# Patient Record
Sex: MALE | Race: WHITE | Employment: FULL TIME | URBAN - METROPOLITAN AREA
[De-identification: names, ages, dates, MRNs, and addresses within clinical notes are randomized per-mention and may not be internally consistent; named-entity substitution may affect disease eponyms.]

---

## 2023-10-24 ENCOUNTER — APPOINTMENT (OUTPATIENT)
Dept: RADIOLOGY | Facility: HOSPITAL | Age: 33
DRG: 309 | End: 2023-10-24
Payer: COMMERCIAL

## 2023-10-24 ENCOUNTER — APPOINTMENT (OUTPATIENT)
Dept: CARDIOLOGY | Facility: HOSPITAL | Age: 33
DRG: 309 | End: 2023-10-24
Payer: COMMERCIAL

## 2023-10-24 ENCOUNTER — HOSPITAL ENCOUNTER (INPATIENT)
Facility: HOSPITAL | Age: 33
LOS: 2 days | Discharge: HOME | DRG: 309 | End: 2023-10-26
Attending: STUDENT IN AN ORGANIZED HEALTH CARE EDUCATION/TRAINING PROGRAM | Admitting: STUDENT IN AN ORGANIZED HEALTH CARE EDUCATION/TRAINING PROGRAM
Payer: COMMERCIAL

## 2023-10-24 DIAGNOSIS — I31.39 PERICARDIAL EFFUSION (HHS-HCC): ICD-10-CM

## 2023-10-24 DIAGNOSIS — I24.89 DEMAND ISCHEMIA (MULTI): ICD-10-CM

## 2023-10-24 DIAGNOSIS — I48.91 ATRIAL FIBRILLATION WITH RAPID VENTRICULAR RESPONSE (MULTI): Primary | ICD-10-CM

## 2023-10-24 DIAGNOSIS — I48.91 ATRIAL FIBRILLATION WITH RVR (MULTI): ICD-10-CM

## 2023-10-24 LAB
ALBUMIN SERPL BCP-MCNC: 4.3 G/DL (ref 3.4–5)
ALP SERPL-CCNC: 60 U/L (ref 33–120)
ALT SERPL W P-5'-P-CCNC: 18 U/L (ref 10–52)
ANION GAP BLDV CALCULATED.4IONS-SCNC: 1 MMOL/L (ref 10–25)
ANION GAP SERPL CALC-SCNC: 12 MMOL/L (ref 10–20)
AST SERPL W P-5'-P-CCNC: 16 U/L (ref 9–39)
BASE EXCESS BLDV CALC-SCNC: 8.3 MMOL/L (ref -2–3)
BASOPHILS # BLD AUTO: 0.05 X10*3/UL (ref 0–0.1)
BASOPHILS NFR BLD AUTO: 0.7 %
BILIRUB SERPL-MCNC: 0.6 MG/DL (ref 0–1.2)
BODY TEMPERATURE: 37 DEGREES CELSIUS
BUN SERPL-MCNC: 10 MG/DL (ref 6–23)
CA-I BLDV-SCNC: 1.14 MMOL/L (ref 1.1–1.33)
CALCIUM SERPL-MCNC: 9.5 MG/DL (ref 8.6–10.3)
CARDIAC TROPONIN I PNL SERPL HS: 46 NG/L (ref 0–20)
CARDIAC TROPONIN I PNL SERPL HS: 55 NG/L (ref 0–20)
CARDIAC TROPONIN I PNL SERPL HS: 59 NG/L (ref 0–20)
CARDIAC TROPONIN I PNL SERPL HS: 84 NG/L (ref 0–20)
CHLORIDE BLDV-SCNC: 102 MMOL/L (ref 98–107)
CHLORIDE SERPL-SCNC: 99 MMOL/L (ref 98–107)
CHOLEST SERPL-MCNC: 121 MG/DL (ref 0–199)
CHOLESTEROL/HDL RATIO: 5.9
CO2 SERPL-SCNC: 28 MMOL/L (ref 21–32)
CREAT SERPL-MCNC: 0.87 MG/DL (ref 0.5–1.3)
EOSINOPHIL # BLD AUTO: 0.25 X10*3/UL (ref 0–0.7)
EOSINOPHIL NFR BLD AUTO: 3.3 %
ERYTHROCYTE [DISTWIDTH] IN BLOOD BY AUTOMATED COUNT: 12.1 % (ref 11.5–14.5)
FLUAV RNA RESP QL NAA+PROBE: NOT DETECTED
FLUBV RNA RESP QL NAA+PROBE: NOT DETECTED
GFR SERPL CREATININE-BSD FRML MDRD: >90 ML/MIN/1.73M*2
GLUCOSE BLDV-MCNC: 100 MG/DL (ref 74–99)
GLUCOSE SERPL-MCNC: 96 MG/DL (ref 74–99)
HCO3 BLDV-SCNC: 34 MMOL/L (ref 22–26)
HCT VFR BLD AUTO: 42.7 % (ref 41–52)
HCT VFR BLD EST: 41 % (ref 41–52)
HDLC SERPL-MCNC: 20.4 MG/DL
HGB BLD-MCNC: 14.3 G/DL (ref 13.5–17.5)
HGB BLDV-MCNC: 13.6 G/DL (ref 13.5–17.5)
IMM GRANULOCYTES # BLD AUTO: 0.03 X10*3/UL (ref 0–0.7)
IMM GRANULOCYTES NFR BLD AUTO: 0.4 % (ref 0–0.9)
INHALED O2 CONCENTRATION: 21 %
LACTATE BLDV-SCNC: 1.1 MMOL/L (ref 0.4–2)
LACTATE SERPL-SCNC: 0.9 MMOL/L (ref 0.4–2)
LDLC SERPL CALC-MCNC: 86 MG/DL
LYMPHOCYTES # BLD AUTO: 1.59 X10*3/UL (ref 1.2–4.8)
LYMPHOCYTES NFR BLD AUTO: 20.8 %
MAGNESIUM SERPL-MCNC: 2.07 MG/DL (ref 1.6–2.4)
MCH RBC QN AUTO: 28.9 PG (ref 26–34)
MCHC RBC AUTO-ENTMCNC: 33.5 G/DL (ref 32–36)
MCV RBC AUTO: 86 FL (ref 80–100)
MONOCYTES # BLD AUTO: 0.71 X10*3/UL (ref 0.1–1)
MONOCYTES NFR BLD AUTO: 9.3 %
NEUTROPHILS # BLD AUTO: 5.02 X10*3/UL (ref 1.2–7.7)
NEUTROPHILS NFR BLD AUTO: 65.5 %
NON HDL CHOLESTEROL: 101 MG/DL (ref 0–149)
NRBC BLD-RTO: 0 /100 WBCS (ref 0–0)
OXYHGB MFR BLDV: 31.9 % (ref 45–75)
PCO2 BLDV: 50 MM HG (ref 41–51)
PH BLDV: 7.44 PH (ref 7.33–7.43)
PHOSPHATE SERPL-MCNC: 3.4 MG/DL (ref 2.5–4.9)
PLATELET # BLD AUTO: 333 X10*3/UL (ref 150–450)
PMV BLD AUTO: 8.9 FL (ref 7.5–11.5)
PO2 BLDV: 28 MM HG (ref 35–45)
POTASSIUM BLDV-SCNC: 4.4 MMOL/L (ref 3.5–5.3)
POTASSIUM SERPL-SCNC: 4.2 MMOL/L (ref 3.5–5.3)
PROT SERPL-MCNC: 7.6 G/DL (ref 6.4–8.2)
RBC # BLD AUTO: 4.94 X10*6/UL (ref 4.5–5.9)
SAO2 % BLDV: 32 % (ref 45–75)
SARS-COV-2 RNA RESP QL NAA+PROBE: NOT DETECTED
SODIUM BLDV-SCNC: 133 MMOL/L (ref 136–145)
SODIUM SERPL-SCNC: 135 MMOL/L (ref 136–145)
TRIGL SERPL-MCNC: 74 MG/DL (ref 0–149)
TSH SERPL-ACNC: 1.51 MIU/L (ref 0.44–3.98)
VLDL: 15 MG/DL (ref 0–40)
WBC # BLD AUTO: 7.7 X10*3/UL (ref 4.4–11.3)

## 2023-10-24 PROCEDURE — 2500000001 HC RX 250 WO HCPCS SELF ADMINISTERED DRUGS (ALT 637 FOR MEDICARE OP): Performed by: STUDENT IN AN ORGANIZED HEALTH CARE EDUCATION/TRAINING PROGRAM

## 2023-10-24 PROCEDURE — 96361 HYDRATE IV INFUSION ADD-ON: CPT

## 2023-10-24 PROCEDURE — 85025 COMPLETE CBC W/AUTO DIFF WBC: CPT | Performed by: STUDENT IN AN ORGANIZED HEALTH CARE EDUCATION/TRAINING PROGRAM

## 2023-10-24 PROCEDURE — 2060000001 HC INTERMEDIATE ICU ROOM DAILY

## 2023-10-24 PROCEDURE — 2500000001 HC RX 250 WO HCPCS SELF ADMINISTERED DRUGS (ALT 637 FOR MEDICARE OP): Performed by: INTERNAL MEDICINE

## 2023-10-24 PROCEDURE — 71045 X-RAY EXAM CHEST 1 VIEW: CPT | Performed by: RADIOLOGY

## 2023-10-24 PROCEDURE — 83735 ASSAY OF MAGNESIUM: CPT | Performed by: STUDENT IN AN ORGANIZED HEALTH CARE EDUCATION/TRAINING PROGRAM

## 2023-10-24 PROCEDURE — 85018 HEMOGLOBIN: CPT | Performed by: STUDENT IN AN ORGANIZED HEALTH CARE EDUCATION/TRAINING PROGRAM

## 2023-10-24 PROCEDURE — 84443 ASSAY THYROID STIM HORMONE: CPT | Performed by: INTERNAL MEDICINE

## 2023-10-24 PROCEDURE — 84132 ASSAY OF SERUM POTASSIUM: CPT | Performed by: STUDENT IN AN ORGANIZED HEALTH CARE EDUCATION/TRAINING PROGRAM

## 2023-10-24 PROCEDURE — 36415 COLL VENOUS BLD VENIPUNCTURE: CPT | Performed by: STUDENT IN AN ORGANIZED HEALTH CARE EDUCATION/TRAINING PROGRAM

## 2023-10-24 PROCEDURE — 87636 SARSCOV2 & INF A&B AMP PRB: CPT | Performed by: STUDENT IN AN ORGANIZED HEALTH CARE EDUCATION/TRAINING PROGRAM

## 2023-10-24 PROCEDURE — 2500000005 HC RX 250 GENERAL PHARMACY W/O HCPCS: Performed by: STUDENT IN AN ORGANIZED HEALTH CARE EDUCATION/TRAINING PROGRAM

## 2023-10-24 PROCEDURE — 96374 THER/PROPH/DIAG INJ IV PUSH: CPT

## 2023-10-24 PROCEDURE — 99223 1ST HOSP IP/OBS HIGH 75: CPT | Performed by: INTERNAL MEDICINE

## 2023-10-24 PROCEDURE — 2500000004 HC RX 250 GENERAL PHARMACY W/ HCPCS (ALT 636 FOR OP/ED): Performed by: STUDENT IN AN ORGANIZED HEALTH CARE EDUCATION/TRAINING PROGRAM

## 2023-10-24 PROCEDURE — 84100 ASSAY OF PHOSPHORUS: CPT | Performed by: STUDENT IN AN ORGANIZED HEALTH CARE EDUCATION/TRAINING PROGRAM

## 2023-10-24 PROCEDURE — 71045 X-RAY EXAM CHEST 1 VIEW: CPT | Mod: FY

## 2023-10-24 PROCEDURE — 82435 ASSAY OF BLOOD CHLORIDE: CPT | Performed by: STUDENT IN AN ORGANIZED HEALTH CARE EDUCATION/TRAINING PROGRAM

## 2023-10-24 PROCEDURE — 84484 ASSAY OF TROPONIN QUANT: CPT | Performed by: STUDENT IN AN ORGANIZED HEALTH CARE EDUCATION/TRAINING PROGRAM

## 2023-10-24 PROCEDURE — 93005 ELECTROCARDIOGRAM TRACING: CPT

## 2023-10-24 PROCEDURE — 83605 ASSAY OF LACTIC ACID: CPT | Performed by: STUDENT IN AN ORGANIZED HEALTH CARE EDUCATION/TRAINING PROGRAM

## 2023-10-24 PROCEDURE — 80061 LIPID PANEL: CPT | Performed by: INTERNAL MEDICINE

## 2023-10-24 PROCEDURE — 84484 ASSAY OF TROPONIN QUANT: CPT | Performed by: INTERNAL MEDICINE

## 2023-10-24 PROCEDURE — 99291 CRITICAL CARE FIRST HOUR: CPT | Performed by: STUDENT IN AN ORGANIZED HEALTH CARE EDUCATION/TRAINING PROGRAM

## 2023-10-24 RX ORDER — ASPIRIN 81 MG/1
81 TABLET ORAL DAILY
Status: DISCONTINUED | OUTPATIENT
Start: 2023-10-25 | End: 2023-10-26

## 2023-10-24 RX ORDER — ONDANSETRON HYDROCHLORIDE 2 MG/ML
4 INJECTION, SOLUTION INTRAVENOUS EVERY 8 HOURS PRN
Status: DISCONTINUED | OUTPATIENT
Start: 2023-10-24 | End: 2023-10-26 | Stop reason: HOSPADM

## 2023-10-24 RX ORDER — ATORVASTATIN CALCIUM 40 MG/1
40 TABLET, FILM COATED ORAL NIGHTLY
Status: DISCONTINUED | OUTPATIENT
Start: 2023-10-24 | End: 2023-10-25

## 2023-10-24 RX ORDER — AMOXICILLIN 250 MG
2 CAPSULE ORAL 2 TIMES DAILY PRN
Status: DISCONTINUED | OUTPATIENT
Start: 2023-10-24 | End: 2023-10-26 | Stop reason: HOSPADM

## 2023-10-24 RX ORDER — NAPROXEN SODIUM 220 MG/1
324 TABLET, FILM COATED ORAL ONCE
Status: COMPLETED | OUTPATIENT
Start: 2023-10-24 | End: 2023-10-24

## 2023-10-24 RX ORDER — ACETAMINOPHEN 325 MG/1
650 TABLET ORAL EVERY 6 HOURS PRN
Status: DISCONTINUED | OUTPATIENT
Start: 2023-10-24 | End: 2023-10-26 | Stop reason: HOSPADM

## 2023-10-24 RX ORDER — DILTIAZEM HYDROCHLORIDE 5 MG/ML
20 INJECTION INTRAVENOUS ONCE
Status: COMPLETED | OUTPATIENT
Start: 2023-10-24 | End: 2023-10-24

## 2023-10-24 RX ORDER — TALC
3 POWDER (GRAM) TOPICAL NIGHTLY PRN
Status: DISCONTINUED | OUTPATIENT
Start: 2023-10-24 | End: 2023-10-26 | Stop reason: HOSPADM

## 2023-10-24 RX ADMIN — SODIUM CHLORIDE, POTASSIUM CHLORIDE, SODIUM LACTATE AND CALCIUM CHLORIDE 1000 ML: 600; 310; 30; 20 INJECTION, SOLUTION INTRAVENOUS at 09:46

## 2023-10-24 RX ADMIN — Medication 3 MG: at 21:16

## 2023-10-24 RX ADMIN — DILTIAZEM HYDROCHLORIDE 20 MG: 5 INJECTION, SOLUTION INTRAVENOUS at 12:12

## 2023-10-24 RX ADMIN — DILTIAZEM HYDROCHLORIDE 5 MG/HR: 5 INJECTION, SOLUTION INTRAVENOUS at 12:12

## 2023-10-24 RX ADMIN — ASPIRIN 81 MG CHEWABLE TABLET 324 MG: 81 TABLET CHEWABLE at 12:11

## 2023-10-24 RX ADMIN — ATORVASTATIN CALCIUM 40 MG: 40 TABLET, FILM COATED ORAL at 21:16

## 2023-10-24 SDOH — SOCIAL STABILITY: SOCIAL INSECURITY: ARE THERE ANY APPARENT SIGNS OF INJURIES/BEHAVIORS THAT COULD BE RELATED TO ABUSE/NEGLECT?: NO

## 2023-10-24 SDOH — SOCIAL STABILITY: SOCIAL INSECURITY: HAVE YOU HAD THOUGHTS OF HARMING ANYONE ELSE?: YES

## 2023-10-24 SDOH — SOCIAL STABILITY: SOCIAL INSECURITY: ARE YOU OR HAVE YOU BEEN THREATENED OR ABUSED PHYSICALLY, EMOTIONALLY, OR SEXUALLY BY ANYONE?: NO

## 2023-10-24 SDOH — SOCIAL STABILITY: SOCIAL INSECURITY: DOES ANYONE TRY TO KEEP YOU FROM HAVING/CONTACTING OTHER FRIENDS OR DOING THINGS OUTSIDE YOUR HOME?: NO

## 2023-10-24 SDOH — SOCIAL STABILITY: SOCIAL INSECURITY: HAS ANYONE EVER THREATENED TO HURT YOUR FAMILY OR YOUR PETS?: NO

## 2023-10-24 SDOH — SOCIAL STABILITY: SOCIAL INSECURITY: DO YOU FEEL UNSAFE GOING BACK TO THE PLACE WHERE YOU ARE LIVING?: NO

## 2023-10-24 SDOH — SOCIAL STABILITY: SOCIAL INSECURITY: DO YOU FEEL ANYONE HAS EXPLOITED OR TAKEN ADVANTAGE OF YOU FINANCIALLY OR OF YOUR PERSONAL PROPERTY?: NO

## 2023-10-24 SDOH — SOCIAL STABILITY: SOCIAL INSECURITY: ABUSE: ADULT

## 2023-10-24 ASSESSMENT — COGNITIVE AND FUNCTIONAL STATUS - GENERAL
MOBILITY SCORE: 24
DAILY ACTIVITIY SCORE: 24
PATIENT BASELINE BEDBOUND: NO
DAILY ACTIVITIY SCORE: 24
MOBILITY SCORE: 24

## 2023-10-24 ASSESSMENT — ACTIVITIES OF DAILY LIVING (ADL)
ADEQUATE_TO_COMPLETE_ADL: YES
LACK_OF_TRANSPORTATION: NO
DRESSING YOURSELF: INDEPENDENT
JUDGMENT_ADEQUATE_SAFELY_COMPLETE_DAILY_ACTIVITIES: YES
BATHING: INDEPENDENT
HEARING - LEFT EAR: FUNCTIONAL
PATIENT'S MEMORY ADEQUATE TO SAFELY COMPLETE DAILY ACTIVITIES?: YES
GROOMING: INDEPENDENT
FEEDING YOURSELF: INDEPENDENT
WALKS IN HOME: INDEPENDENT
TOILETING: INDEPENDENT
HEARING - RIGHT EAR: FUNCTIONAL

## 2023-10-24 ASSESSMENT — LIFESTYLE VARIABLES
HOW OFTEN DURING THE LAST YEAR HAVE YOU FAILED TO DO WHAT WAS NORMALLY EXPECTED FROM YOU BECAUSE OF DRINKING: NEVER
HOW OFTEN DURING THE LAST YEAR HAVE YOU HAD A FEELING OF GUILT OR REMORSE AFTER DRINKING: NEVER
AUDIT-C TOTAL SCORE: 4
AUDIT-C TOTAL SCORE: 4
HOW OFTEN DO YOU HAVE 6 OR MORE DRINKS ON ONE OCCASION: MONTHLY
HOW OFTEN DURING THE LAST YEAR HAVE YOU NEEDED AN ALCOHOLIC DRINK FIRST THING IN THE MORNING TO GET YOURSELF GOING AFTER A NIGHT OF HEAVY DRINKING: NEVER
EVER HAD A DRINK FIRST THING IN THE MORNING TO STEADY YOUR NERVES TO GET RID OF A HANGOVER: NO
HOW OFTEN DURING THE LAST YEAR HAVE YOU FOUND THAT YOU WERE NOT ABLE TO STOP DRINKING ONCE YOU HAD STARTED: NEVER
REASON UNABLE TO ASSESS: NO
HAVE PEOPLE ANNOYED YOU BY CRITICIZING YOUR DRINKING: NO
AUDIT TOTAL SCORE: 4
HOW OFTEN DURING THE LAST YEAR HAVE YOU BEEN UNABLE TO REMEMBER WHAT HAPPENED THE NIGHT BEFORE BECAUSE YOU HAD BEEN DRINKING: NEVER
HAS A RELATIVE, FRIEND, DOCTOR, OR ANOTHER HEALTH PROFESSIONAL EXPRESSED CONCERN ABOUT YOUR DRINKING OR SUGGESTED YOU CUT DOWN: NO
SKIP TO QUESTIONS 9-10: 0
HAVE YOU OR SOMEONE ELSE BEEN INJURED AS A RESULT OF YOUR DRINKING: NO
SUBSTANCE_ABUSE_PAST_12_MONTHS: NO
AUDIT TOTAL SCORE: 0
HAVE YOU EVER FELT YOU SHOULD CUT DOWN ON YOUR DRINKING: NO
PRESCIPTION_ABUSE_PAST_12_MONTHS: NO
HOW MANY STANDARD DRINKS CONTAINING ALCOHOL DO YOU HAVE ON A TYPICAL DAY: 1 OR 2
EVER FELT BAD OR GUILTY ABOUT YOUR DRINKING: NO
HOW OFTEN DO YOU HAVE A DRINK CONTAINING ALCOHOL: 2-4 TIMES A MONTH

## 2023-10-24 ASSESSMENT — PATIENT HEALTH QUESTIONNAIRE - PHQ9
SUM OF ALL RESPONSES TO PHQ9 QUESTIONS 1 & 2: 0
1. LITTLE INTEREST OR PLEASURE IN DOING THINGS: NOT AT ALL
2. FEELING DOWN, DEPRESSED OR HOPELESS: NOT AT ALL

## 2023-10-24 ASSESSMENT — COLUMBIA-SUICIDE SEVERITY RATING SCALE - C-SSRS
6. HAVE YOU EVER DONE ANYTHING, STARTED TO DO ANYTHING, OR PREPARED TO DO ANYTHING TO END YOUR LIFE?: NO
1. IN THE PAST MONTH, HAVE YOU WISHED YOU WERE DEAD OR WISHED YOU COULD GO TO SLEEP AND NOT WAKE UP?: NO
2. HAVE YOU ACTUALLY HAD ANY THOUGHTS OF KILLING YOURSELF?: NO

## 2023-10-24 ASSESSMENT — ENCOUNTER SYMPTOMS
DIARRHEA: 0
FEVER: 1
SHORTNESS OF BREATH: 0
VOMITING: 0
NUMBNESS: 0
FATIGUE: 1
WHEEZING: 0
DIFFICULTY URINATING: 0
CHEST TIGHTNESS: 1
COLOR CHANGE: 0
DIZZINESS: 0
COUGH: 0
PALPITATIONS: 1
NAUSEA: 0

## 2023-10-24 ASSESSMENT — PAIN - FUNCTIONAL ASSESSMENT
PAIN_FUNCTIONAL_ASSESSMENT: 0-10
PAIN_FUNCTIONAL_ASSESSMENT: 0-10

## 2023-10-24 ASSESSMENT — PAIN SCALES - GENERAL
PAINLEVEL_OUTOF10: 0 - NO PAIN

## 2023-10-24 NOTE — H&P
History Of Present Illness  Jonah Britton is a 33 y.o. male with no significant past medical history presenting with chest pain and palpitations.  Symptoms started about Thursday about 5 days prior to presentation with generally feeling unwell chest pain on deep breathing.  There was associated fever which ranged from 100 to 101 °F.  There was associated diaphoresis.  He is unsure if she had any palpitations.  On Thursday he went to [facility] where he was seen in the ED with EKGs and blood work done with no significant findings.  He denies any nausea, vomiting or abdominal pain.  He felt better by Monday 1 day prior to presentation however symptoms recurred overnight which led to his presentation to the emergency room for further management.    On admission in the emergency room he was hemodynamically stable with a blood pressure of 154/63 mmHg but was tachycardic and in A-fib with a heart rate of 151/min, respirate rate of 18/min, temperature of 36.3 °C and was saturating 99 to 100% on room air.  EKG showed A-fib RVR with a rate of up to 179/min.  Initial CBC and BMP were unremarkable. High-sensitivity troponin was elevated at 84.  Chest x-ray was done which showed mild cardiomegaly without any other radiographic evidence of CHF or pneumonia.  He has been admitted for further evaluation and management          Past Medical History  He has no past medical history on file.    Surgical History  He has no past surgical history on file.     Social History  He reports that he quit smoking about 4 years ago. His smoking use included cigarettes. He has never used smokeless tobacco. No history on file for alcohol use and drug use.    Family History  Atrial fibrillation and RVR on paternal grandmother  Paroxysmal atrial tachycardia in mother       Allergies  Patient has no known allergies.    Review of Systems   Constitutional:  Positive for fatigue and fever.   HENT:  Negative for congestion.    Respiratory:   Positive for chest tightness. Negative for cough, shortness of breath and wheezing.    Cardiovascular:  Positive for chest pain and palpitations.   Gastrointestinal:  Negative for diarrhea, nausea and vomiting.   Genitourinary:  Negative for difficulty urinating.   Skin:  Negative for color change.   Neurological:  Negative for dizziness and numbness.        Physical Exam  Constitutional:       Appearance: Normal appearance.   HENT:      Head: Normocephalic and atraumatic.      Mouth/Throat:      Mouth: Mucous membranes are moist.   Eyes:      Pupils: Pupils are equal, round, and reactive to light.   Cardiovascular:      Rate and Rhythm: Tachycardia present. Rhythm irregular.      Pulses: Normal pulses.   Pulmonary:      Effort: Pulmonary effort is normal. No respiratory distress.      Breath sounds: Normal breath sounds.   Abdominal:      General: Abdomen is flat. Bowel sounds are normal.      Palpations: Abdomen is soft.   Neurological:      General: No focal deficit present.      Mental Status: He is alert and oriented to person, place, and time.   Psychiatric:         Mood and Affect: Mood normal.         Behavior: Behavior normal.         Thought Content: Thought content normal.          Last Recorded Vitals  /84   Pulse 105   Temp 36.3 °C (97.4 °F) (Temporal)   Resp 22   Wt 107 kg (235 lb)   SpO2 98%     Relevant Results  Results for orders placed or performed during the hospital encounter of 10/24/23 (from the past 24 hour(s))   CBC and Auto Differential   Result Value Ref Range    WBC 7.7 4.4 - 11.3 x10*3/uL    nRBC 0.0 0.0 - 0.0 /100 WBCs    RBC 4.94 4.50 - 5.90 x10*6/uL    Hemoglobin 14.3 13.5 - 17.5 g/dL    Hematocrit 42.7 41.0 - 52.0 %    MCV 86 80 - 100 fL    MCH 28.9 26.0 - 34.0 pg    MCHC 33.5 32.0 - 36.0 g/dL    RDW 12.1 11.5 - 14.5 %    Platelets 333 150 - 450 x10*3/uL    MPV 8.9 7.5 - 11.5 fL    Neutrophils % 65.5 40.0 - 80.0 %    Immature Granulocytes %, Automated 0.4 0.0 - 0.9 %     Lymphocytes % 20.8 13.0 - 44.0 %    Monocytes % 9.3 2.0 - 10.0 %    Eosinophils % 3.3 0.0 - 6.0 %    Basophils % 0.7 0.0 - 2.0 %    Neutrophils Absolute 5.02 1.20 - 7.70 x10*3/uL    Immature Granulocytes Absolute, Automated 0.03 0.00 - 0.70 x10*3/uL    Lymphocytes Absolute 1.59 1.20 - 4.80 x10*3/uL    Monocytes Absolute 0.71 0.10 - 1.00 x10*3/uL    Eosinophils Absolute 0.25 0.00 - 0.70 x10*3/uL    Basophils Absolute 0.05 0.00 - 0.10 x10*3/uL   Comprehensive metabolic panel   Result Value Ref Range    Glucose 96 74 - 99 mg/dL    Sodium 135 (L) 136 - 145 mmol/L    Potassium 4.2 3.5 - 5.3 mmol/L    Chloride 99 98 - 107 mmol/L    Bicarbonate 28 21 - 32 mmol/L    Anion Gap 12 10 - 20 mmol/L    Urea Nitrogen 10 6 - 23 mg/dL    Creatinine 0.87 0.50 - 1.30 mg/dL    eGFR >90 >60 mL/min/1.73m*2    Calcium 9.5 8.6 - 10.3 mg/dL    Albumin 4.3 3.4 - 5.0 g/dL    Alkaline Phosphatase 60 33 - 120 U/L    Total Protein 7.6 6.4 - 8.2 g/dL    AST 16 9 - 39 U/L    Bilirubin, Total 0.6 0.0 - 1.2 mg/dL    ALT 18 10 - 52 U/L   Magnesium   Result Value Ref Range    Magnesium 2.07 1.60 - 2.40 mg/dL   Phosphorus   Result Value Ref Range    Phosphorus 3.4 2.5 - 4.9 mg/dL   Lactate   Result Value Ref Range    Lactate 0.9 0.4 - 2.0 mmol/L   Blood Gas Venous Full Panel   Result Value Ref Range    POCT pH, Venous 7.44 (H) 7.33 - 7.43 pH    POCT pCO2, Venous 50 41 - 51 mm Hg    POCT pO2, Venous 28 (L) 35 - 45 mm Hg    POCT SO2, Venous 32 (L) 45 - 75 %    POCT Oxy Hemoglobin, Venous 31.9 (L) 45.0 - 75.0 %    POCT Hematocrit Calculated, Venous 41.0 41.0 - 52.0 %    POCT Sodium, Venous 133 (L) 136 - 145 mmol/L    POCT Potassium, Venous 4.4 3.5 - 5.3 mmol/L    POCT Chloride, Venous 102 98 - 107 mmol/L    POCT Ionized Calicum, Venous 1.14 1.10 - 1.33 mmol/L    POCT Glucose, Venous 100 (H) 74 - 99 mg/dL    POCT Lactate, Venous 1.1 0.4 - 2.0 mmol/L    POCT Base Excess, Venous 8.3 (H) -2.0 - 3.0 mmol/L    POCT HCO3 Calculated, Venous 34.0 (H) 22.0 - 26.0  mmol/L    POCT Hemoglobin, Venous 13.6 13.5 - 17.5 g/dL    POCT Anion Gap, Venous 1.0 (L) 10.0 - 25.0 mmol/L    Patient Temperature 37.0 degrees Celsius    FiO2 21 %   Troponin I, High Sensitivity   Result Value Ref Range    Troponin I, High Sensitivity 84 (HH) 0 - 20 ng/L   SARS-CoV-2 RT PCR   Result Value Ref Range    Coronavirus 2019, PCR Not Detected Not Detected   Influenza A, and B PCR   Result Value Ref Range    Flu A Result Not Detected Not Detected    Flu B Result Not Detected Not Detected   Troponin I, High Sensitivity   Result Value Ref Range    Troponin I, High Sensitivity 59 (HH) 0 - 20 ng/L               Assessment/Plan   Principal Problem:    Atrial fibrillation with RVR (CMS/HCC)      Atrial fibrillation with RVR  Admitted under telemetry  Slightly elevated high-sensitivity troponin so I will cycle more  IV Cardizem drip  [id number] score is at least 1 so I will hold off on anticoagulation and start aspirin  Echocardiogram in a.m.  TSH in a.m.  Lipid panel and hemoglobin A1c in a.m.  Cardiology is consulted    Chest pain  Likely associated with A-fib RVR above  Start p.o. aspirin and statin  Echocardiogram in a.m.  Cardiology is consulted      Hypertension.    Not controlled with IV Cardizem drip  Continue to monitor daily      DVT prophylaxis  Subcutaneous Lovenox      Brittany Hernandez MD

## 2023-10-24 NOTE — ED PROVIDER NOTES
"HPI   Chief Complaint   Patient presents with    \"feels like i have irregular heartbeat\"/sob       HPI: 33-year-old male, otherwise healthy, is presenting to the emergency department today for an abnormal heart rate.  Patient reports that he has been feeling flulike symptoms all of last week.  Was seen at [facility] and diagnosed with a likely viral infection.  Reports that he started to feel better over the weekend but was getting intermittent heart palpitations and intermittent shortness of breath that resolved spontaneously.  Patient reports that today while at work he became diaphoretic, really short of breath, and felt like his heart was racing.  His friend at work checked his pulse and noted it to be fast and irregular.  They called 911 and the patient was brought in for further evaluation.  Patient states he has palpitations and shortness of breath at this time but denies any lightheadedness, dizziness, near-syncope, or pain.  He reports he has been taking ibuprofen, NyQuil, and DayQuil for his flulike symptoms.  Denies any diarrhea, no vomiting, his appetite has been a little on the lower end but denies any significant decrease.  He reports over the past several years he has had intermittent palpitations but never to this severity and never lasting this long    ROS: Complete 12 point review of systems performed, otherwise negative except as noted in the history of present illness    PMH: Reviewed, documented below in note. Pertinents in HPI  PSH: Reviewed and documented below in note. Pertinents in HPI  SH: Occasional alcohol, no illicits.  Not homeless.  Fam: Reviewed, noncontributory to patients current complaint  MEDS: Reviewed and documented below in note. Pertinents in HPI  ALLERGIES: Reviewed and documented below in note.        History provided by:  Patient   used: No                        [place] Coma Scale Score: 15                  Patient History   History reviewed. No " January 13, 2022    Hello, may I speak with Avelino Tarango?    My name is Jen Moulton       I am  with [facility]  [facility]  [address] [place] [address] [phone].    Before we get started may I verify your date of birth? 1964    I am calling to officially welcome you to our practice and ask about your recent visit. Is this a good time to talk? yes    Tell me about your visit with us. What things went well?  everything      We're always looking for ways to make our patients' experiences even better. Do you have recommendations on ways we may improve?  no    Overall were you satisfied with your first visit to our practice? yes       I appreciate you taking the time to speak with me today. Is there anything else I can do for you? no      Thank you, and have a great day.       pertinent past medical history.  History reviewed. No pertinent surgical history.  No family history on file.  Social History     Tobacco Use    Smoking status: Former     Types: Cigarettes     Quit date: 10/1/2019     Years since quittin.0    Smokeless tobacco: Never   Substance Use Topics    Alcohol use: Not on file    Drug use: Not on file       Physical Exam   ED Triage Vitals [10/24/23 0917]   Temp Heart Rate Resp BP   36.3 °C (97.4 °F) 85 18 154/63      SpO2 Temp Source Heart Rate Source Patient Position   99 % Temporal Monitor --      BP Location FiO2 (%)     -- --       Physical Exam  Vitals and nursing note reviewed.   Constitutional:       Appearance: Normal appearance.   HENT:      Head: Normocephalic and atraumatic.      Mouth/Throat:      Mouth: Mucous membranes are moist.   Neck:      Vascular: No carotid bruit.   Cardiovascular:      Rate and Rhythm: Tachycardia present. Rhythm irregular.      Pulses: Normal pulses.      Heart sounds: Normal heart sounds.   Pulmonary:      Effort: Pulmonary effort is normal.      Breath sounds: Normal breath sounds.   Abdominal:      General: There is no distension.      Palpations: Abdomen is soft.      Tenderness: There is no abdominal tenderness. There is no guarding or rebound.   Musculoskeletal:         General: No tenderness, deformity or signs of injury.      Cervical back: Normal range of motion. No rigidity.   Skin:     General: Skin is warm and dry.      Capillary Refill: Capillary refill takes less than 2 seconds.   Neurological:      General: No focal deficit present.      Mental Status: He is alert and oriented to person, place, and time.      Sensory: No sensory deficit.      Motor: No weakness.   Psychiatric:         Mood and Affect: Mood normal.         Behavior: Behavior normal.         ED Course & MDM   ED Course as of 10/24/23 1223   Tue Oct 24, 2023   1033 Blood Gas Venous Full Panel(!)  No acidosis, no lactate [NS]   1154 Troponin I, High  Sensitivity(!!) [NS]   1155 Troponin I, High Sensitivity(!!): 84  Elevated trop noted. Will initiate aspirin and get a repeat [NS]      ED Course User Index  [NS] Bobby Chung MD         Diagnoses as of 10/24/23 1223   Atrial fibrillation with rapid ventricular response (CMS/HCC)   Demand ischemia       Medical Decision Making  All mentioned lab results, ECGs, and imaging were independently reviewed by myself  - Patient evaluated. Patient is presenting to the emergency department today for an irregular heartbeat.  EKG was obtained on the patient which demonstrates atrial fibrillation with rapid ventricular response, rate of 179, normal axis, no evidence of an acute STEMI.  Differential for the patient's A-fib includes but is not limited to dehydration, electrolyte derangements, anemia, potential viral illness, ACS or myocarditis also considered.  IV fluids were initiated and basic labs were obtained.  Labs do not demonstrate any significant electrolyte derangements, electrolytes are within normal limits, kidney and liver function normal, lactate is normal, no evidence of infection or anemia.  The patient's initial troponin is elevated 84 which could be related to demand ischemia versus myocarditis.  Concern for NSTEMI is low given no active chest pain and no findings on EKG.  Patient's heart rate did not respond to fluids and so was given diltiazem and IV diltiazem drip was ordered.  I did speak with Dr. Wright of cardiology regarding the patient we will admit the patient to medicine, stepdown, for continued management    - Monitored for any changes in stability or symptomatology. Patient remained stable.   - Counseled regarding labs, imaging, diagnosis, and plan. Patient was agreeable. All questions were answered. The patient was receptive and agreeable to the plan of care.   -The patient was instructed to return to the emergency department if any symptoms recurred, worsened, or if there were any  additional concerns.    *Disclaimer: This note was dictated by speech recognition. Minor errors in transcription may be present. Please call with questions.    Hemanth Chung MD          Procedure  Procedures     Bobby Chung MD  10/24/23 8350       Bobby Chung MD  10/24/23 1224

## 2023-10-24 NOTE — ED PROCEDURE NOTE
Procedure  Critical Care    Performed by: Bobby Chung MD  Authorized by: Bobby Chung MD    Critical care provider statement:     Critical care time (minutes):  34    Critical care was necessary to treat or prevent imminent or life-threatening deterioration of the following conditions: arrythmia.    Critical care was time spent personally by me on the following activities:  Discussions with consultants, development of treatment plan with patient or surrogate, evaluation of patient's response to treatment, examination of patient, obtaining history from patient or surrogate, ordering and performing treatments and interventions, ordering and review of laboratory studies and re-evaluation of patient's condition    Care discussed with comment:  Dr. Adriana Chung MD  10/24/23 6391

## 2023-10-25 ENCOUNTER — APPOINTMENT (OUTPATIENT)
Dept: CARDIOLOGY | Facility: HOSPITAL | Age: 33
DRG: 309 | End: 2023-10-25
Payer: COMMERCIAL

## 2023-10-25 LAB
ALBUMIN SERPL BCP-MCNC: 4 G/DL (ref 3.4–5)
ALP SERPL-CCNC: 56 U/L (ref 33–120)
ALT SERPL W P-5'-P-CCNC: 16 U/L (ref 10–52)
ANION GAP SERPL CALC-SCNC: 12 MMOL/L (ref 10–20)
AST SERPL W P-5'-P-CCNC: 15 U/L (ref 9–39)
BILIRUB SERPL-MCNC: 0.6 MG/DL (ref 0–1.2)
BUN SERPL-MCNC: 10 MG/DL (ref 6–23)
CALCIUM SERPL-MCNC: 9 MG/DL (ref 8.6–10.3)
CHLORIDE SERPL-SCNC: 100 MMOL/L (ref 98–107)
CO2 SERPL-SCNC: 29 MMOL/L (ref 21–32)
CREAT SERPL-MCNC: 0.91 MG/DL (ref 0.5–1.3)
CRP SERPL-MCNC: 7.76 MG/DL
EJECTION FRACTION APICAL 4 CHAMBER: 49.1
ERYTHROCYTE [DISTWIDTH] IN BLOOD BY AUTOMATED COUNT: 12.3 % (ref 11.5–14.5)
GFR SERPL CREATININE-BSD FRML MDRD: >90 ML/MIN/1.73M*2
GLUCOSE SERPL-MCNC: 102 MG/DL (ref 74–99)
HCT VFR BLD AUTO: 39.5 % (ref 41–52)
HGB BLD-MCNC: 12.8 G/DL (ref 13.5–17.5)
MCH RBC QN AUTO: 27.9 PG (ref 26–34)
MCHC RBC AUTO-ENTMCNC: 32.4 G/DL (ref 32–36)
MCV RBC AUTO: 86 FL (ref 80–100)
NRBC BLD-RTO: 0 /100 WBCS (ref 0–0)
PLATELET # BLD AUTO: 306 X10*3/UL (ref 150–450)
PMV BLD AUTO: 9.4 FL (ref 7.5–11.5)
POTASSIUM SERPL-SCNC: 4.2 MMOL/L (ref 3.5–5.3)
PROT SERPL-MCNC: 7 G/DL (ref 6.4–8.2)
RBC # BLD AUTO: 4.59 X10*6/UL (ref 4.5–5.9)
SODIUM SERPL-SCNC: 137 MMOL/L (ref 136–145)
WBC # BLD AUTO: 7.7 X10*3/UL (ref 4.4–11.3)

## 2023-10-25 PROCEDURE — 85027 COMPLETE CBC AUTOMATED: CPT | Performed by: INTERNAL MEDICINE

## 2023-10-25 PROCEDURE — 2500000004 HC RX 250 GENERAL PHARMACY W/ HCPCS (ALT 636 FOR OP/ED): Performed by: INTERNAL MEDICINE

## 2023-10-25 PROCEDURE — 93306 TTE W/DOPPLER COMPLETE: CPT

## 2023-10-25 PROCEDURE — 80053 COMPREHEN METABOLIC PANEL: CPT | Performed by: INTERNAL MEDICINE

## 2023-10-25 PROCEDURE — 99233 SBSQ HOSP IP/OBS HIGH 50: CPT | Performed by: INTERNAL MEDICINE

## 2023-10-25 PROCEDURE — 99223 1ST HOSP IP/OBS HIGH 75: CPT | Performed by: INTERNAL MEDICINE

## 2023-10-25 PROCEDURE — 2500000005 HC RX 250 GENERAL PHARMACY W/O HCPCS: Performed by: INTERNAL MEDICINE

## 2023-10-25 PROCEDURE — 2500000001 HC RX 250 WO HCPCS SELF ADMINISTERED DRUGS (ALT 637 FOR MEDICARE OP): Performed by: INTERNAL MEDICINE

## 2023-10-25 PROCEDURE — 2060000001 HC INTERMEDIATE ICU ROOM DAILY

## 2023-10-25 PROCEDURE — 86140 C-REACTIVE PROTEIN: CPT | Performed by: INTERNAL MEDICINE

## 2023-10-25 PROCEDURE — 93306 TTE W/DOPPLER COMPLETE: CPT | Performed by: INTERNAL MEDICINE

## 2023-10-25 PROCEDURE — 36415 COLL VENOUS BLD VENIPUNCTURE: CPT | Performed by: INTERNAL MEDICINE

## 2023-10-25 RX ORDER — COLCHICINE 0.6 MG/1
0.6 TABLET ORAL 2 TIMES DAILY
Status: DISCONTINUED | OUTPATIENT
Start: 2023-10-25 | End: 2023-10-26 | Stop reason: HOSPADM

## 2023-10-25 RX ORDER — METOPROLOL TARTRATE 25 MG/1
25 TABLET, FILM COATED ORAL 4 TIMES DAILY
Status: DISCONTINUED | OUTPATIENT
Start: 2023-10-25 | End: 2023-10-26

## 2023-10-25 RX ORDER — PANTOPRAZOLE SODIUM 40 MG/10ML
40 INJECTION, POWDER, LYOPHILIZED, FOR SOLUTION INTRAVENOUS
Status: DISCONTINUED | OUTPATIENT
Start: 2023-10-25 | End: 2023-10-26

## 2023-10-25 RX ORDER — PANTOPRAZOLE SODIUM 40 MG/1
40 TABLET, DELAYED RELEASE ORAL
Status: DISCONTINUED | OUTPATIENT
Start: 2023-10-25 | End: 2023-10-26 | Stop reason: HOSPADM

## 2023-10-25 RX ADMIN — PANTOPRAZOLE SODIUM 40 MG: 40 TABLET, DELAYED RELEASE ORAL at 18:12

## 2023-10-25 RX ADMIN — DILTIAZEM HYDROCHLORIDE 5 MG/HR: 5 INJECTION, SOLUTION INTRAVENOUS at 05:35

## 2023-10-25 RX ADMIN — ASPIRIN 81 MG: 81 TABLET, COATED ORAL at 09:23

## 2023-10-25 RX ADMIN — METOPROLOL TARTRATE 25 MG: 25 TABLET, FILM COATED ORAL at 20:18

## 2023-10-25 RX ADMIN — METOPROLOL TARTRATE 25 MG: 25 TABLET, FILM COATED ORAL at 18:12

## 2023-10-25 RX ADMIN — IBUPROFEN 600 MG: 200 TABLET, FILM COATED ORAL at 18:12

## 2023-10-25 RX ADMIN — IBUPROFEN 600 MG: 200 TABLET, FILM COATED ORAL at 21:43

## 2023-10-25 RX ADMIN — DILTIAZEM HYDROCHLORIDE 10 MG/HR: 5 INJECTION, SOLUTION INTRAVENOUS at 16:00

## 2023-10-25 RX ADMIN — Medication 3 MG: at 20:19

## 2023-10-25 RX ADMIN — COLCHICINE 0.6 MG: 0.6 TABLET ORAL at 20:20

## 2023-10-25 ASSESSMENT — COGNITIVE AND FUNCTIONAL STATUS - GENERAL
DAILY ACTIVITIY SCORE: 24
MOBILITY SCORE: 24

## 2023-10-25 ASSESSMENT — PAIN SCALES - GENERAL
PAINLEVEL_OUTOF10: 0 - NO PAIN

## 2023-10-25 NOTE — NURSING NOTE
With rounds, patient resting in bed without distress.  HR sporadic, jumping frtom  -130 without increase in activity.  Patient denies CP, diaphoresis, lightheadedness or dizziness.  Patient vital signs able to sustain increase in cardizem from 5mg to 10 mg.  Patient updated on plan of care and rationale of increasing dose.  Patient instructed to inform nurse of any symptoms of hypotension or bradycardia.  Patient verbalizes understanding.

## 2023-10-25 NOTE — CONSULTS
"Inpatient consult to Cardiology  Consult performed by: Luana Bergeron MD  Consult ordered by: Brittany Hernandez MD  Reason for consult: afib        History Of Present Illness:    Jonah Britton is a 33 y.o. male presenting with palpitations.  No prior h/o Afib/arrhythmia.    The patient reports having a \"cold\" for about 2-3 weeks prior.  Thursday of last week he was feeling generally unwell, with chest pain on deep breathing.  He also had associated fever with T 100-101 deg F.  On Thursday he went to [facility] where he was seen in the ED - reportedly had two EKG's which did not demonstrate abnormal heart rhythm.  He started to take ibuprofen, and by Monday he felt better.  Yesterday however he noticed recurrence of above symptoms as well as palpitations, prompting him to seek medical attention.  He denies any overt chest pain to me at time of history.  No current dizziness/LH, presyncope, LE edema, orthopnea, or PND.    In the ED BP was elevated at 154/63.  Patient was found to have Afib  bpm.  Labs significant for HSTI 84 > 59 > 46 > 55.  CXR demonstrated mild cardiomegaly without any acute CHF or PNA.      ROS:  The remainder of the review of systems was obtained, as was negative as pertains to the chief complaint.      Last Recorded Vitals:  Vitals:    10/25/23 0536 10/25/23 0859 10/25/23 1145 10/25/23 1608   BP: 121/81 122/81 131/81 117/70   BP Location:  Left arm Left arm Left arm   Patient Position:  Lying Lying Lying   Pulse: 79 70 77 75   Resp: 14 16 16 18   Temp:  36.1 °C (97 °F) 36.4 °C (97.6 °F) 36.7 °C (98.1 °F)   TempSrc:  Temporal Temporal Temporal   SpO2: 98% 97% 96% 98%   Weight:       Height:           Last Labs:  CBC - 10/25/2023:  5:32 AM  7.7 12.8 306    39.5      CMP - 10/25/2023:  5:32 AM  9.0 7.0 15 --- 0.6   3.4 4.0 16 56      PTT - No results in last year.  _   _ _     Troponin I, High Sensitivity   Date/Time Value Ref Range Status   10/24/2023 08:04 PM 55 (HH) 0 - 20 " "ng/L Final     Comment:     Previous result verified on 10/24/2023 1138 on specimen/case [id number] called with component TRPHS for procedure Troponin I, High Sensitivity with value 84 ng/L.   10/24/2023 04:22 PM 46 (H) 0 - 20 ng/L Final   10/24/2023 12:06 PM 59 (HH) 0 - 20 ng/L Final     Comment:     Previous result verified on 10/24/2023 1138 on specimen/case [id number] called with component TRPHS for procedure Troponin I, High Sensitivity with value 84 ng/L.     LDL Calculated   Date/Time Value Ref Range Status   10/24/2023 04:22 PM 86 <=99 mg/dL Final     Comment:                                 Near   Borderline      AGE      Desirable  Optimal    High     High     Very High     0-19 Y     0 - 109     ---    110-129   >/= 130     ----    20-24 Y     0 - 119     ---    120-159   >/= 160     ----      >24 Y     0 -  99   100-129  130-159   160-189     >/=190       VLDL   Date/Time Value Ref Range Status   10/24/2023 04:22 PM 15 0 - 40 mg/dL Final      Last I/O:  I/O last 3 completed shifts:  In: 898 (8.4 mL/kg) [P.O.:800; I.V.:98 (0.9 mL/kg)]  Out: - (0 mL/kg)   Weight: 106.6 kg     Past Cardiology Tests (Last 3 Years):  EKG:  No results found for this or any previous visit from the past 1095 days.    Echo:  Transthoracic Echo (TTE) Complete 10/25/2023    Ejection Fractions:  No results found for: \"EF\"  Cath:  No results found for this or any previous visit from the past 1095 days.    Stress Test:  No results found for this or any previous visit from the past 1095 days.    Cardiac Imaging:  No results found for this or any previous visit from the past 1095 days.      Past Medical History:  He has no past medical history on file.    Past Surgical History:  He has no past surgical history on file.      Social History:  He reports that he quit smoking about 4 years ago. His smoking use included cigarettes. He has never used smokeless tobacco. No history on file for alcohol use and drug use.    Family " History:  Reviewed and NC     Allergies:  Patient has no known allergies.    Inpatient Medications:  Scheduled medications   Medication Dose Route Frequency    aspirin  81 mg oral Daily    atorvastatin  40 mg oral Nightly    colchicine (gout)  0.6 mg oral BID    ibuprofen  600 mg oral TID    metoprolol tartrate  25 mg oral 4x daily    pantoprazole  40 mg oral Daily before breakfast    Or    pantoprazole  40 mg intravenous Daily before breakfast    perflutren lipid microspheres  0.5-10 mL of dilution intravenous Once in imaging    perflutren protein A microsphere  0.5 mL intravenous Once in imaging    sulfur hexafluoride microsphr  2 mL intravenous Once in imaging     PRN medications   Medication    acetaminophen    melatonin    ondansetron    sennosides-docusate sodium     Continuous Medications   Medication Dose Last Rate     Outpatient Medications:  No current outpatient medications    Physical Exam:  Physical Exam  HENT:      Head: Normocephalic.      Mouth/Throat:      Mouth: Mucous membranes are moist.   Eyes:      Extraocular Movements: Extraocular movements intact.   Cardiovascular:      Rate and Rhythm: Normal rate and regular rhythm.      Heart sounds:      Friction rub present.   Pulmonary:      Effort: Pulmonary effort is normal.      Breath sounds: Normal breath sounds.   Abdominal:      Palpations: Abdomen is soft.   Musculoskeletal:      Cervical back: Neck supple.   Skin:     General: Skin is warm.      Comments: Mild diaphoresis   Neurological:      General: No focal deficit present.      Mental Status: He is alert and oriented to person, place, and time.   Psychiatric:         Mood and Affect: Mood normal.            Assessment/Plan   Pericardial effusion:  new pericardial effusion in setting what sounds like recent viral syndrome.  TSH WNL.  No prior h/o connective tissue d/o, malignancy, exposure to TB.  Currently hemodynamically stable.    TTE reviewed - mod-large pericardial effusion without  echo signs of tamponade.  Rec:  initial conservative management for pericarditis with associated pericardial effusion with anti-inflammatory medications, IVF, avoidance of dehydration, etc.  If he fails conservative management, then may benefit from diagnostic/therapeutic pericardiocentesis.  -check CRP  -start ibuprofen at anti-inflammatory dosing, 600mg PO three times daily  -start pantoprazole for gastric protection with above  -start colchicine 0.6mg bid  -stop atorvastatin d/t interaction with colchicine  -plan repeat TTE in 5-7 days to reevaluate size of effusion and reexamine for any hemodynamic effect of effusion  -avoid diuretics, dehydration  -if fails conservative therapy, may benefit from diagnostic/therapeutic pericardiocentesis    Atrial fibrillation:  new presentation of atrial fibrillation with RVR.  I personally reviewed the EKG on presentation, which demontrated Afib 's.  Suspect Afib in setting of above viral illness and pericarditis/pericardial effusion.  At this point, I would advocate rate control.  If his pericarditis / effusion improve but he remains in Afib, may recommend for outpatient [facility].    -tele  -initial rate control strategy due to above  -due to interaction with colchicine, would wean off diltiazem  -metoprolol tartrate 25mg PO q6hrs for rate control  -with pericardial effusion, will avoid chronic anticoagulation - also [id number] is 0, placing him at low risk for CVA    Elevated troponin:  minimally elevated troponin without chest pain syndrome.  Suspect 2/2 pericarditis.  -monitor for chest pain    Peripheral IV 10/24/23 20 G Right Antecubital (Active)   Site Assessment Clean;Dry;Intact 10/25/23 0800   Dressing Type Transparent 10/25/23 0800   Line Status Infusing 10/25/23 0800   Dressing Status Dry;Clean 10/25/23 0800   Number of days: 1       Code Status:  Full Code      Luana Bergeron MD

## 2023-10-25 NOTE — NURSING NOTE
Patient states that he does not feel his heart racing when he is up to the bathroom and has a hr in the 150-160's.  Patient denies dizziness, palpitations and/or CP.  Labs drawn and sent.  Vitals as charted.  Patient denies needs.

## 2023-10-25 NOTE — NURSING NOTE
HR decreasing into 80's and dropping into 70's frequently with cardizen gtt at 10.  Decreased gtt back to 5

## 2023-10-25 NOTE — NURSING NOTE
Attempted several times to communicate to Mary and Rudy regarding pt AFIB condition and treatment plan. I was advised Dr. Grant will not cover pt, pt advised Dr. Bergeron spoke with pt and she will see if a cardioversion will happen today. Pt remains NPO at this time. Waiting on further information.

## 2023-10-25 NOTE — CARE PLAN
Problem: Pain - Adult  Goal: Verbalizes/displays adequate comfort level or baseline comfort level  Outcome: Not Progressing     Problem: Safety - Adult  Goal: Free from fall injury  Outcome: Not Progressing     Problem: Discharge Planning  Goal: Discharge to home or other facility with appropriate resources  Outcome: Not Progressing     Problem: Chronic Conditions and Co-morbidities  Goal: Patient's chronic conditions and co-morbidity symptoms are monitored and maintained or improved  Outcome: Not Progressing     Problem: Arrythmia/Dysrhythmia  Goal: Lab values return to normal range  Outcome: Not Progressing  Goal: No evidence of post procedure complications  Outcome: Not Progressing  Goal: Promote self management  Outcome: Not Progressing  Goal: Serial ECG will return to baseline  Outcome: Not Progressing  Goal: Verbalize understanding of procedures/devices  Outcome: Not Progressing  Goal: Vital signs return to baseline  Outcome: Not Progressing  Goal: Care and maintenance of device (specify)  Outcome: Not Progressing   The patient's goals for the shift include not have any palpitations or SOB    The clinical goals for the shift include no palpiations    Over the shift, the patient did not make progress toward the following goals. Barriers to progression include . Recommendations to address these barriers include .    Problem: Pain - Adult  Goal: Verbalizes/displays adequate comfort level or baseline comfort level  Outcome: Not Progressing     Problem: Safety - Adult  Goal: Free from fall injury  Outcome: Not Progressing     Problem: Discharge Planning  Goal: Discharge to home or other facility with appropriate resources  Outcome: Not Progressing     Problem: Chronic Conditions and Co-morbidities  Goal: Patient's chronic conditions and co-morbidity symptoms are monitored and maintained or improved  Outcome: Not Progressing     Problem: Arrythmia/Dysrhythmia  Goal: Lab values return to normal range  Outcome: Not  Progressing  Goal: No evidence of post procedure complications  Outcome: Not Progressing  Goal: Promote self management  Outcome: Not Progressing  Goal: Serial ECG will return to baseline  Outcome: Not Progressing  Goal: Verbalize understanding of procedures/devices  Outcome: Not Progressing  Goal: Vital signs return to baseline  Outcome: Not Progressing  Goal: Care and maintenance of device (specify)  Outcome: Not Progressing

## 2023-10-25 NOTE — NURSING NOTE
With rate dropped to 5 patient remains with HR 80's.  Patient currently up to bathroom with 's.  After back to bed 130's.

## 2023-10-25 NOTE — PROGRESS NOTES
Jonah Britton is a 33 y.o. male on day 1 of admission presenting with Atrial fibrillation with RVR (CMS/HCC).      Subjective   Jonah Britton is a 33 y.o. male with no significant past medical history presenting with chest pain and palpitations.  Symptoms started about Thursday about 5 days prior to presentation with generally feeling unwell chest pain on deep breathing.  There was associated fever which ranged from 100 to 101 °F.  There was associated diaphoresis.  He is unsure if she had any palpitations.  On Thursday he went to [facility] where he was seen in the ED with EKGs and blood work done with no significant findings.  He denies any nausea, vomiting or abdominal pain.  He felt better by Monday 1 day prior to presentation however symptoms recurred overnight which led to his presentation to the emergency room for further management.     On admission in the emergency room he was hemodynamically stable with a blood pressure of 154/63 mmHg but was tachycardic and in A-fib with a heart rate of 151/min, respirate rate of 18/min, temperature of 36.3 °C and was saturating 99 to 100% on room air.  EKG showed A-fib RVR with a rate of up to 179/min.  Initial CBC and BMP were unremarkable. High-sensitivity troponin was elevated at 84.  Chest x-ray was done which showed mild cardiomegaly without any other radiographic evidence of CHF or pneumonia.  He has been admitted for further evaluation and management.  10/25: Patient was seen and examined.  A-fib is now rate controlled.  Echocardiogram completed shows an ejection fraction of 50 to 55% with moderate to severe pericardial effusion.  This is suggestive of possible pericarditis.  Cardiologist on board and recommendations appreciated.  I will defer to cardiologist to consider anticoagulation in view of potential cardioversion.  TSH is within normal limits.  Repeat CBC BMP and magnesium in AM.       Objective     Last Recorded Vitals  /70 (BP Location:  Left arm, Patient Position: Lying)   Pulse 75   Temp 36.7 °C (98.1 °F) (Temporal)   Resp 18   Wt 107 kg (235 lb)   SpO2 98%   Intake/Output last 3 Shifts:    Intake/Output Summary (Last 24 hours) at 10/25/2023 1650  Last data filed at 10/25/2023 0535  Gross per 24 hour   Intake 898 ml   Output --   Net 898 ml       Admission Weight  Weight: 107 kg (235 lb) (10/24/23 0917)    Daily Weight  10/24/23 : 107 kg (235 lb)    Image Results  Transthoracic Echo (TTE) [place] [address]       Phone [phone] Fax [phone]    TRANSTHORACIC ECHOCARDIOGRAM REPORT       Patient Name:      SHELBY AGUILAR       Reading Physician:    [id number] Neal Grant MD  Study Date:        10/25/2023           Ordering Provider:    [id number] ERIC BARRON  MRN/PID:           [id number]             Fellow:  Accession#:        [id number]         Nurse:  Date of Birth/Age: 1990 / 33      Sonographer:          Louise Ramirez RDCS                     years  Gender:            M                    Additional Staff:  Height:            195.58 cm            Admit Date:           10/24/2023  Weight:            106.60 kg            Admission Status:     Inpatient -                                                                Routine  BSA:               2.40 m2              Department Location:  [facility] Echo                                                                Lab  Blood Pressure: 121 /81 mmHg    Study Type:    TRANSTHORACIC ECHO (TTE) COMPLETE  Diagnosis/ICD: Unspecified atrial fibrillation-I48.91  Indication:    Atrial Fibrillation  CPT Codes:     Echo Complete w Full Doppler-[id number]    Patient History: No pertinent past medical history.  Study Detail: The following Echo studies were performed: 2D, M-Mode, Doppler and                 color flow.       PHYSICIAN INTERPRETATION:  Left Ventricle: Left ventricular systolic function is low normal, with an estimated ejection fraction of 50-55%. There are no regional wall motion abnormalities. The left ventricular cavity size is normal. Left ventricular diastolic filling was indeterminate.  Left Atrium: The left atrium is normal in size.  Right Ventricle: The right ventricle is normal in size. There is reduced right ventricular systolic function.  Right Atrium: The right atrium is normal in size.  Aortic Valve: The aortic valve is trileaflet. There is no evidence of aortic valve regurgitation. The peak instantaneous gradient of the aortic valve is 6.7 mmHg. The mean gradient of the aortic valve is 3.0 mmHg.  Mitral Valve: The mitral valve is normal in structure. There is no evidence of mitral valve regurgitation.  Tricuspid Valve: The tricuspid valve is structurally normal. There is trace tricuspid regurgitation.  Pulmonic Valve: The pulmonic valve is structurally normal. There is no indication of pulmonic valve regurgitation.  Pericardium: There is a moderate to large pericardial effusion. There is no evidence of cardiac tamponade.  Aorta: The aortic root is normal.       CONCLUSIONS:   1. Left ventricular systolic function is low normal with a 50-55% estimated ejection fraction.   2. There is reduced right ventricular systolic function.   3. Moderate to large pericardial effusion.   4. There is no evidence of cardiac tamponade.    QUANTITATIVE DATA SUMMARY:  2D MEASUREMENTS:                           Normal Ranges:  Ao Root d:     3.20 cm   (2.0-3.7cm)  LAs:           4.20 cm   (2.7-4.0cm)  IVSd:          1.00 cm   (0.6-1.1cm)  LVPWd:         1.00 cm   (0.6-1.1cm)  LVIDd:         4.40 cm   (3.9-5.9cm)  LVIDs:         3.20 cm  LV Mass Index: 61.7 g/m2  LV % FS        27.3 %    LA VOLUME:                                Normal Ranges:  LA Vol A4C:        45.0 ml    (22+/-6mL/m2)  LA Vol A2C:         45.4 ml  LA Vol BP:         50.6 ml  LA Vol Index A4C:  18.8ml/m2  LA Vol Index A2C:  18.9 ml/m2  LA Vol Index BP:   21.1 ml/m2  LA Area A4C:       18.4 cm2  LA Area A2C:       16.5 cm2  LA Major Axis A4C: 6.4 cm  LA Major Axis A2C: 5.1 cm  LA Volume Index:   21.1 ml/m2    RA VOLUME BY A/L METHOD:                        Normal Ranges:  RA Area A4C: 16.4 cm2    AORTA MEASUREMENTS:                       Normal Ranges:  Ao Sinus, d: 3.20 cm (2.1-3.5cm)  Ao STJ, d:   2.70 cm (1.7-3.4cm)  Asc Ao, d:   2.80 cm (2.1-3.4cm)    LV SYSTOLIC FUNCTION BY 2D PLANIMETRY (MOD):                      Normal Ranges:  EF-A4C View: 49.1 % (>=55%)  EF-A2C View: 47.8 %  EF-Biplane:  46.3 %    LV DIASTOLIC FUNCTION:                         Normal Ranges:  MV Peak E:    0.83 m/s (0.7-1.2 m/s)  MV e'         0.11 m/s (>8.0)  MV lateral e' 0.10 m/s  MV medial e'  0.12 m/s  E/e' Ratio:   7.58     (<8.0)    AORTIC VALVE:                                    Normal Ranges:  AoV Vmax:                1.29 m/s (<=1.7m/s)  AoV Peak P.7 mmHg (<20mmHg)  AoV Mean PG:             3.0 mmHg (1.7-11.5mmHg)  LVOT Max Red:            0.83 m/s (<=1.1m/s)  AoV VTI:                 20.00 cm (18-25cm)  LVOT VTI:                16.30 cm  LVOT Diameter:           2.40 cm  (1.8-2.4cm)  AoV Area, VTI:           3.69 cm2 (2.5-5.5cm2)  AoV Area,Vmax:           2.92 cm2 (2.5-4.5cm2)  AoV Dimensionless Index: 0.82       RIGHT VENTRICLE:  RV Basal 3.20 cm  RV Mid   2.70 cm  RV Major 8.2 cm  TAPSE:   16.5 mm  RV s'    0.15 m/s    TRICUSPID VALVE/RVSP:                    Normal Ranges:  IVC Diam: 1.80 cm       [id number] Neal Grant MD  Electronically signed on 10/25/2023 at 9:05:44 AM       ** Final **      Physical Exam  Constitutional:       Appearance: Normal appearance.   HENT:      Head: Normocephalic and atraumatic.      Mouth/Throat:      Mouth: Mucous membranes are moist.   Eyes:      Pupils: Pupils are equal, round, and reactive to light.    Cardiovascular:      Rate and Rhythm: Tachycardia present. Rhythm irregular.      Pulses: Normal pulses.   Pulmonary:      Effort: Pulmonary effort is normal. No respiratory distress.      Breath sounds: Normal breath sounds.   Abdominal:      General: Abdomen is flat. Bowel sounds are normal.      Palpations: Abdomen is soft.   Neurological:      General: No focal deficit present.      Mental Status: He is alert and oriented to person, place, and time.   Psychiatric:         Mood and Affect: Mood normal.         Behavior: Behavior normal.         Thought Content: Thought content normal.      Relevant Results               Assessment/Plan      Atrial fibrillation with RVR  Admitted under telemetry  Slightly elevated high-sensitivity troponin so I will cycle more  IV Cardizem drip  [id number] score is at least 1 so I will hold off on anticoagulation and start aspirin  Echocardiogram shows an EF of 50 to 55% with moderate to severe pericardial effusion  TSH is within normal limits  Lipid panel and hemoglobin A1c in a.m.  Cardiology on board and recommendations appreciated    Chest pain secondary to pericarditis  Likely associated with A-fib RVR above  Will benefit from NSAIDs versus colchicine; will defer to cardiologist  Start p.o. aspirin and statin  Echocardiogram as above  Cardiology on board and recommendations appreciated      Hypertension.    Not controlled with IV Cardizem drip  Continue to monitor daily        DVT prophylaxis  Subcutaneous Lovenox            Principal Problem:    Atrial fibrillation with RVR (CMS/HCC)                  Brittany Hernandez MD

## 2023-10-26 ENCOUNTER — PHARMACY VISIT (OUTPATIENT)
Dept: PHARMACY | Facility: CLINIC | Age: 33
End: 2023-10-26
Payer: COMMERCIAL

## 2023-10-26 VITALS
HEART RATE: 64 BPM | RESPIRATION RATE: 17 BRPM | WEIGHT: 234.79 LBS | BODY MASS INDEX: 27.72 KG/M2 | DIASTOLIC BLOOD PRESSURE: 72 MMHG | SYSTOLIC BLOOD PRESSURE: 131 MMHG | OXYGEN SATURATION: 97 % | TEMPERATURE: 97.4 F | HEIGHT: 77 IN

## 2023-10-26 PROBLEM — I48.91 ATRIAL FIBRILLATION WITH RVR (MULTI): Status: RESOLVED | Noted: 2023-10-24 | Resolved: 2023-10-26

## 2023-10-26 LAB
ANION GAP SERPL CALC-SCNC: 11 MMOL/L (ref 10–20)
BUN SERPL-MCNC: 12 MG/DL (ref 6–23)
CALCIUM SERPL-MCNC: 9.3 MG/DL (ref 8.6–10.3)
CHLORIDE SERPL-SCNC: 102 MMOL/L (ref 98–107)
CO2 SERPL-SCNC: 28 MMOL/L (ref 21–32)
CREAT SERPL-MCNC: 0.78 MG/DL (ref 0.5–1.3)
ERYTHROCYTE [DISTWIDTH] IN BLOOD BY AUTOMATED COUNT: 12.2 % (ref 11.5–14.5)
GFR SERPL CREATININE-BSD FRML MDRD: >90 ML/MIN/1.73M*2
GLUCOSE SERPL-MCNC: 91 MG/DL (ref 74–99)
HCT VFR BLD AUTO: 39.9 % (ref 41–52)
HGB BLD-MCNC: 13.1 G/DL (ref 13.5–17.5)
MCH RBC QN AUTO: 28.1 PG (ref 26–34)
MCHC RBC AUTO-ENTMCNC: 32.8 G/DL (ref 32–36)
MCV RBC AUTO: 86 FL (ref 80–100)
NRBC BLD-RTO: 0 /100 WBCS (ref 0–0)
PLATELET # BLD AUTO: 378 X10*3/UL (ref 150–450)
PMV BLD AUTO: 9.1 FL (ref 7.5–11.5)
POTASSIUM SERPL-SCNC: 4.2 MMOL/L (ref 3.5–5.3)
RBC # BLD AUTO: 4.66 X10*6/UL (ref 4.5–5.9)
SODIUM SERPL-SCNC: 137 MMOL/L (ref 136–145)
WBC # BLD AUTO: 7.4 X10*3/UL (ref 4.4–11.3)

## 2023-10-26 PROCEDURE — 99233 SBSQ HOSP IP/OBS HIGH 50: CPT | Performed by: INTERNAL MEDICINE

## 2023-10-26 PROCEDURE — RXMED WILLOW AMBULATORY MEDICATION CHARGE

## 2023-10-26 PROCEDURE — 2500000004 HC RX 250 GENERAL PHARMACY W/ HCPCS (ALT 636 FOR OP/ED): Performed by: INTERNAL MEDICINE

## 2023-10-26 PROCEDURE — 85027 COMPLETE CBC AUTOMATED: CPT | Performed by: INTERNAL MEDICINE

## 2023-10-26 PROCEDURE — 80048 BASIC METABOLIC PNL TOTAL CA: CPT | Performed by: INTERNAL MEDICINE

## 2023-10-26 PROCEDURE — 2500000001 HC RX 250 WO HCPCS SELF ADMINISTERED DRUGS (ALT 637 FOR MEDICARE OP): Performed by: INTERNAL MEDICINE

## 2023-10-26 PROCEDURE — 36415 COLL VENOUS BLD VENIPUNCTURE: CPT | Performed by: INTERNAL MEDICINE

## 2023-10-26 PROCEDURE — 99239 HOSP IP/OBS DSCHRG MGMT >30: CPT | Performed by: INTERNAL MEDICINE

## 2023-10-26 RX ORDER — METOPROLOL TARTRATE 50 MG/1
50 TABLET ORAL 2 TIMES DAILY
Status: DISCONTINUED | OUTPATIENT
Start: 2023-10-26 | End: 2023-10-26 | Stop reason: HOSPADM

## 2023-10-26 RX ORDER — METOPROLOL TARTRATE 25 MG/1
25 TABLET, FILM COATED ORAL 2 TIMES DAILY
Qty: 60 TABLET | Refills: 0 | Status: SHIPPED | OUTPATIENT
Start: 2023-10-26 | End: 2023-11-07 | Stop reason: HOSPADM

## 2023-10-26 RX ORDER — IBUPROFEN 600 MG/1
600 TABLET ORAL 3 TIMES DAILY
Qty: 90 TABLET | Refills: 0 | Status: SHIPPED | OUTPATIENT
Start: 2023-10-26 | End: 2023-11-07 | Stop reason: HOSPADM

## 2023-10-26 RX ORDER — PANTOPRAZOLE SODIUM 40 MG/1
40 TABLET, DELAYED RELEASE ORAL
Qty: 30 TABLET | Refills: 0 | Status: SHIPPED | OUTPATIENT
Start: 2023-10-27 | End: 2023-11-07 | Stop reason: HOSPADM

## 2023-10-26 RX ORDER — COLCHICINE 0.6 MG/1
0.6 TABLET ORAL 2 TIMES DAILY
Qty: 60 TABLET | Refills: 0 | Status: ON HOLD | OUTPATIENT
Start: 2023-10-26 | End: 2023-11-07 | Stop reason: SDUPTHER

## 2023-10-26 RX ADMIN — ASPIRIN 81 MG: 81 TABLET, COATED ORAL at 09:02

## 2023-10-26 RX ADMIN — COLCHICINE 0.6 MG: 0.6 TABLET ORAL at 09:02

## 2023-10-26 RX ADMIN — IBUPROFEN 600 MG: 200 TABLET, FILM COATED ORAL at 14:13

## 2023-10-26 RX ADMIN — PANTOPRAZOLE SODIUM 40 MG: 40 TABLET, DELAYED RELEASE ORAL at 06:06

## 2023-10-26 RX ADMIN — IBUPROFEN 600 MG: 200 TABLET, FILM COATED ORAL at 09:02

## 2023-10-26 RX ADMIN — METOPROLOL TARTRATE 25 MG: 25 TABLET, FILM COATED ORAL at 06:06

## 2023-10-26 ASSESSMENT — PAIN SCALES - GENERAL: PAINLEVEL_OUTOF10: 0 - NO PAIN

## 2023-10-26 NOTE — DISCHARGE SUMMARY
Discharge Diagnosis  Atrial fibrillation with RVR (CMS/HCC)  Acute pericardial effusion  Acute pericarditis  Hypertension      Issues Requiring Follow-Up      Discharge Meds     Your medication list        START taking these medications        Instructions Last Dose Given Next Dose Due   colchicine (gout) 0.6 mg tablet      Take 1 tablet (0.6 mg) by mouth 2 times a day.       ibuprofen 600 mg tablet      Take 1 tablet (600 mg) by mouth 3 times a day.       metoprolol tartrate 25 mg tablet  Commonly known as: Lopressor      Take 1 tablet (25 mg) by mouth 2 times a day.       pantoprazole 40 mg EC tablet  Commonly known as: ProtoNix  Start taking on: October 27, 2023      Take 1 tablet (40 mg) by mouth once daily in the morning. Take before meals. Do not crush, chew, or split. Do not start before October 27, 2023.                 Where to Get Your Medications        These medications were sent to [facility]  [address] [address]      Hours: 8AM to 6PM Mon-Fri, 8AM to 2PM Sat, 8AM to 12PM Sun Phone: [phone]   colchicine (gout) 0.6 mg tablet  ibuprofen 600 mg tablet  metoprolol tartrate 25 mg tablet  pantoprazole 40 mg EC tablet         Test Results Pending At Discharge  Pending Labs       No current pending labs.            Hospital Course  Jonah Britton is a 33 y.o. male with no significant past medical history presenting with chest pain and palpitations.  Symptoms started about Thursday about 5 days prior to presentation with generally feeling unwell chest pain on deep breathing.  There was associated fever which ranged from 100 to 101 °F.  There was associated diaphoresis.  He is unsure if she had any palpitations.  On Thursday he went to [facility] where he was seen in the ED with EKGs and blood work done with no significant findings.  He denies any nausea, vomiting or abdominal pain.  He felt better by Monday 1 day prior to presentation however symptoms recurred  overnight which led to his presentation to the emergency room for further management.     On admission in the emergency room he was hemodynamically stable with a blood pressure of 154/63 mmHg but was tachycardic and in A-fib with a heart rate of 151/min, respirate rate of 18/min, temperature of 36.3 °C and was saturating 99 to 100% on room air.  EKG showed A-fib RVR with a rate of up to 179/min.  Initial CBC and BMP were unremarkable. High-sensitivity troponin was elevated at 84.  Chest x-ray was done which showed mild cardiomegaly without any other radiographic evidence of CHF or pneumonia.  He has been admitted for further evaluation and management.  10/25: Patient was seen and examined.  A-fib is now rate controlled.  Echocardiogram completed shows an ejection fraction of 50 to 55% with moderate to severe pericardial effusion.  This is suggestive of possible pericarditis.  Cardiologist on board and recommendations appreciated.  I will defer to cardiologist to consider anticoagulation in view of potential cardioversion.  TSH is within normal limits.  Repeat CBC BMP and magnesium in AM.  10/26: Patient was seen and examined.  A-fib RVR remains well controlled.  Seen by cardiologist who started on colchicine and ibuprofen for pericarditis and will continue p.o. metoprolol 25 mg twice daily.  He was discharged in stable condition to follow-up as outpatient with primary care physician within 1 week of discharge and with cardiologist as scheduled.      The discharge process took about 35 minutes.  Pertinent Physical Exam At Time of Discharge  Physical Exam  Constitutional:       Appearance: Normal appearance.   HENT:      Head: Normocephalic and atraumatic.      Mouth/Throat:      Mouth: Mucous membranes are moist.   Eyes:      Pupils: Pupils are equal, round, and reactive to light.   Cardiovascular:      Rate and Rhythm: Tachycardia present. Rhythm irregular.      Pulses: Normal pulses.   Pulmonary:      Effort:  Pulmonary effort is normal. No respiratory distress.      Breath sounds: Normal breath sounds.   Abdominal:      General: Abdomen is flat. Bowel sounds are normal.      Palpations: Abdomen is soft.   Neurological:      General: No focal deficit present.      Mental Status: He is alert and oriented to person, place, and time.   Psychiatric:         Mood and Affect: Mood normal.         Behavior: Behavior normal.         Thought Content: Thought content normal.      Outpatient Follow-Up  No future appointments.      Brittany Hernandez MD

## 2023-10-26 NOTE — CARE PLAN
The patient's goals for the shift include not have any palpitations or SOB    The clinical goals for the shift include patient will report cardiac symptoms of palpitations, HURD, diaphoresis    Over the shift, the patient did  make progress toward the following goals. Barriers to progression include afb. Recommendations to address these barriers include control heart rate.

## 2023-10-26 NOTE — NURSING NOTE
Went over discharge paperwork and medications with pt. Activity as tolerated, followup with cardiology by Oct. 31. Removed IV and telebox.

## 2023-10-26 NOTE — NURSING NOTE
Pt ambulated in hallway, with HR ranging between 120-140 with quick spikes to 167, pt asymptomatic during ambulation.

## 2023-10-26 NOTE — CARE PLAN
The patient's goals for the shift include not have any palpitations or SOB    The clinical goals for the shift include patient will remain in controlled afib

## 2023-10-26 NOTE — PROGRESS NOTES
Subjective Data:  Feeling better, was able to sleep last night on his back without any pain or SOB.  No LH/dizziness, presyncope.  Ambulating on [place] without exertional limitations.    Tele with Afib rate controlled in the 70-90's.    ROS:  The remainder of the review of systems was obtained, as was negative as pertains to the chief complaint.      Overnight Events:    None     Objective Data:  Last Recorded Vitals:  Vitals:    10/25/23 2035 10/26/23 0003 10/26/23 0611 10/26/23 0732   BP: 117/71 125/77 110/71 105/74   BP Location: Right arm Left arm Left arm Left arm   Patient Position: Lying Lying Lying Lying   Pulse: 83 93 72 82   Resp: 16 16 16 18   Temp: 36.1 °C (97 °F) 36.6 °C (97.8 °F) 36.2 °C (97.1 °F) 36.4 °C (97.6 °F)   TempSrc: Temporal Temporal Temporal Temporal   SpO2: 96% 98% 99% 98%   Weight:   107 kg (234 lb 12.6 oz)    Height:           Last Labs:  CBC - 10/26/2023:  5:50 AM  7.4 13.1 378    39.9      CMP - 10/26/2023:  5:50 AM  9.3 7.0 15 --- 0.6   3.4 4.0 16 56      PTT - No results in last year.  _   _ _     TROPHS   Date/Time Value Ref Range Status   10/24/2023 08:04 PM 55 0 - 20 ng/L Final     Comment:     Previous result verified on 10/24/2023 1138 on specimen/case [id number] called with component TRPHS for procedure Troponin I, High Sensitivity with value 84 ng/L.   10/24/2023 04:22 PM 46 0 - 20 ng/L Final   10/24/2023 12:06 PM 59 0 - 20 ng/L Final     Comment:     Previous result verified on 10/24/2023 1138 on specimen/case [id number] called with component TRPHS for procedure Troponin I, High Sensitivity with value 84 ng/L.     LDLCALC   Date/Time Value Ref Range Status   10/24/2023 04:22 PM 86 <=99 mg/dL Final     Comment:                                 Near   Borderline      AGE      Desirable  Optimal    High     High     Very High     0-19 Y     0 - 109     ---    110-129   >/= 130     ----    20-24 Y     0 - 119     ---    120-159   >/= 160     ----      >24 Y     0 -  99    "100-129  130-159   160-189     >/=190       VLDL   Date/Time Value Ref Range Status   10/24/2023 04:22 PM 15 0 - 40 mg/dL Final      Last I/O:  I/O last 3 completed shifts:  In: 898 (8.4 mL/kg) [P.O.:800; I.V.:98 (0.9 mL/kg)]  Out: - (0 mL/kg)   Weight: 106.5 kg     Past Cardiology Tests (Last 3 Years):  EKG:  ECG 12 Lead 10/26/2023      ECG 12 Lead 10/26/2023    Echo:  Transthoracic Echo (TTE) Complete 10/25/2023    Ejection Fractions:  No results found for: \"EF\"  Cath:  No results found for this or any previous visit from the past 1095 days.    Stress Test:  No results found for this or any previous visit from the past 1095 days.    Cardiac Imaging:  No results found for this or any previous visit from the past 1095 days.      Inpatient Medications:  Scheduled medications   Medication Dose Route Frequency    aspirin  81 mg oral Daily    colchicine (gout)  0.6 mg oral BID    ibuprofen  600 mg oral TID    metoprolol tartrate  25 mg oral 4x daily    pantoprazole  40 mg oral Daily before breakfast    Or    pantoprazole  40 mg intravenous Daily before breakfast    perflutren lipid microspheres  0.5-10 mL of dilution intravenous Once in imaging    perflutren protein A microsphere  0.5 mL intravenous Once in imaging    sulfur hexafluoride microsphr  2 mL intravenous Once in imaging     PRN medications   Medication    acetaminophen    melatonin    ondansetron    sennosides-docusate sodium     Continuous Medications   Medication Dose Last Rate       Physical Exam:  Physical Exam  HENT:      Head: Normocephalic and atraumatic.      Mouth/Throat:      Mouth: Mucous membranes are moist.   Eyes:      Extraocular Movements: Extraocular movements intact.   Cardiovascular:      Rate and Rhythm: Normal rate. Rhythm irregular.      Heart sounds:      Friction rub present.   Pulmonary:      Effort: Pulmonary effort is normal.      Breath sounds: Normal breath sounds.   Abdominal:      Palpations: Abdomen is soft.   Musculoskeletal: "      Cervical back: Neck supple.   Neurological:      General: No focal deficit present.      Mental Status: He is alert.   Psychiatric:         Mood and Affect: Mood normal.      + pericardial friction rub     Assessment/Plan   Pericardial effusion:  new pericardial effusion in setting what sounds like recent viral syndrome.  TSH WNL.  CRP elevated 7.76.  No prior h/o connective tissue d/o, malignancy, exposure to TB.  Currently hemodynamically stable.    TTE reviewed - mod-large pericardial effusion without echo signs of tamponade.  Rec:  initial conservative management for pericarditis with associated pericardial effusion with anti-inflammatory medications, IVF, avoidance of dehydration, etc.  If he fails conservative management, then may benefit from diagnostic/therapeutic pericardiocentesis.  -ibuprofen at anti-inflammatory dosing, 600mg PO three times daily  -pantoprazole for gastric protection with above  -colchicine 0.6mg bid - plan for 3 month course  -stopped atorvastatin d/t interaction with colchicine  -stopped ASA  -plan repeat TTE in 5-7 days to reevaluate size of effusion and reexamine for any hemodynamic effect of effusion  -avoid diuretics, dehydration  -if fails conservative therapy, may benefit from diagnostic/therapeutic pericardiocentesis  -needs follow up with me in 2 weeks     Atrial fibrillation:  new presentation of atrial fibrillation with RVR.  I personally reviewed the EKG on presentation, which demontrated Afib 's.  Suspect Afib in setting of above viral illness and pericarditis/pericardial effusion.  At this point, I would advocate rate control.  If his pericarditis / effusion improve but he remains in Afib, may recommend for outpatient [facility].    -tele  -initial rate control strategy due to above  -due to interaction with colchicine, would wean off diltiazem  -metoprolol tartrate 25mg PO q6hrs for rate control > switch to metop tartrate 50mg bid  -with pericardial effusion,  will avoid chronic anticoagulation - also [id number] is 0, placing him at low risk for CVA     Elevated troponin:  minimally elevated troponin without chest pain syndrome.  Suspect 2/2 pericarditis.  -monitor for chest pain    Peripheral IV 10/24/23 20 G Right Antecubital (Active)   Site Assessment Clean;Dry 10/26/23 0000   Dressing Status Clean;Dry 10/26/23 0000   Number of days: 2       Code Status:  Full Code      Luana Bergeron MD

## 2023-10-31 ENCOUNTER — TELEPHONE (OUTPATIENT)
Dept: CARDIOLOGY | Facility: CLINIC | Age: 33
End: 2023-10-31
Payer: COMMERCIAL

## 2023-10-31 ENCOUNTER — HOSPITAL ENCOUNTER (EMERGENCY)
Facility: HOSPITAL | Age: 33
Discharge: OTHER INSTITUTION | End: 2023-11-01
Attending: EMERGENCY MEDICINE
Payer: COMMERCIAL

## 2023-10-31 ENCOUNTER — APPOINTMENT (OUTPATIENT)
Dept: RADIOLOGY | Facility: HOSPITAL | Age: 33
End: 2023-10-31
Payer: COMMERCIAL

## 2023-10-31 ENCOUNTER — HOSPITAL ENCOUNTER (OUTPATIENT)
Dept: CARDIOLOGY | Facility: HOSPITAL | Age: 33
Discharge: HOME | End: 2023-10-31
Payer: COMMERCIAL

## 2023-10-31 DIAGNOSIS — I31.39 PERICARDIAL EFFUSION (HHS-HCC): ICD-10-CM

## 2023-10-31 LAB
ALBUMIN SERPL BCP-MCNC: 4.2 G/DL (ref 3.4–5)
ALP SERPL-CCNC: 61 U/L (ref 33–120)
ALT SERPL W P-5'-P-CCNC: 27 U/L (ref 10–52)
ANION GAP SERPL CALC-SCNC: 14 MMOL/L (ref 10–20)
APPEARANCE UR: CLEAR
AST SERPL W P-5'-P-CCNC: 16 U/L (ref 9–39)
BASOPHILS # BLD AUTO: 0.04 X10*3/UL (ref 0–0.1)
BASOPHILS NFR BLD AUTO: 0.3 %
BILIRUB SERPL-MCNC: 0.6 MG/DL (ref 0–1.2)
BILIRUB UR STRIP.AUTO-MCNC: NEGATIVE MG/DL
BUN SERPL-MCNC: 10 MG/DL (ref 6–23)
CALCIUM SERPL-MCNC: 9.2 MG/DL (ref 8.6–10.3)
CARDIAC TROPONIN I PNL SERPL HS: <3 NG/L (ref 0–20)
CHLORIDE SERPL-SCNC: 100 MMOL/L (ref 98–107)
CO2 SERPL-SCNC: 27 MMOL/L (ref 21–32)
COLOR UR: YELLOW
CREAT SERPL-MCNC: 0.86 MG/DL (ref 0.5–1.3)
D DIMER PPP FEU-MCNC: 6684 NG/ML FEU
EOSINOPHIL # BLD AUTO: 0.21 X10*3/UL (ref 0–0.7)
EOSINOPHIL NFR BLD AUTO: 1.6 %
ERYTHROCYTE [DISTWIDTH] IN BLOOD BY AUTOMATED COUNT: 12.1 % (ref 11.5–14.5)
FLUAV RNA RESP QL NAA+PROBE: NOT DETECTED
FLUBV RNA RESP QL NAA+PROBE: NOT DETECTED
GFR SERPL CREATININE-BSD FRML MDRD: >90 ML/MIN/1.73M*2
GLUCOSE SERPL-MCNC: 91 MG/DL (ref 74–99)
GLUCOSE UR STRIP.AUTO-MCNC: NEGATIVE MG/DL
HCT VFR BLD AUTO: 34.2 % (ref 41–52)
HGB BLD-MCNC: 11.3 G/DL (ref 13.5–17.5)
IMM GRANULOCYTES # BLD AUTO: 0.06 X10*3/UL (ref 0–0.7)
IMM GRANULOCYTES NFR BLD AUTO: 0.5 % (ref 0–0.9)
KETONES UR STRIP.AUTO-MCNC: ABNORMAL MG/DL
LACTATE SERPL-SCNC: 0.7 MMOL/L (ref 0.4–2)
LEUKOCYTE ESTERASE UR QL STRIP.AUTO: NEGATIVE
LYMPHOCYTES # BLD AUTO: 1.34 X10*3/UL (ref 1.2–4.8)
LYMPHOCYTES NFR BLD AUTO: 10.1 %
MCH RBC QN AUTO: 28.5 PG (ref 26–34)
MCHC RBC AUTO-ENTMCNC: 33 G/DL (ref 32–36)
MCV RBC AUTO: 86 FL (ref 80–100)
MONOCYTES # BLD AUTO: 1.08 X10*3/UL (ref 0.1–1)
MONOCYTES NFR BLD AUTO: 8.1 %
NEUTROPHILS # BLD AUTO: 10.53 X10*3/UL (ref 1.2–7.7)
NEUTROPHILS NFR BLD AUTO: 79.4 %
NITRITE UR QL STRIP.AUTO: NEGATIVE
NRBC BLD-RTO: 0 /100 WBCS (ref 0–0)
PH UR STRIP.AUTO: 7 [PH]
PLATELET # BLD AUTO: 362 X10*3/UL (ref 150–450)
PMV BLD AUTO: 9 FL (ref 7.5–11.5)
POTASSIUM SERPL-SCNC: 3.6 MMOL/L (ref 3.5–5.3)
PROT SERPL-MCNC: 7.2 G/DL (ref 6.4–8.2)
PROT UR STRIP.AUTO-MCNC: NEGATIVE MG/DL
RBC # BLD AUTO: 3.96 X10*6/UL (ref 4.5–5.9)
RBC # UR STRIP.AUTO: NEGATIVE /UL
SARS-COV-2 RNA RESP QL NAA+PROBE: NOT DETECTED
SODIUM SERPL-SCNC: 137 MMOL/L (ref 136–145)
SP GR UR STRIP.AUTO: 1.01
UROBILINOGEN UR STRIP.AUTO-MCNC: <2 MG/DL
WBC # BLD AUTO: 13.3 X10*3/UL (ref 4.4–11.3)

## 2023-10-31 PROCEDURE — 93308 TTE F-UP OR LMTD: CPT

## 2023-10-31 PROCEDURE — 71275 CT ANGIOGRAPHY CHEST: CPT

## 2023-10-31 PROCEDURE — 2500000001 HC RX 250 WO HCPCS SELF ADMINISTERED DRUGS (ALT 637 FOR MEDICARE OP): Performed by: EMERGENCY MEDICINE

## 2023-10-31 PROCEDURE — 2550000001 HC RX 255 CONTRASTS: Performed by: EMERGENCY MEDICINE

## 2023-10-31 PROCEDURE — 84484 ASSAY OF TROPONIN QUANT: CPT | Performed by: EMERGENCY MEDICINE

## 2023-10-31 PROCEDURE — 96366 THER/PROPH/DIAG IV INF ADDON: CPT

## 2023-10-31 PROCEDURE — 96361 HYDRATE IV INFUSION ADD-ON: CPT

## 2023-10-31 PROCEDURE — 80053 COMPREHEN METABOLIC PANEL: CPT | Performed by: EMERGENCY MEDICINE

## 2023-10-31 PROCEDURE — 85025 COMPLETE CBC W/AUTO DIFF WBC: CPT | Performed by: EMERGENCY MEDICINE

## 2023-10-31 PROCEDURE — 99285 EMERGENCY DEPT VISIT HI MDM: CPT | Mod: 25

## 2023-10-31 PROCEDURE — 71046 X-RAY EXAM CHEST 2 VIEWS: CPT | Performed by: RADIOLOGY

## 2023-10-31 PROCEDURE — 71275 CT ANGIOGRAPHY CHEST: CPT | Performed by: SURGERY

## 2023-10-31 PROCEDURE — 87636 SARSCOV2 & INF A&B AMP PRB: CPT | Performed by: EMERGENCY MEDICINE

## 2023-10-31 PROCEDURE — 87449 NOS EACH ORGANISM AG IA: CPT | Mod: PORLAB | Performed by: EMERGENCY MEDICINE

## 2023-10-31 PROCEDURE — 2500000004 HC RX 250 GENERAL PHARMACY W/ HCPCS (ALT 636 FOR OP/ED): Performed by: EMERGENCY MEDICINE

## 2023-10-31 PROCEDURE — 71046 X-RAY EXAM CHEST 2 VIEWS: CPT | Mod: FY

## 2023-10-31 PROCEDURE — 96365 THER/PROPH/DIAG IV INF INIT: CPT | Mod: 59

## 2023-10-31 PROCEDURE — 83605 ASSAY OF LACTIC ACID: CPT | Performed by: EMERGENCY MEDICINE

## 2023-10-31 PROCEDURE — 87632 RESP VIRUS 6-11 TARGETS: CPT | Performed by: EMERGENCY MEDICINE

## 2023-10-31 PROCEDURE — 81003 URINALYSIS AUTO W/O SCOPE: CPT | Performed by: EMERGENCY MEDICINE

## 2023-10-31 PROCEDURE — 87899 AGENT NOS ASSAY W/OPTIC: CPT | Mod: PORLAB | Performed by: EMERGENCY MEDICINE

## 2023-10-31 PROCEDURE — 87040 BLOOD CULTURE FOR BACTERIA: CPT | Mod: PORLAB | Performed by: EMERGENCY MEDICINE

## 2023-10-31 PROCEDURE — 94762 N-INVAS EAR/PLS OXIMTRY CONT: CPT

## 2023-10-31 PROCEDURE — 36415 COLL VENOUS BLD VENIPUNCTURE: CPT | Performed by: EMERGENCY MEDICINE

## 2023-10-31 PROCEDURE — 96372 THER/PROPH/DIAG INJ SC/IM: CPT

## 2023-10-31 PROCEDURE — 85379 FIBRIN DEGRADATION QUANT: CPT | Performed by: EMERGENCY MEDICINE

## 2023-10-31 PROCEDURE — 93308 TTE F-UP OR LMTD: CPT | Performed by: INTERNAL MEDICINE

## 2023-10-31 PROCEDURE — 87075 CULTR BACTERIA EXCEPT BLOOD: CPT | Mod: PORLAB | Performed by: EMERGENCY MEDICINE

## 2023-10-31 PROCEDURE — 99284 EMERGENCY DEPT VISIT MOD MDM: CPT | Mod: 25 | Performed by: EMERGENCY MEDICINE

## 2023-10-31 PROCEDURE — 84155 ASSAY OF PROTEIN SERUM: CPT | Performed by: EMERGENCY MEDICINE

## 2023-10-31 RX ORDER — METOPROLOL TARTRATE 25 MG/1
25 TABLET, FILM COATED ORAL ONCE
Status: COMPLETED | OUTPATIENT
Start: 2023-10-31 | End: 2023-10-31

## 2023-10-31 RX ORDER — KETOROLAC TROMETHAMINE 30 MG/ML
15 INJECTION, SOLUTION INTRAMUSCULAR; INTRAVENOUS ONCE
Status: COMPLETED | OUTPATIENT
Start: 2023-10-31 | End: 2023-10-31

## 2023-10-31 RX ORDER — COLCHICINE 0.6 MG/1
0.6 TABLET ORAL 2 TIMES DAILY
Status: DISCONTINUED | OUTPATIENT
Start: 2023-10-31 | End: 2023-11-01 | Stop reason: HOSPADM

## 2023-10-31 RX ORDER — ACETAMINOPHEN 325 MG/1
975 TABLET ORAL ONCE
Status: COMPLETED | OUTPATIENT
Start: 2023-10-31 | End: 2023-10-31

## 2023-10-31 RX ADMIN — COLCHICINE 0.6 MG: 0.6 TABLET ORAL at 22:17

## 2023-10-31 RX ADMIN — METOPROLOL TARTRATE 25 MG: 25 TABLET, FILM COATED ORAL at 19:51

## 2023-10-31 RX ADMIN — ACETAMINOPHEN 975 MG: 325 TABLET ORAL at 19:01

## 2023-10-31 RX ADMIN — VANCOMYCIN HYDROCHLORIDE 2 G: 10 INJECTION, POWDER, LYOPHILIZED, FOR SOLUTION INTRAVENOUS at 20:47

## 2023-10-31 RX ADMIN — IOHEXOL 50 ML: 350 INJECTION, SOLUTION INTRAVENOUS at 20:39

## 2023-10-31 RX ADMIN — KETOROLAC TROMETHAMINE 15 MG: 30 INJECTION, SOLUTION INTRAMUSCULAR at 19:02

## 2023-10-31 RX ADMIN — SODIUM CHLORIDE, POTASSIUM CHLORIDE, SODIUM LACTATE AND CALCIUM CHLORIDE 500 ML: 600; 310; 30; 20 INJECTION, SOLUTION INTRAVENOUS at 19:03

## 2023-10-31 RX ADMIN — DOXYCYCLINE 100 MG: 100 INJECTION, POWDER, LYOPHILIZED, FOR SOLUTION INTRAVENOUS at 23:25

## 2023-10-31 RX ADMIN — PIPERACILLIN SODIUM AND TAZOBACTAM SODIUM 4.5 G: 4; .5 INJECTION, SOLUTION INTRAVENOUS at 19:53

## 2023-10-31 ASSESSMENT — PAIN - FUNCTIONAL ASSESSMENT: PAIN_FUNCTIONAL_ASSESSMENT: 0-10

## 2023-10-31 ASSESSMENT — PAIN SCALES - GENERAL: PAINLEVEL_OUTOF10: 6

## 2023-10-31 ASSESSMENT — PAIN DESCRIPTION - ORIENTATION: ORIENTATION: LEFT

## 2023-10-31 ASSESSMENT — PAIN DESCRIPTION - LOCATION: LOCATION: SHOULDER

## 2023-10-31 NOTE — ED PROVIDER NOTES
HPI   Chief Complaint   Patient presents with    Fever     L shoulder pain.   Was admit here last thurs.  With pericarditis.   Sweating at night.  Fever x 2 days       The patient is a 33-year-old male past medical history of pericarditis, recently admitted for pericardial effusion/pericarditis with subsequent atrial fibrillation with RVR.  Patient was discharged on 10/26 with prescription for ibuprofen, colchicine, metoprolol.  Patient notes that he was feeling relatively well at discharge, over the last 2-3 days began to develop worsening pain mainly over the left lateral aspect of his chest/back.  Pain did seem to be worsened with deep inspiration as well as with exertion.  Notes that he had been experiencing fevers at home over the last 2 days as well with a maximal temperature of 101 °F at home.  Patient did undergo outpatient echocardiography which per outpatient cardiologist did show an increased pericardial effusion.  Patient was referred to the emergency department for further evaluation of fever, worsening symptoms by his outpatient cardiologist.  At time of evaluation patient notes ongoing left-sided chest discomfort, notes that he has been taking his medications as prescribed over the last few days.  Notes mild dyspnea with exertion as well.  Denies any lower extremity edema or pain.  Denies any hemoptysis.  Denies any abdominal pain, vomiting, diarrhea, Dysuria, back pain, flank pain.  Denies any headache, neck pain.  Denies any rash.      History provided by:  Medical records                      No data recorded                Patient History   No past medical history on file.  No past surgical history on file.  No family history on file.  Social History     Tobacco Use    Smoking status: Former     Types: Cigarettes     Quit date: 10/1/2019     Years since quittin.0    Smokeless tobacco: Never   Substance Use Topics    Alcohol use: Not on file    Drug use: Not on file       Physical Exam   ED  Triage Vitals   Temp Heart Rate Resp BP   10/31/23 1617 10/31/23 1617 10/31/23 1617 10/31/23 1617   37.1 °C (98.7 °F) 85 16 131/73      SpO2 Temp src Heart Rate Source Patient Position   10/31/23 1617 -- 10/31/23 1645 10/31/23 1645   99 %  Monitor Sitting      BP Location FiO2 (%)     10/31/23 1645 --     Right arm        Physical Exam  Vitals and nursing note reviewed.   Constitutional:       General: He is not in acute distress.     Appearance: Normal appearance. He is ill-appearing. He is not toxic-appearing or diaphoretic.   HENT:      Head: Normocephalic and atraumatic.      Nose: Nose normal.      Mouth/Throat:      Mouth: Mucous membranes are moist.      Pharynx: No oropharyngeal exudate or posterior oropharyngeal erythema.   Eyes:      General: No scleral icterus.        Right eye: No discharge.         Left eye: No discharge.      Extraocular Movements: Extraocular movements intact.      Conjunctiva/sclera: Conjunctivae normal.      Pupils: Pupils are equal, round, and reactive to light.   Cardiovascular:      Rate and Rhythm: Regular rhythm. Tachycardia present.      Pulses: Normal pulses.      Heart sounds: Murmur heard.      Friction rub present. No gallop.   Pulmonary:      Effort: No tachypnea or bradypnea.      Breath sounds: No stridor, decreased air movement or transmitted upper airway sounds. Rhonchi present. No decreased breath sounds, wheezing or rales.   Abdominal:      General: Abdomen is flat. Bowel sounds are normal. There is no distension.      Palpations: Abdomen is soft. There is no mass.      Tenderness: There is no abdominal tenderness. There is no right CVA tenderness, left CVA tenderness, guarding or rebound.      Hernia: No hernia is present.   Musculoskeletal:      Cervical back: Normal range of motion and neck supple. No rigidity or tenderness.   Skin:     General: Skin is warm and dry.      Capillary Refill: Capillary refill takes less than 2 seconds.      Coloration: Skin is not  cyanotic, mottled or pale.   Neurological:      General: No focal deficit present.      Mental Status: He is alert and oriented to person, place, and time.      GCS: GCS eye subscore is 4. GCS verbal subscore is 5. GCS motor subscore is 6.      Cranial Nerves: Cranial nerves 2-12 are intact.      Sensory: No sensory deficit.      Motor: No weakness.         ED Course & [facility]   ED Course as of 10/31/23 2209   Tu Oct 31, 2023   2109 Contacted via the transfer center, patient was discussed with Dr. Miguel of cardiology who agrees to accept the patient in transfer, will move patient by ALS ground. [BR]   2156 Independently looked at echo - moderate sized appearing effusion, doesn't look like there's echo findings of tamponade [WJ]      ED Course User Index  [BR] Justino Cole MD  [WJ] Ted Mullen, DO       Medical Decision Making  Patient presenting with fever, worsening left-sided chest discomfort after recent diagnosis of pericarditis with moderate-large pericardial effusion.  On examination patient mildly tachycardic but otherwise hemodynamically stable, has warm, well-perfused extremities.  Patient was notably febrile in the emergency department, given recent hospitalization with treatment with colchicine did elect to obtain blood cultures, urine culture, and initiate broad-spectrum antibiotics.  Had initially planned on admission to [facility] however was contacted by Dr. Juarez of cardiology at [facility] conveying that the patient's family had reached out to him concerning potential transfer to a higher level of care (patient's family member a  employee), he conveyed that he felt this was appropriate to transfer the patient at this time.  Did contact Dr. Bergeron (patient's primary cardiologist) who was updated regarding transfer plan.  I did review outpatient echocardiography today which does show large pericardial effusion but at least to my eye does not demonstrate any significant evidence of  cardiac tamponade.   Consideration for bacteremia, viral syndrome, worsening pericarditis, myocarditis, pulmonary embolism given recent admission, pneumonia.  ECG showing sinus rhythm without ischemic changes.  X-ray did show some pulm bibasilar atelectasis/pulmonary edema with questionable left lower lobe infiltrate, CT angiography was obtained to assess for pulmonary embolism and showed no evidence of PE.  Did show questionable lower lobe infiltrate, therefore added doxycycline to vancomycin/Zosyn for atypical coverage, did also obtain urine Legionella/pneumococcus antigen testing.  Patient defervesced appropriately with IV Toradol/oral acetaminophen.  Did administer his evening dose of metoprolol, colchicine.  Patient's labs remarkable for normal troponin, reassuring CMP, did have a leukocytosis of 13,000.  Patient's tachycardia improved after resolution of fever/small IV fluid bolus.  Patient stating he feels much better after acetaminophen/Toradol/fluid administration.  Case was discussed with Dr. Miguel of cardiology at [facility] who agrees to accept the patient in transfer.  Patient pending [facility] bed/transfer.  Patient stable at time of signout.    Amount and/or Complexity of Data Reviewed  Independent Historian: parent and guardian  External Data Reviewed: labs and radiology.        Procedure  Procedures     Justino Cole MD  10/31/23 7598

## 2023-10-31 NOTE — TELEPHONE ENCOUNTER
Patient calling in because he had an echo this am and is concerned because he is not feeling well.  He tells me for several days he has had a temperature highest was on Sunday at 101.  Took tylenol yesterday and his temp was 99.9. He has an apple watch and thinks that he is back in a regular rhythm.  Is concerned because he is unable to lie down flat at night to sleep and really has not slept since he was discharged form the hospital.  Pin in his shoulder has gotten worse since he was discharged as well.  Has been taking colchicine and ibuprofen as instructed. Updated Dr Bergeron.  She called patient after reviewing echo and advised him to go to the ER.

## 2023-11-01 ENCOUNTER — HOSPITAL ENCOUNTER (INPATIENT)
Facility: HOSPITAL | Age: 33
LOS: 6 days | Discharge: HOME | DRG: 315 | End: 2023-11-07
Attending: INTERNAL MEDICINE | Admitting: INTERNAL MEDICINE
Payer: COMMERCIAL

## 2023-11-01 VITALS
HEART RATE: 86 BPM | HEIGHT: 77 IN | OXYGEN SATURATION: 97 % | DIASTOLIC BLOOD PRESSURE: 76 MMHG | RESPIRATION RATE: 18 BRPM | WEIGHT: 235 LBS | TEMPERATURE: 99.5 F | BODY MASS INDEX: 27.75 KG/M2 | SYSTOLIC BLOOD PRESSURE: 122 MMHG

## 2023-11-01 DIAGNOSIS — I31.39 PERICARDIAL EFFUSION (HHS-HCC): ICD-10-CM

## 2023-11-01 DIAGNOSIS — I32: Primary | ICD-10-CM

## 2023-11-01 DIAGNOSIS — Q21.10: Primary | ICD-10-CM

## 2023-11-01 PROBLEM — I30.0 ACUTE IDIOPATHIC PERICARDITIS (HHS-HCC): Status: ACTIVE | Noted: 2023-11-01

## 2023-11-01 LAB
EJECTION FRACTION APICAL 4 CHAMBER: 42.2
LEGIONELLA AG UR QL: NEGATIVE
S PNEUM AG UR QL: NEGATIVE

## 2023-11-01 PROCEDURE — 1100000001 HC PRIVATE ROOM DAILY

## 2023-11-01 PROCEDURE — 1200000002 HC GENERAL ROOM WITH TELEMETRY DAILY

## 2023-11-01 PROCEDURE — 99223 1ST HOSP IP/OBS HIGH 75: CPT | Performed by: INTERNAL MEDICINE

## 2023-11-01 PROCEDURE — 2500000001 HC RX 250 WO HCPCS SELF ADMINISTERED DRUGS (ALT 637 FOR MEDICARE OP): Performed by: STUDENT IN AN ORGANIZED HEALTH CARE EDUCATION/TRAINING PROGRAM

## 2023-11-01 PROCEDURE — 94762 N-INVAS EAR/PLS OXIMTRY CONT: CPT

## 2023-11-01 PROCEDURE — 96367 TX/PROPH/DG ADDL SEQ IV INF: CPT

## 2023-11-01 PROCEDURE — 96375 TX/PRO/DX INJ NEW DRUG ADDON: CPT

## 2023-11-01 PROCEDURE — 2500000004 HC RX 250 GENERAL PHARMACY W/ HCPCS (ALT 636 FOR OP/ED): Performed by: EMERGENCY MEDICINE

## 2023-11-01 RX ORDER — ACETAMINOPHEN 650 MG/1
650 SUPPOSITORY RECTAL EVERY 4 HOURS PRN
Status: DISCONTINUED | OUTPATIENT
Start: 2023-11-01 | End: 2023-11-04

## 2023-11-01 RX ORDER — ACETAMINOPHEN 160 MG/5ML
650 SOLUTION ORAL EVERY 4 HOURS PRN
Status: DISCONTINUED | OUTPATIENT
Start: 2023-11-01 | End: 2023-11-04

## 2023-11-01 RX ORDER — ACETAMINOPHEN 325 MG/1
650 TABLET ORAL EVERY 4 HOURS PRN
Status: DISCONTINUED | OUTPATIENT
Start: 2023-11-01 | End: 2023-11-04

## 2023-11-01 RX ORDER — ACETAMINOPHEN 325 MG/1
650 TABLET ORAL ONCE
Status: COMPLETED | OUTPATIENT
Start: 2023-11-01 | End: 2023-11-01

## 2023-11-01 RX ORDER — COLCHICINE 0.6 MG/1
0.6 TABLET ORAL 2 TIMES DAILY
Status: DISCONTINUED | OUTPATIENT
Start: 2023-11-01 | End: 2023-11-07 | Stop reason: HOSPADM

## 2023-11-01 RX ORDER — METOPROLOL TARTRATE 25 MG/1
25 TABLET, FILM COATED ORAL 2 TIMES DAILY
Status: COMPLETED | OUTPATIENT
Start: 2023-11-01 | End: 2023-11-04

## 2023-11-01 RX ORDER — PANTOPRAZOLE SODIUM 40 MG/1
40 TABLET, DELAYED RELEASE ORAL
Status: DISCONTINUED | OUTPATIENT
Start: 2023-11-02 | End: 2023-11-07 | Stop reason: HOSPADM

## 2023-11-01 RX ORDER — ONDANSETRON HYDROCHLORIDE 2 MG/ML
4 INJECTION, SOLUTION INTRAVENOUS EVERY 8 HOURS PRN
Status: DISCONTINUED | OUTPATIENT
Start: 2023-11-01 | End: 2023-11-01 | Stop reason: HOSPADM

## 2023-11-01 RX ORDER — SODIUM CHLORIDE, SODIUM LACTATE, POTASSIUM CHLORIDE, CALCIUM CHLORIDE 600; 310; 30; 20 MG/100ML; MG/100ML; MG/100ML; MG/100ML
100 INJECTION, SOLUTION INTRAVENOUS CONTINUOUS
Status: DISCONTINUED | OUTPATIENT
Start: 2023-11-01 | End: 2023-11-01 | Stop reason: HOSPADM

## 2023-11-01 RX ORDER — IBUPROFEN 600 MG/1
600 TABLET ORAL 3 TIMES DAILY
Status: DISCONTINUED | OUTPATIENT
Start: 2023-11-01 | End: 2023-11-07

## 2023-11-01 RX ADMIN — ACETAMINOPHEN 650 MG: 325 TABLET ORAL at 22:18

## 2023-11-01 RX ADMIN — ACETAMINOPHEN 650 MG: 325 TABLET ORAL at 13:13

## 2023-11-01 RX ADMIN — VANCOMYCIN HYDROCHLORIDE 1500 MG: 1.5 INJECTION, POWDER, LYOPHILIZED, FOR SOLUTION INTRAVENOUS at 16:08

## 2023-11-01 RX ADMIN — COLCHICINE 0.6 MG: 0.6 TABLET, FILM COATED ORAL at 22:41

## 2023-11-01 RX ADMIN — PIPERACILLIN SODIUM AND TAZOBACTAM SODIUM 3.38 G: 3; .375 INJECTION, SOLUTION INTRAVENOUS at 13:38

## 2023-11-01 RX ADMIN — SODIUM CHLORIDE, POTASSIUM CHLORIDE, SODIUM LACTATE AND CALCIUM CHLORIDE 100 ML/HR: 600; 310; 30; 20 INJECTION, SOLUTION INTRAVENOUS at 06:39

## 2023-11-01 RX ADMIN — METOPROLOL TARTRATE 25 MG: 25 TABLET, FILM COATED ORAL at 22:34

## 2023-11-01 RX ADMIN — ACETAMINOPHEN 650 MG: 325 TABLET ORAL at 05:39

## 2023-11-01 RX ADMIN — IBUPROFEN 600 MG: 600 TABLET ORAL at 22:34

## 2023-11-01 RX ADMIN — ONDANSETRON 4 MG: 2 INJECTION INTRAMUSCULAR; INTRAVENOUS at 00:59

## 2023-11-01 SDOH — SOCIAL STABILITY: SOCIAL INSECURITY: ARE YOU OR HAVE YOU BEEN THREATENED OR ABUSED PHYSICALLY, EMOTIONALLY, OR SEXUALLY BY ANYONE?: NO

## 2023-11-01 SDOH — SOCIAL STABILITY: SOCIAL INSECURITY: DOES ANYONE TRY TO KEEP YOU FROM HAVING/CONTACTING OTHER FRIENDS OR DOING THINGS OUTSIDE YOUR HOME?: NO

## 2023-11-01 SDOH — SOCIAL STABILITY: SOCIAL INSECURITY: ABUSE: ADULT

## 2023-11-01 SDOH — SOCIAL STABILITY: SOCIAL INSECURITY: HAVE YOU HAD THOUGHTS OF HARMING ANYONE ELSE?: NO

## 2023-11-01 SDOH — SOCIAL STABILITY: SOCIAL INSECURITY: ARE THERE ANY APPARENT SIGNS OF INJURIES/BEHAVIORS THAT COULD BE RELATED TO ABUSE/NEGLECT?: NO

## 2023-11-01 SDOH — SOCIAL STABILITY: SOCIAL INSECURITY: DO YOU FEEL ANYONE HAS EXPLOITED OR TAKEN ADVANTAGE OF YOU FINANCIALLY OR OF YOUR PERSONAL PROPERTY?: NO

## 2023-11-01 SDOH — SOCIAL STABILITY: SOCIAL INSECURITY: HAS ANYONE EVER THREATENED TO HURT YOUR FAMILY OR YOUR PETS?: NO

## 2023-11-01 SDOH — SOCIAL STABILITY: SOCIAL INSECURITY: DO YOU FEEL UNSAFE GOING BACK TO THE PLACE WHERE YOU ARE LIVING?: NO

## 2023-11-01 SDOH — SOCIAL STABILITY: SOCIAL INSECURITY: WERE YOU ABLE TO COMPLETE ALL THE BEHAVIORAL HEALTH SCREENINGS?: YES

## 2023-11-01 ASSESSMENT — COGNITIVE AND FUNCTIONAL STATUS - GENERAL
MOBILITY SCORE: 24
DAILY ACTIVITIY SCORE: 24

## 2023-11-01 ASSESSMENT — ACTIVITIES OF DAILY LIVING (ADL)
ASSISTIVE_DEVICE: EYEGLASSES
DRESSING YOURSELF: INDEPENDENT
PATIENT'S MEMORY ADEQUATE TO SAFELY COMPLETE DAILY ACTIVITIES?: YES
GROOMING: INDEPENDENT
WALKS IN HOME: INDEPENDENT
FEEDING YOURSELF: INDEPENDENT
ADEQUATE_TO_COMPLETE_ADL: YES
JUDGMENT_ADEQUATE_SAFELY_COMPLETE_DAILY_ACTIVITIES: YES
BATHING: INDEPENDENT
TOILETING: INDEPENDENT
HEARING - LEFT EAR: FUNCTIONAL
HEARING - RIGHT EAR: FUNCTIONAL

## 2023-11-01 ASSESSMENT — LIFESTYLE VARIABLES
PRESCIPTION_ABUSE_PAST_12_MONTHS: NO
EVER HAD A DRINK FIRST THING IN THE MORNING TO STEADY YOUR NERVES TO GET RID OF A HANGOVER: NO
HAVE PEOPLE ANNOYED YOU BY CRITICIZING YOUR DRINKING: NO
SUBSTANCE_ABUSE_PAST_12_MONTHS: NO
HAVE YOU EVER FELT YOU SHOULD CUT DOWN ON YOUR DRINKING: NO
EVER FELT BAD OR GUILTY ABOUT YOUR DRINKING: NO
REASON UNABLE TO ASSESS: NO

## 2023-11-01 ASSESSMENT — PAIN SCALES - GENERAL
PAINLEVEL_OUTOF10: 3
PAINLEVEL_OUTOF10: 0 - NO PAIN

## 2023-11-01 ASSESSMENT — CHA2DS2 SCORE
HYPERTENSION: NO
CHA2D2S VASC SCORE: 0
PRIOR STROKE OR TIA OR THROMBOEMBOLISM: NO
CHF OR LEFT VENTRICULAR DYSFUNCTION: NO
AGE IN YEARS: <65
VASCULAR DISEASE: NO
SEX: MALE
DIABETES: NO

## 2023-11-01 ASSESSMENT — PATIENT HEALTH QUESTIONNAIRE - PHQ9
1. LITTLE INTEREST OR PLEASURE IN DOING THINGS: NOT AT ALL
SUM OF ALL RESPONSES TO PHQ9 QUESTIONS 1 & 2: 0
2. FEELING DOWN, DEPRESSED OR HOPELESS: NOT AT ALL

## 2023-11-01 ASSESSMENT — PAIN - FUNCTIONAL ASSESSMENT: PAIN_FUNCTIONAL_ASSESSMENT: 0-10

## 2023-11-01 NOTE — PROGRESS NOTES
This progress note represents a emergency department transition note for signout of care.    Patient care was signed out to me. Please see the previous provider's notes for the full history and physical. Briefly, Jonah Britton is 33 y.o. male who presents to the emergency department for fever.  He had recent pericarditis found to have a pericardial effusion and has had several subsequent atrial fibrillation with rapid ventricular response.  Patient came back today with worsening fevers and pain.  Patient was started on antibiotics.  Patient was excepted at [facility] and currently pending bed transfer.  During my ED course, patient spiked another fever and was given Tylenol.  Otherwise, unremarkable course the patient was signed out to the daytime physician.  I did review the echocardiogram as noted below which did not seem to show overt evidence of tamponade but has not yet been officially read by a cardiologist.    Clif Mullen DO  Emergency Medicine    ED Course as of 11/01/23 2138   Tue Oct 31, 2023   2109 Contacted via the transfer center, patient was discussed with Dr. Miguel of cardiology who agrees to accept the patient in transfer, will move patient by ALS ground. [BR]   2156 Independently looked at echo - moderate sized appearing effusion, doesn't look like there's echo findings of tamponade [WJ]   Wed Nov 01, 2023   0043 Zofran ordered as needed [WJ]      ED Course User Index  [BR] Justino Cole MD  [WJ] Ted Mullen DO

## 2023-11-01 NOTE — Clinical Note
Patient Clipped and Prepped: subxiphoid process. Prepped with ChloraPrep, a minimum of 3 minute dry time, longer if needed, no pooling noted, patient draped in sterile fashion and Betadine and draped in sterile fashion.

## 2023-11-01 NOTE — Clinical Note
Patient Clipped and Prepped: apical chest. Prepped with ChloraPrep, a minimum of 3 minute dry time, longer if needed, no pooling noted, patient draped in sterile fashion and Betadine and draped in sterile fashion.

## 2023-11-01 NOTE — ED NOTES
NPO/[facility] transfer, waiting on bed  --       Nu Olson, BIANKA  11/01/23 0983

## 2023-11-01 NOTE — TELEPHONE ENCOUNTER
Late entry for care given earlier today 10/31/23.    I called the patient back re. Preliminary echo findings, which demonstrated moderate sized pericardial effusion.  He stated that he continues to have left shoulder pain and has had episodic fevers for which he has been taking tylenol.    I recommended that he come to the ED for labs and infectious disease consultation, as well as consideration for diagnostic/therapeutic pericardiocentesis.  The patient was agreeable to the above plan    Correction:  moderate-large pericardial effusion, with fibrinous stranding.  No definite echo e/o tamponade.

## 2023-11-02 ENCOUNTER — APPOINTMENT (OUTPATIENT)
Dept: CARDIOLOGY | Facility: HOSPITAL | Age: 33
DRG: 315 | End: 2023-11-02
Payer: COMMERCIAL

## 2023-11-02 PROBLEM — I31.39 PERICARDIAL EFFUSION (HHS-HCC): Status: ACTIVE | Noted: 2023-10-31

## 2023-11-02 LAB
ALBUMIN SERPL BCP-MCNC: 3.8 G/DL (ref 3.4–5)
ALP SERPL-CCNC: 69 U/L (ref 33–120)
ALT SERPL W P-5'-P-CCNC: 30 U/L (ref 10–52)
ANION GAP SERPL CALC-SCNC: 17 MMOL/L (ref 10–20)
AST SERPL W P-5'-P-CCNC: 17 U/L (ref 9–39)
BASOPHILS NFR FLD MANUAL: 0 %
BILIRUB SERPL-MCNC: 0.6 MG/DL (ref 0–1.2)
BLASTS NFR FLD MANUAL: 0 %
BUN SERPL-MCNC: 6 MG/DL (ref 6–23)
CALCIUM SERPL-MCNC: 8.9 MG/DL (ref 8.6–10.6)
CHLORIDE SERPL-SCNC: 99 MMOL/L (ref 98–107)
CLARITY FLD: ABNORMAL
CO2 SERPL-SCNC: 26 MMOL/L (ref 21–32)
COLOR FLD: ABNORMAL
CREAT SERPL-MCNC: 0.77 MG/DL (ref 0.5–1.3)
CREAT UR-MCNC: 124.6 MG/DL (ref 20–370)
EOSINOPHIL NFR FLD MANUAL: 0 %
ERYTHROCYTE [DISTWIDTH] IN BLOOD BY AUTOMATED COUNT: 12.7 % (ref 11.5–14.5)
GFR SERPL CREATININE-BSD FRML MDRD: >90 ML/MIN/1.73M*2
GLUCOSE SERPL-MCNC: 91 MG/DL (ref 74–99)
HBV SURFACE AB SER-ACNC: 132.5 MIU/ML
HBV SURFACE AG SERPL QL IA: NONREACTIVE
HCT VFR BLD AUTO: 34.8 % (ref 41–52)
HCV AB SER QL: NONREACTIVE
HGB BLD-MCNC: 11.2 G/DL (ref 13.5–17.5)
IMMATURE GRANULOCYTES IN FLUID: 0 %
LDH FLD L TO P-CCNC: 462 U/L
LYMPHOCYTES NFR FLD MANUAL: 65 %
MCH RBC QN AUTO: 28.5 PG (ref 26–34)
MCHC RBC AUTO-ENTMCNC: 32.2 G/DL (ref 32–36)
MCV RBC AUTO: 89 FL (ref 80–100)
MONOS+MACROS NFR FLD MANUAL: 13 %
NEUTROPHILS NFR FLD MANUAL: 22 %
NRBC BLD-RTO: 0 /100 WBCS (ref 0–0)
OTHER CELLS NFR FLD MANUAL: 0 %
PLASMA CELLS NFR FLD MANUAL: 0 %
PLATELET # BLD AUTO: 396 X10*3/UL (ref 150–450)
POTASSIUM SERPL-SCNC: 4.2 MMOL/L (ref 3.5–5.3)
PROT SERPL-MCNC: 6.3 G/DL (ref 6.4–8.2)
PROT UR-ACNC: 35 MG/DL (ref 5–25)
PROT/CREAT UR: 0.28 MG/MG CREAT (ref 0–0.17)
RBC # BLD AUTO: 3.93 X10*6/UL (ref 4.5–5.9)
RBC # FLD AUTO: ABNORMAL /UL
SODIUM SERPL-SCNC: 138 MMOL/L (ref 136–145)
TOTAL CELLS COUNTED FLD: 100
WBC # BLD AUTO: 13 X10*3/UL (ref 4.4–11.3)
WBC # FLD AUTO: 3096 /UL

## 2023-11-02 PROCEDURE — 99153 MOD SED SAME PHYS/QHP EA: CPT | Performed by: INTERNAL MEDICINE

## 2023-11-02 PROCEDURE — 88112 CYTOPATH CELL ENHANCE TECH: CPT | Mod: TC,MCY | Performed by: STUDENT IN AN ORGANIZED HEALTH CARE EDUCATION/TRAINING PROGRAM

## 2023-11-02 PROCEDURE — 86658 ENTEROVIRUS ANTIBODY: CPT | Performed by: NURSE PRACTITIONER

## 2023-11-02 PROCEDURE — 87206 SMEAR FLUORESCENT/ACID STAI: CPT | Performed by: STUDENT IN AN ORGANIZED HEALTH CARE EDUCATION/TRAINING PROGRAM

## 2023-11-02 PROCEDURE — 1200000002 HC GENERAL ROOM WITH TELEMETRY DAILY

## 2023-11-02 PROCEDURE — 87102 FUNGUS ISOLATION CULTURE: CPT | Performed by: NURSE PRACTITIONER

## 2023-11-02 PROCEDURE — A4217 STERILE WATER/SALINE, 500 ML: HCPCS | Performed by: STUDENT IN AN ORGANIZED HEALTH CARE EDUCATION/TRAINING PROGRAM

## 2023-11-02 PROCEDURE — 80053 COMPREHEN METABOLIC PANEL: CPT | Performed by: STUDENT IN AN ORGANIZED HEALTH CARE EDUCATION/TRAINING PROGRAM

## 2023-11-02 PROCEDURE — 86803 HEPATITIS C AB TEST: CPT | Performed by: NURSE PRACTITIONER

## 2023-11-02 PROCEDURE — 83615 LACTATE (LD) (LDH) ENZYME: CPT | Performed by: STUDENT IN AN ORGANIZED HEALTH CARE EDUCATION/TRAINING PROGRAM

## 2023-11-02 PROCEDURE — C1894 INTRO/SHEATH, NON-LASER: HCPCS | Performed by: INTERNAL MEDICINE

## 2023-11-02 PROCEDURE — 33016 PERICARDIOCENTESIS W/IMAGING: CPT | Performed by: INTERNAL MEDICINE

## 2023-11-02 PROCEDURE — 93308 TTE F-UP OR LMTD: CPT | Performed by: INTERNAL MEDICINE

## 2023-11-02 PROCEDURE — 2500000001 HC RX 250 WO HCPCS SELF ADMINISTERED DRUGS (ALT 637 FOR MEDICARE OP): Performed by: NURSE PRACTITIONER

## 2023-11-02 PROCEDURE — 2500000004 HC RX 250 GENERAL PHARMACY W/ HCPCS (ALT 636 FOR OP/ED): Performed by: INTERNAL MEDICINE

## 2023-11-02 PROCEDURE — 99222 1ST HOSP IP/OBS MODERATE 55: CPT | Performed by: INTERNAL MEDICINE

## 2023-11-02 PROCEDURE — 87116 MYCOBACTERIA CULTURE: CPT | Performed by: NURSE PRACTITIONER

## 2023-11-02 PROCEDURE — 93308 TTE F-UP OR LMTD: CPT

## 2023-11-02 PROCEDURE — 87081 CULTURE SCREEN ONLY: CPT

## 2023-11-02 PROCEDURE — 2720000007 HC OR 272 NO HCPCS: Performed by: INTERNAL MEDICINE

## 2023-11-02 PROCEDURE — 99233 SBSQ HOSP IP/OBS HIGH 50: CPT | Performed by: INTERNAL MEDICINE

## 2023-11-02 PROCEDURE — 0W9D30Z DRAINAGE OF PERICARDIAL CAVITY WITH DRAINAGE DEVICE, PERCUTANEOUS APPROACH: ICD-10-PCS | Performed by: STUDENT IN AN ORGANIZED HEALTH CARE EDUCATION/TRAINING PROGRAM

## 2023-11-02 PROCEDURE — 87486 CHLMYD PNEUM DNA AMP PROBE: CPT | Performed by: NURSE PRACTITIONER

## 2023-11-02 PROCEDURE — 85027 COMPLETE CBC AUTOMATED: CPT | Performed by: STUDENT IN AN ORGANIZED HEALTH CARE EDUCATION/TRAINING PROGRAM

## 2023-11-02 PROCEDURE — 89051 BODY FLUID CELL COUNT: CPT | Performed by: STUDENT IN AN ORGANIZED HEALTH CARE EDUCATION/TRAINING PROGRAM

## 2023-11-02 PROCEDURE — 84156 ASSAY OF PROTEIN URINE: CPT | Performed by: NURSE PRACTITIONER

## 2023-11-02 PROCEDURE — 87632 RESP VIRUS 6-11 TARGETS: CPT | Performed by: NURSE PRACTITIONER

## 2023-11-02 PROCEDURE — 86706 HEP B SURFACE ANTIBODY: CPT | Performed by: NURSE PRACTITIONER

## 2023-11-02 PROCEDURE — 2500000005 HC RX 250 GENERAL PHARMACY W/O HCPCS: Performed by: INTERNAL MEDICINE

## 2023-11-02 PROCEDURE — 88112 CYTOPATH CELL ENHANCE TECH: CPT | Performed by: PATHOLOGY

## 2023-11-02 PROCEDURE — 99152 MOD SED SAME PHYS/QHP 5/>YRS: CPT | Performed by: INTERNAL MEDICINE

## 2023-11-02 PROCEDURE — 36415 COLL VENOUS BLD VENIPUNCTURE: CPT | Performed by: NURSE PRACTITIONER

## 2023-11-02 PROCEDURE — 87536 HIV-1 QUANT&REVRSE TRNSCRPJ: CPT | Performed by: NURSE PRACTITIONER

## 2023-11-02 PROCEDURE — C1769 GUIDE WIRE: HCPCS | Performed by: INTERNAL MEDICINE

## 2023-11-02 PROCEDURE — 2500000004 HC RX 250 GENERAL PHARMACY W/ HCPCS (ALT 636 FOR OP/ED): Performed by: NURSE PRACTITIONER

## 2023-11-02 PROCEDURE — 87385 HISTOPLASMA CAPSUL AG IA: CPT | Performed by: NURSE PRACTITIONER

## 2023-11-02 PROCEDURE — 87075 CULTR BACTERIA EXCEPT BLOOD: CPT | Performed by: STUDENT IN AN ORGANIZED HEALTH CARE EDUCATION/TRAINING PROGRAM

## 2023-11-02 PROCEDURE — C1729 CATH, DRAINAGE: HCPCS | Performed by: INTERNAL MEDICINE

## 2023-11-02 PROCEDURE — 2500000001 HC RX 250 WO HCPCS SELF ADMINISTERED DRUGS (ALT 637 FOR MEDICARE OP): Performed by: STUDENT IN AN ORGANIZED HEALTH CARE EDUCATION/TRAINING PROGRAM

## 2023-11-02 PROCEDURE — 2500000004 HC RX 250 GENERAL PHARMACY W/ HCPCS (ALT 636 FOR OP/ED): Performed by: STUDENT IN AN ORGANIZED HEALTH CARE EDUCATION/TRAINING PROGRAM

## 2023-11-02 PROCEDURE — 36415 COLL VENOUS BLD VENIPUNCTURE: CPT | Performed by: STUDENT IN AN ORGANIZED HEALTH CARE EDUCATION/TRAINING PROGRAM

## 2023-11-02 PROCEDURE — 87340 HEPATITIS B SURFACE AG IA: CPT | Performed by: NURSE PRACTITIONER

## 2023-11-02 RX ORDER — SODIUM CHLORIDE 9 MG/ML
INJECTION, SOLUTION INTRAVENOUS CONTINUOUS PRN
Status: COMPLETED | OUTPATIENT
Start: 2023-11-02 | End: 2023-11-02

## 2023-11-02 RX ORDER — LIDOCAINE HYDROCHLORIDE 20 MG/ML
INJECTION, SOLUTION INFILTRATION; PERINEURAL AS NEEDED
Status: DISCONTINUED | OUTPATIENT
Start: 2023-11-02 | End: 2023-11-02 | Stop reason: HOSPADM

## 2023-11-02 RX ORDER — CEFAZOLIN SODIUM 2 G/100ML
2 INJECTION, SOLUTION INTRAVENOUS EVERY 8 HOURS
Status: COMPLETED | OUTPATIENT
Start: 2023-11-02 | End: 2023-11-03

## 2023-11-02 RX ORDER — CEFAZOLIN SODIUM 2 G/50ML
SOLUTION INTRAVENOUS CONTINUOUS PRN
Status: COMPLETED | OUTPATIENT
Start: 2023-11-02 | End: 2023-11-02

## 2023-11-02 RX ORDER — OXYCODONE AND ACETAMINOPHEN 5; 325 MG/1; MG/1
1 TABLET ORAL EVERY 6 HOURS PRN
Status: DISCONTINUED | OUTPATIENT
Start: 2023-11-02 | End: 2023-11-04

## 2023-11-02 RX ORDER — MIDAZOLAM HYDROCHLORIDE 1 MG/ML
INJECTION INTRAMUSCULAR; INTRAVENOUS AS NEEDED
Status: DISCONTINUED | OUTPATIENT
Start: 2023-11-02 | End: 2023-11-02 | Stop reason: HOSPADM

## 2023-11-02 RX ORDER — FENTANYL CITRATE 50 UG/ML
INJECTION, SOLUTION INTRAMUSCULAR; INTRAVENOUS AS NEEDED
Status: DISCONTINUED | OUTPATIENT
Start: 2023-11-02 | End: 2023-11-02 | Stop reason: HOSPADM

## 2023-11-02 RX ADMIN — VANCOMYCIN HYDROCHLORIDE 1500 MG: 5 INJECTION, POWDER, LYOPHILIZED, FOR SOLUTION INTRAVENOUS at 08:37

## 2023-11-02 RX ADMIN — METOPROLOL TARTRATE 25 MG: 25 TABLET, FILM COATED ORAL at 20:57

## 2023-11-02 RX ADMIN — PIPERACILLIN SODIUM AND TAZOBACTAM SODIUM 3.38 G: 3; .375 INJECTION, SOLUTION INTRAVENOUS at 00:47

## 2023-11-02 RX ADMIN — CEFAZOLIN SODIUM 2 G: 2 INJECTION, SOLUTION INTRAVENOUS at 23:00

## 2023-11-02 RX ADMIN — METOPROLOL TARTRATE 25 MG: 25 TABLET, FILM COATED ORAL at 08:37

## 2023-11-02 RX ADMIN — ACETAMINOPHEN 650 MG: 325 TABLET ORAL at 19:44

## 2023-11-02 RX ADMIN — ACETAMINOPHEN 650 MG: 325 TABLET ORAL at 05:10

## 2023-11-02 RX ADMIN — PIPERACILLIN SODIUM AND TAZOBACTAM SODIUM 3.38 G: 3; .375 INJECTION, SOLUTION INTRAVENOUS at 06:35

## 2023-11-02 RX ADMIN — IBUPROFEN 600 MG: 600 TABLET ORAL at 08:37

## 2023-11-02 RX ADMIN — IBUPROFEN 600 MG: 600 TABLET ORAL at 20:57

## 2023-11-02 RX ADMIN — COLCHICINE 0.6 MG: 0.6 TABLET, FILM COATED ORAL at 08:36

## 2023-11-02 RX ADMIN — COLCHICINE 0.6 MG: 0.6 TABLET, FILM COATED ORAL at 20:57

## 2023-11-02 RX ADMIN — IBUPROFEN 600 MG: 600 TABLET ORAL at 16:32

## 2023-11-02 RX ADMIN — ACETAMINOPHEN 650 MG: 325 TABLET ORAL at 09:25

## 2023-11-02 RX ADMIN — PANTOPRAZOLE SODIUM 40 MG: 40 TABLET, DELAYED RELEASE ORAL at 06:35

## 2023-11-02 RX ADMIN — OXYCODONE HYDROCHLORIDE AND ACETAMINOPHEN 1 TABLET: 5; 325 TABLET ORAL at 23:00

## 2023-11-02 ASSESSMENT — ENCOUNTER SYMPTOMS
CONSTIPATION: 0
APPETITE CHANGE: 1
DIAPHORESIS: 1
VOMITING: 0
CHEST TIGHTNESS: 1
DIARRHEA: 0
WEAKNESS: 0
ABDOMINAL PAIN: 0
SLEEP DISTURBANCE: 1
FATIGUE: 1
SHORTNESS OF BREATH: 1
SORE THROAT: 0
CHILLS: 1
DYSURIA: 0
NAUSEA: 0
HEADACHES: 0
RHINORRHEA: 0

## 2023-11-02 ASSESSMENT — PAIN SCALES - GENERAL
PAINLEVEL_OUTOF10: 0 - NO PAIN
PAINLEVEL_OUTOF10: 7
PAINLEVEL_OUTOF10: 0 - NO PAIN

## 2023-11-02 ASSESSMENT — COGNITIVE AND FUNCTIONAL STATUS - GENERAL
MOBILITY SCORE: 24
DAILY ACTIVITIY SCORE: 24

## 2023-11-02 ASSESSMENT — PAIN - FUNCTIONAL ASSESSMENT: PAIN_FUNCTIONAL_ASSESSMENT: 0-10

## 2023-11-02 NOTE — H&P
"History Of Present Illness:    Jonah Britton is a 33 y.o. male presenting with pericarditis with chest pain, fever.    Patient is a 33-year-old gentleman with a history of pericarditis that was discharged on 10/26.  Mainly his symptoms started in early October with a \"chest cold\" associated with SOB and feeling fatigued. He felt transiently better but then worse again culminating to admission on discharge on 10/26 where he was dx with pericarditis. He was given ibuprofen/colchicine/metoprolol.  At the time he had atrial fibrillation as well.    After that discharge, he returned to the hospital due to 2 to 3 days of worsening pain over the left lateral chest and back.  He also had fevers and trouble sleeping at night as well as constitutional symptoms. No weight loss otherwise.    He had serial echocardiograms that were done recently.  On 10/30 his most recent echo showed a moderate to large pericardial effusion without any evidence of cardiac tamponade.  His EF is 50%.    His troponin is negative but he had an elevated D-dimer to 664.  CTA PE was performed which did not show pulmonary embolism.  He was also negative for flu, coronavirus, and his urine antigen for strep was negative, and Legionella urine antigen was negative.  Labs also noted for leukocytosis of 13.3.  While he was at outside hospital ED he was started on broad-spectrum antibiotics including doxycycline, vancomycin, Zosyn.  He was also given Tylenol/Toradol and fluids with improvement of his symptoms.  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         Last Recorded Vitals:  Vitals:    11/01/23 2123   BP: 151/76   Pulse: 99   Resp: 18   Temp: 37.4 °C " (99.3 °F)   TempSrc: Temporal   SpO2: 96%   Weight: 106 kg (234 lb 2.1 oz)       Last Labs:  CBC - 10/31/2023:  5:50 PM  13.3 11.3 362    34.2      CMP - 10/31/2023:  5:50 PM  9.2 7.2 16 --- 0.6   3.4 4.2 27 61      PTT - No results in last year.  _   _ _     Troponin I, High Sensitivity   Date/Time Value Ref Range Status   10/31/2023 05:50 PM <3 0 - 20 ng/L Final   10/24/2023 08:04 PM 55 (HH) 0 - 20 ng/L Final     Comment:     Previous result verified on 10/24/2023 1138 on specimen/case [id number] called with component [facility] for procedure Troponin I, High Sensitivity with value 84 ng/L.   10/24/2023 04:22 PM 46 (H) 0 - 20 ng/L Final     LDL Calculated   Date/Time Value Ref Range Status   10/24/2023 04:22 PM 86 <=99 mg/dL Final     Comment:                                 Near   Borderline      AGE      Desirable  Optimal    High     High     Very High     0-19 Y     0 - 109     ---    110-129   >/= 130     ----    20-24 Y     0 - 119     ---    120-159   >/= 160     ----      >24 Y     0 -  99   100-129  130-159   160-189     >/=190       VLDL   Date/Time Value Ref Range Status   10/24/2023 04:22 PM 15 0 - 40 mg/dL Final      Last I/O:  No intake/output data recorded.  .      Past Medical History:  He has no past medical history on file.    Past Surgical History:  He has no past surgical history on file.      Social History:  He reports that he quit smoking about 4 years ago. His smoking use included cigarettes. He has never used smokeless tobacco. No history on file for alcohol use and drug use.    Family History:  No family history on file.     Allergies:  Patient has no known allergies.    Inpatient Medications:  Scheduled medications   Medication Dose Route Frequency    colchicine (gout)  0.6 mg oral BID    ibuprofen  600 mg oral TID    metoprolol tartrate  25 mg oral BID    [START ON 11/2/2023] pantoprazole  40 mg oral Daily before breakfast    piperacillin-tazobactam  3.375 g intravenous q8h    [START ON  11/2/2023] vancomycin  1,500 mg intravenous q12h     PRN medications   Medication    acetaminophen    Or    acetaminophen    Or    acetaminophen     Continuous Medications   Medication Dose Last Rate     Outpatient Medications:  Current Outpatient Medications   Medication Instructions    colchicine (gout) 0.6 mg, oral, 2 times daily    ibuprofen 600 mg, oral, 3 times daily    metoprolol tartrate (LOPRESSOR) 25 mg, oral, 2 times daily    pantoprazole (PROTONIX) 40 mg, oral, Daily before breakfast, Do not crush, chew, or split.       Physical Exam:  GEN: NAD, face appears flushed  HEENT: ATNC,  anicteric, no JVD  CV: RRR, no r/g/m  Pulm: CTAB  Abdomen: NTND  Ext: warm, no LE edema noted  Neuro: A+Ox3  Psych: appropriate     Transthoracic echo (TTE) limited    Result Date: 11/1/2023              [place] [address]      Phone [phone] Fax [phone] TRANSTHORACIC ECHOCARDIOGRAM REPORT  Patient Name:      SHELBY AGUILAR       Reading Physician:    [id number] Neal Grant MD Study Date:        10/31/2023           Ordering Provider:    [id number] JAQUELINE SHEPPARD MRN/PID:           [id number]             Fellow: Accession#:        [id number]         Nurse: Date of Birth/Age: 1990 / 33      Sonographer:          Rossana Miller RDCS                    years Gender:            M                    Additional Staff: Height:            195.58 cm            Admit Date: Weight:            106.14 kg            Admission Status:     Outpatient BSA:               2.39 m2              Department Location:  [facility]                                                               Lab Blood Pressure: 131 /72 mmHg Study Type:    TRANSTHORACIC ECHO (TTE) LIMITED Diagnosis/ICD: Other pericardial effusion (noninflammatory)-I31.39 Indication:    Pericardial  Effusion CPT Codes:     Echo Limited-80509; Doppler Limited-30769; Color Doppler-17353 Patient History: Pertinent History: Pericardial eff and A-Fib. Study Detail: The following Echo studies were performed: 2D, M-Mode, Doppler and               color flow.  PHYSICIAN INTERPRETATION: Left Ventricle: Left ventricular systolic function is low normal, with an estimated ejection fraction of 50%. There are no regional wall motion abnormalities. The left ventricular cavity size is normal. Left ventricular diastolic filling was not assessed. Left Atrium: The left atrium was not assessed. Right Ventricle: The right ventricle was not assessed. Right ventricular systolic function not assessed. Right Atrium: The right atrium was not assessed. Aortic Valve: The aortic valve was not assessed. Aortic valve regurgitation was not assessed. Mitral Valve: The mitral valve was not assessed. Mitral valve regurgitation was not assessed. Tricuspid Valve: The tricuspid valve was not assessed. Tricuspid regurgitation was not assessed. Pulmonic Valve: The pulmonic valve was not assessed. The pulmonic valve regurgitation was not assessed. Pericardium: There is a moderate to large pericardial effusion. The effusion is circumferential. The pericardial effusion appears to contain focal strands and appears to contain fibrinous material. There is no evidence of cardiac tamponade. There is inferior vena caval plethora and >25% respiratory variation across TV inflow. Dilated IVC without collapse. Aorta: The aortic root was not assessed. Systemic Veins: The inferior vena cava appears dilated. In comparison to the previous echocardiogram(s): The pericardial effusion is unchanged as compared to prior study on 10/25/2023.  CONCLUSIONS:  1. Left ventricular systolic function is low normal with a 50% estimated ejection fraction.  2. Moderate to large pericardial effusion.  3. There is no evidence of cardiac tamponade. QUANTITATIVE DATA SUMMARY: 2D  MEASUREMENTS:                          Normal Ranges: IVSd:          1.27 cm   (0.6-1.1cm) LVPWd:         1.11 cm   (0.6-1.1cm) LVIDd:         4.89 cm   (3.9-5.9cm) LV Mass Index: 92.9 g/m2 LV SYSTOLIC FUNCTION BY 2D PLANIMETRY (MOD):                     Normal Ranges: EF-A4C View: 42.2 % (>=55%) EF-A2C View: 41.8 % EF-Biplane:  43.8 % TRICUSPID VALVE/RVSP:                   Normal Ranges: IVC Diam: 2.45 cm  [id number] Neal Grant MD Electronically signed on 11/1/2023 at 12:10:41 PM  ** Final **     CT angio chest for pulmonary embolism    Result Date: 10/31/2023  Interpreted By:  Parker Jones, STUDY: CT ANGIO CHEST FOR PULMONARY EMBOLISM;  10/31/2023 8:39 pm   INDICATION: Signs/Symptoms:Chest pain.   COMPARISON: Chest radiography from 10/31/2023.   ACCESSION NUMBER(S): [id number]   ORDERING CLINICIAN: RAKESH MEADE   TECHNIQUE: Helical data acquisition of the chest was obtained after intravenous administration of 50 ccOmnipaque 350, as per PE protocol. Images were reformatted in coronal and sagittal planes. Axial and coronal maximum intensity projection (MIP) images were created and reviewed.   FINDINGS: POTENTIAL LIMITATIONS OF THE STUDY: None   HEART AND VESSELS: There are no discrete filling defects within main pulmonary artery and its branches to suggest acute pulmonary embolism. Main pulmonary artery and its branches are normal in caliber.   The thoracic aorta normal in course and caliber. No coronary artery calcifications are seen. Please note, the study is not optimized for evaluation of coronary arteries.   The cardiac chambers are not enlarged.   There is large pericardial effusion present.Correlate clinically with concern for cardiac tamponade.   MEDIASTINUM AND JESSICA, LOWER NECK AND AXILLA: The visualized thyroid gland is within normal limits. No evidence of thoracic lymphadenopathy by CT criteria. Esophagus appears within normal limits as seen.   LUNGS AND AIRWAYS: The trachea and central airways are  patent. No endobronchial lesion is seen.   Posterior bibasilar airspace opacities may relate to atelectasis or pneumonia. Otherwise, lungs are grossly clear. Small pleural effusions, slightly larger on the right. No pneumothorax.     UPPER ABDOMEN: The visualized subdiaphragmatic structures demonstrate no remarkable findings.       CHEST WALL AND OSSEOUS STRUCTURES: Chest wall is within normal limits. No acute osseous pathology.There are no suspicious osseous lesions.       No evidence of acute pulmonary embolism.   Posterior bibasilar airspace opacities may relate to atelectasis or pneumonia. Otherwise, lungs are grossly clear. Small pleural effusions, slightly larger on the right. No pneumothorax.   There is large pericardial effusion present.Correlate clinically with concern for cardiac tamponade.   Additional findings as discussed above.   MACRO: None   Signed by: Parker Jones 10/31/2023 9:12 PM Dictation workstation:   [id number]    XR chest 2 views    Result Date: 10/31/2023  Interpreted By:  Rogelio Elizabeth, STUDY: XR CHEST 2 VIEWS   INDICATION: Signs/Symptoms:Fever, recent hospitalization.   COMPARISON: October 24, 2023   ACCESSION NUMBER(S): [id number]   ORDERING CLINICIAN: RAKESH MEADE   FINDINGS: Cardiomegaly with interval development of mild basilar interstitial prominence and small effusions suggesting CHF.   Component of superimposed pneumonia particularly at the left lung base not excluded.   No evidence of pneumothorax.       Cardiomegaly with interval development of mild basilar interstitial prominence and small effusions suggesting CHF.   Component of superimposed pneumonia particularly at the left lung base not excluded.   Signed by: Rogelio Elizabeth 10/31/2023 6:31 PM Dictation workstation:   [id number]        Assessment/Plan     Pt is a 32 yo previously healthy gentleman who developed a cold-like illness in October of 2023 c/b pericarditis. He is admitted a second time due to fevers, night sweats, and  chest discomfort.    Clinically suspect his symptoms are related to pericarditis that is ongoing  viral panel negative thus far, CTA PE without e/o of pulmonary embolism.  hemodynamically stable without tamponade at this time  Low s/f myocardiditis as recent HS trponin is <3.    #Pericarditis, continued episode  -colchicine 0.6 BID  -ibuprofen 600 TID  -metop tartrate 25 BID  -protonix for GI protection  -stop doxycycline as his legionella and strep was negative; unlikely an atypical pneumonia  -may need to consider steroids should pain recur, balanced with risk of recurrence; beyond that may consider anakinra  -NPO midnight for consideration of therapeutic pericardiocentesis    #paroxysmal atrial fibrillation in setting of pericarditis  -continue metop tartrate  -obtain ecg- likely in sinus on exam      Code status: peresumed full  Dispo: inpt  DVT PPX: low score, ambulation  Diet: regular, NPO midnight  Lines/tubes: IVs    Pt to be staffed with Dr. Richie Patterson in the AM        Agustin Quintero MD

## 2023-11-02 NOTE — CONSULTS
"Inpatient consult to Infectious Diseases  Consult performed by: [facility]  Consult ordered by: Nubia Washington, APRN-CNP        Referred by Carmen Freeman MD    Primary MD: No Assigned PCP Generic Provider, MD    Reason For Consult  pericarditis    History Of Present Illness  Jonah Britton is a 33 y.o. male presenting with pericarditis with a moderate-large pericardial effusion. The patient was previously admitted and diagnosed with pericarditis with a pericardial effusion following a \"chest cold\" with SOB and fatigue. He was discharged on 10/26 on ibuprofen, colchicine, and metoprolol. He returned to OSH on 10/31 in the setting of fever, worsening left sided chest discomfort. His last echo on 10/30 showed a moderate to large pericardial effusion without evidence of cardiac tamponade.    He was started on vancomycin and zosyn at the outside hospital for concern for bacteremia, in addition to doxycycline in case of atypical pneumonia in the setting of questionable left lower lobe infiltrate. The doxycyline was discontinued here in the setting of negative legionella and strep, making atypical pneumonia unlikely. He was also found to be negative for flu and covid. His blood cultures from 10/31 remain negative. Urinalysis was negative.    On admission, his WBC was 13.3. He has had a Tmax of 38.8.    The patient reports having intermittent fevers when he is not feeling well. For example, last night from 3am-9:30am today he was feeling febrile with chills and sweats and decreased appetite. He endorses chest pain with deep inspiration without SOB. He denies bowel or bladder changes. He says that when he does not have the fevers he feels ok. He denies recent travel and does not have children.    He is due for a pericardiocentesis today.      Past Medical History  He has no past medical history on file.    Surgical History  He has no past surgical history on file.     Social History     Occupational History    Not on " file   Tobacco Use    Smoking status: Former     Types: Cigarettes     Quit date: 10/1/2019     Years since quittin.0    Smokeless tobacco: Never   Substance and Sexual Activity    Alcohol use: Not on file    Drug use: Not on file    Sexual activity: Not on file     Travel History   Travel since 10/02/23    No documented travel since 10/02/23            Pets: unknown  Hobbies: unknown    Family History  No family history on file.  Allergies  Patient has no known allergies.     Immunization History   Administered Date(s) Administered    Pfizer Purple Cap SARS-CoV-2 10/06/2021, 10/28/2021     Medications  Home medications:  Medications Prior to Admission   Medication Sig Dispense Refill Last Dose    colchicine, gout, 0.6 mg tablet Take 1 tablet (0.6 mg) by mouth 2 times a day. 60 tablet 0     ibuprofen 600 mg tablet Take 1 tablet (600 mg) by mouth 3 times a day. 90 tablet 0     metoprolol tartrate (Lopressor) 25 mg tablet Take 1 tablet (25 mg) by mouth 2 times a day. 60 tablet 0     pantoprazole (ProtoNix) 40 mg EC tablet Take 1 tablet (40 mg) by mouth once daily in the morning. Take before meals. Do not crush, chew, or split. Do not start before 2023. 30 tablet 0      Current medications:  Scheduled medications  colchicine (gout), 0.6 mg, oral, BID  ibuprofen, 600 mg, oral, TID  metoprolol tartrate, 25 mg, oral, BID  pantoprazole, 40 mg, oral, Daily before breakfast  piperacillin-tazobactam, 3.375 g, intravenous, q6h  vancomycin, 1,500 mg, intravenous, q12h      Continuous medications  ceFAZolin, , Last Rate: 2 g (23 1505)  sodium chloride 0.9%, , Last Rate: 50 mL/hr (23 1430)      PRN medications  PRN medications: acetaminophen **OR** acetaminophen **OR** acetaminophen, ceFAZolin, fentaNYL PF, lidocaine, midazolam, sodium chloride 0.9%    Review of Systems   Constitutional:  Positive for appetite change, chills, diaphoresis and fatigue.   HENT:  Negative for rhinorrhea and sore throat.   "  Respiratory:  Positive for chest tightness and shortness of breath.    Cardiovascular:  Positive for chest pain.   Gastrointestinal:  Negative for abdominal pain, constipation, diarrhea, nausea and vomiting.   Genitourinary:  Negative for dysuria and urgency.   Skin:  Negative for rash.   Neurological:  Negative for weakness and headaches.   Psychiatric/Behavioral:  Positive for sleep disturbance.         Objective  Range of Vitals (last 24 hours)  Heart Rate:  []   Temp:  [36.5 °C (97.7 °F)-38.1 °C (100.6 °F)]   Resp:  [13-20]   BP: (120-152)/(65-92)   Height:  [195.6 cm (6' 5.01\")]   Weight:  [106 kg (234 lb 2.1 oz)]   SpO2:  [93 %-100 %]   Daily Weight  11/02/23 : 106 kg (234 lb 2.1 oz)    Body mass index is 27.76 kg/m².     Physical Exam  Constitutional:       Appearance: He is normal weight.   HENT:      Head: Normocephalic.      Nose: Nose normal.      Mouth/Throat:      Mouth: Mucous membranes are moist.      Pharynx: Oropharynx is clear.   Eyes:      Pupils: Pupils are equal, round, and reactive to light.   Cardiovascular:      Rate and Rhythm: Normal rate and regular rhythm.      Heart sounds: Normal heart sounds.   Pulmonary:      Effort: Pulmonary effort is normal.      Breath sounds: Normal breath sounds.   Abdominal:      General: Abdomen is flat.      Palpations: Abdomen is soft.      Tenderness: There is no abdominal tenderness. There is no guarding.   Musculoskeletal:         General: Normal range of motion.      Cervical back: Normal range of motion.   Skin:     General: Skin is warm and dry.   Neurological:      General: No focal deficit present.      Mental Status: He is alert.   Psychiatric:         Mood and Affect: Mood normal.         Behavior: Behavior normal.          Relevant Results  Outside Hospital Results  No  Labs  Results from last 72 hours   Lab Units 11/02/23  1110 10/31/23  1750   WBC AUTO x10*3/uL 13.0* 13.3*   HEMOGLOBIN g/dL 11.2* 11.3*   HEMATOCRIT % 34.8* 34.2* " "  PLATELETS AUTO x10*3/uL 396 362   NEUTROS PCT AUTO %  --  79.4   LYMPHS PCT AUTO %  --  10.1   MONOS PCT AUTO %  --  8.1   EOS PCT AUTO %  --  1.6     Results from last 72 hours   Lab Units 10/31/23  1750   SODIUM mmol/L 137   POTASSIUM mmol/L 3.6   CHLORIDE mmol/L 100   CO2 mmol/L 27   BUN mg/dL 10   CREATININE mg/dL 0.86   GLUCOSE mg/dL 91   CALCIUM mg/dL 9.2   ANION GAP mmol/L 14   EGFR mL/min/1.73m*2 >90     Results from last 72 hours   Lab Units 10/31/23  1750   ALK PHOS U/L 61   BILIRUBIN TOTAL mg/dL 0.6   PROTEIN TOTAL g/dL 7.2   ALT U/L 27   AST U/L 16   ALBUMIN g/dL 4.2     Estimated Creatinine Clearance: 125 mL/min (by C-G formula based on SCr of 0.86 mg/dL).  C-Reactive Protein   Date Value Ref Range Status   10/25/2023 7.76 (H) <1.00 mg/dL Final     No results found for: \"HIV1X2\", \"HIVCONF\", \"FRWOIC0LV\"  No results found for: \"HEPCABINIT\", \"HEPCAB\", \"HCVPCRQUANT\"  Microbiology    Blood cultures from 10/31 NGTD.     Imaging    No results found for this or any previous visit.       Assessment/Plan   Jonah Britton is a 33 y.o. male presenting with pericarditis with a moderate-large pericardial effusion. He was previously discharged with pericarditis on 10/26 and has returned, reporting repeated fevers.    The differential for pericarditis is broad and can be both infectious and noninfectious including leukemia lymphoma. The patient's history presenting first with a cold is suggestive of a possible viral source. He does not have children who can often serve as the vector for infection. He does not have risk factors for a bacterial source, such as not having a CABG. He has a mild atelectasis so the pericarditis unlikely to have been caused from any pulmonary pathology. Thus, antibiotics can be discontinued. A rheumatologic autoimmmune source should also be considered. While he is here, we will do a workup for his fevers and to look for a possible infectious source.    He is due for pericardiocentesis " today, with accompanying cell count and diff, bacterial gram stain and culture, fungal stain and culture and AFB cultures.    Recommendations  - Discontinue antibiotics  - With morning labs, please get a CBC with differential  - Follow up pericardiocentesis fluid for bacterial cultures and fungal cultures and AFB cultures and cytology.  - Please obtain the following additional tests         - nasopharyngeal swab for respiratory viral panel         - nasopharyngeal swab for atypical pneumonia panel         - HIV screen         - hepatitis B and C serology         - coxsackie serology         - parvovirus PCR         - Urine and blood histoplasma  - Please consider rheumatology consult for autoimmune source    Patient and recommendations have been discussed with Dr. Dillon. Recommendations are not final until note has been cosigned by an attending.    [facility]    33-year-old male patient with no significant medical problems and recent hospital admission for pericarditis and atrial fibrillation presented to the hospital for worsening chest pain and fever.  Patient was recently diagnosed with pericarditis and discharged on oral colchicine and naproxen.    Patient was in his usual state of health until a month ago when he had chest cold.  Patient took over-the-counter medication with which symptoms resolved.  A week ago patient was having chest pain and palpitations and so presented to emergency room.  Patient is diagnosed with pericardial effusion and started on colchicine and naproxen.  After going home patient continued to have fever.  Patient feels extremely weak during these episodes of fever but once fever resolves he feels much better.    Patient denies high risk behavior including illicit drug use.  Denies history of skin rash or recurrent skin infections.  Denies recent invasive procedures.  No travel or contact with sick people.    Assessment  Pericardial effusion and fever    Plan  I had an extensive  discussion with patient and his family members who are at bedside regarding etiology of pericardial effusion which is extensive includes infectious and noninfectious etiology.  Infectious etiology is mostly viral.  Patient does not has any risk factors to suspect bacterial pericarditis.  I informed the family that despite extensive work-up most of the time we will not know the exact etiology.    CT chest shows minimal left basilar atelectasis and so no need for antibiotics.    Follow-up on blood cultures.    Please send pericardial fluid for cell count with differential, Gram stain and culture, fungal stain and culture, AFB stain and culture and cytology.    Please check HIV antigen antibody screen, acute hepatitis panel, coxsackie serology, parvovirus PCR, nasopharyngeal swab for complete respiratory viral panel and atypical pneumonia panel and urine and blood histoplasma antigen.    Please check SUE and rheumatoid factor.     I saw and evaluated the patient. I personally obtained the key and critical portions of the history and physical exam or was physically present for key and critical portions performed by the resident/fellow. I reviewed the resident/fellow's documentation and discussed the patient with the resident/fellow. I agree with the resident/fellow's medical decision making as documented in the note.

## 2023-11-02 NOTE — ED NOTES
Pharmacy Medication History Review    Jonah Britton is a 33 y.o. male admitted for Pericarditis associated with atrial septal defect. Pharmacy reviewed the patient's [id number] medications and allergies for accuracy.    The list below reflectives the updated PTA list. Please review each medication in order reconciliation for additional clarification and justification.  Medications Prior to Admission   Medication Sig Dispense Refill Last Dose    colchicine, gout, 0.6 mg tablet Take 1 tablet (0.6 mg) by mouth 2 times a day. 60 tablet 0     ibuprofen 600 mg tablet Take 1 tablet (600 mg) by mouth 3 times a day. 90 tablet 0     metoprolol tartrate (Lopressor) 25 mg tablet Take 1 tablet (25 mg) by mouth 2 times a day. 60 tablet 0     pantoprazole (ProtoNix) 40 mg EC tablet Take 1 tablet (40 mg) by mouth once daily in the morning. Take before meals. Do not crush, chew, or split. Do not start before October 27, 2023. 30 tablet 0         The list below reflectives the updated allergy list. Please review each documented allergy for additional clarification and justification.  Allergies  Reviewed by Inés Mahajan RN on 11/2/2023   No Known Allergies     This information was gathered from outpatient dispense history, OARRS, patient interview. Patient was a good historian explaining how they took every medication.    Below are additional concerns with the patient's PTA list.  None to note    Brock Colmenares PharmD  [facility] PGY1 Pharmacy Resident   [facility]  Please reach out via [email or web address] Secure Chat for questions, or if no response call [employer] or [employer] “[facility]”

## 2023-11-02 NOTE — HOSPITAL COURSE
"Jonah Britton is a 33 y.o. male presenting with pericarditis with chest pain, fever.     Patient is a 33-year-old gentleman with a history of pericarditis that was discharged on 10/26.  Mainly his symptoms started in early October with a \"chest cold\" associated with SOB and feeling fatigued. He felt transiently better but then worse again culminating to admission on discharge on 10/26 where he was dx with pericarditis. He was given ibuprofen/colchicine/metoprolol.  At the time he had atrial fibrillation as well.     After that discharge, he returned to the hospital due to 2 to 3 days of worsening pain over the left lateral chest and back.  He also had fevers and trouble sleeping at night as well as constitutional symptoms. No weight loss otherwise.     He had serial echocardiograms that were done recently.  On 10/30 his most recent echo showed a moderate to large pericardial effusion without any evidence of cardiac tamponade.  His EF is 50%.     His troponin is negative but he had an elevated D-dimer to 664.  CTA PE was performed which did not show pulmonary embolism.  He was also negative for flu, coronavirus, and his urine antigen for strep was negative, and Legionella urine antigen was negative.  Labs also noted for leukocytosis of 13.3.  While he was at outside hospital ED he was started on broad-spectrum antibiotics including doxycycline, vancomycin, Zosyn.  He was also given Tylenol/Toradol and fluids with improvement of his symptoms.  He was then transferred to [facility] for further management.    Floor Course  Patient underwent pericardiocentesis 11/2 with 350cc removed. A drain remained in place, due to decreased output was removed 11/5. Limited echo 11/4 showed EF 55%, moderate pericardial effusion and moderate pleural effusion, no evidence of cardiac tamponade. The pleural effusion was loculated and septated and therefore could not be drained further. cMRI completed 11/6 suggestive of effusive constrictive " pericarditis, results discussed with Dr. Saenz. Echo repeated on day of discharge, effusion unchanged and limited evidence for constriction. Plan to repeat echo in 1-2 weeks.     ID consulted and cytology sent showing no evidence of malignancy or infection. Patient had leukocytosis with WBC 13,000 and intermittent fevers (expected with pericarditis). Doxycycline stopped since legionella and strep negative. Blood cultures negative. Infectious work up negative. He continued his treatment of pericarditis with colchicine, ibuprofen and protonix. Afebrile with WBC 8.2 on day of discharge. Repeat ESR 93 (30) and CRP 11.66 (24.11). Discussed with Dr. Khan who recommended the patient report to the ED for any recurring fever.     Patient remained in SR throughout hospital admission. Due to CHADS2 vasc score of 0, no AC was needed. He was rate controlled with metoprolol succinate. Plan for short course of BB since afib occurred in the setting of pericarditis.     Discharge wt 105kg    After all labs and VS were reviewed the decision was made that the patient was medically stable for discharge.  The patient was discharged in satisfactory condition.    More than 30 minutes were spent in coordinating patient discharge.

## 2023-11-02 NOTE — CARE PLAN
The patient's goals for the shift include to have improved pain control.     The clinical goals for the shift include to remain safe and stable throughout shift and have TTE completed.     Patient had TTE done and pericardiocentesis completed. Pericardial drain placed. Patient encouraged to ambulate and verbalize pain level for improved pain control.

## 2023-11-02 NOTE — CONSULTS
Vancomycin Dosing by Pharmacy- Cessation of Therapy    Consult to pharmacy for vancomycin dosing has been discontinued by the prescriber, pharmacy will sign off at this time.    Please call pharmacy if there are further questions or re-enter a consult if vancomycin is resumed.     Ulises Guerrero, EzequielD

## 2023-11-02 NOTE — POST-PROCEDURE NOTE
Physician Transition of Care Summary  Invasive Cardiovascular Lab    Procedure Date: 11/2/2023  Attending:    * Jazmyn Rich - Primary  Resident/Fellow/Other Assistant: Surgeon(s) and Role:     * Maria Florez MD - Fellow    Indications:   Pre-op Diagnosis     * Pericardial effusion [I31.39]    Post-procedure diagnosis:   Post-op Diagnosis     * Pericardial effusion [I31.39]    Procedure(s):     * Pericardiocentesis      Procedure Findings:   Drained 340 ml of serosanguinous pericardial fluid. Labs sent and drain sutured in-place at the end of procedure.   The pericardial fluid was very loculated. Attempts to break down the septae with catheter and wire. There was residual fluid on the posterolateral wall of the left ventricle. The wire could not be advanced to this region to drain the fluid. Attempts to access this localized pericardial space was challenging and therefore not completed.    Recommendations:   - Antibiotic (Ancef) for 24 hours  - Consider cross sectional imaging with CMR to exclude effusive constrictive pericarditis  - Follow pericardial fluid cytology, mcrobiology, cell count and chemistries    Description of the Procedure:   See cath report    Complications:   Pt tolerated procedure with no immediate complication.     Stents/Implants:   N/A    Anticoagulation/Antiplatelet Plan:   N/A    Estimated Blood Loss:   5 mL    Anesthesia: Moderate Sedation Anesthesia Staff: No anesthesia staff entered.    Any Specimen(s) Removed:   No specimens collected during this procedure.    Disposition:   Back to his room.       Electronically signed by: Maria Florez MD, 11/2/2023 4:10 PM

## 2023-11-02 NOTE — INTERVAL H&P NOTE
H&P reviewed. The patient was examined and there are no changes to the H&P.  Pt appears comfortable with intact radial pulses

## 2023-11-02 NOTE — PROGRESS NOTES
"Vancomycin Dosing by Pharmacy- FOLLOW UP    Jonah Britton is a 33 y.o. year old male who Pharmacy has been consulted for vancomycin dosing for pneumonia. Based on the patient's indication and renal status this patient is being dosed based on a goal AUC of 400-600.     Renal function is currently stable.    Current vancomycin dose: 1500 mg given every 12 hours    Estimated vancomycin AUC on current dose: 551 mg/L.hr     Visit Vitals  /75   Pulse 85   Temp 37 °C (98.6 °F) (Temporal)   Resp 18        Lab Results   Component Value Date    CREATININE 0.86 10/31/2023    CREATININE 0.78 10/26/2023    CREATININE 0.91 10/25/2023    CREATININE 0.87 10/24/2023        Patient weight is No results found for: \"PTWEIGHT\"    No results found for: \"CULTURE\"     No intake/output data recorded.  [unfilled]    Lab Results   Component Value Date    PATIENTTEMP 37.0 10/24/2023        Assessment/Plan    Patient is on day 2 of vancomycin therapy. Given 2 g load 11/1 AM and started on 1500 mg Q12H at OSH. Will continue vancomycin 1500 mg Q12H.    This dosing regimen is predicted by [employer] to result in the following pharmacokinetic parameters:  Exposure target: AUC24 (range)400-600 mg/L.hr   AUC24,ss: 551 mg/L.hr  Probability of AUC24 > 400: 80 %  Ctrough,ss: 17.1 mg/L  Probability of Ctrough,ss > 20: 38 %  Probability of nephrotoxicity (Lodise MARZENA 2009): 13 %    The next level will be obtained on 11/3 with AM labs. May be obtained sooner if clinically indicated.   Will continue to monitor renal function daily while on vancomycin and order serum creatinine at least every 48 hours if not already ordered.  Follow for continued vancomycin needs, clinical response, and signs/symptoms of toxicity.       Sriram Marte, PharmD           "

## 2023-11-02 NOTE — PROGRESS NOTES
Subjective Data:  Patient has pain with deep inspiration. Denies SOB. [place] he just broke his fever after being given tylenol by RN     Overnight Events:    None      Objective Data:  Last Recorded Vitals:  Vitals:    11/01/23 2354 11/02/23 0452 11/02/23 0717 11/02/23 0738   BP: 123/75 129/79 130/78    BP Location:   Right arm    Patient Position:   Lying    Pulse: 85 87 98    Resp: 18 17 18    Temp: 37 °C (98.6 °F) 36.9 °C (98.4 °F) 38.1 °C (100.6 °F) 38 °C (100.4 °F)   TempSrc: Temporal Temporal Temporal    SpO2: 94% 93% 94%    Weight:  106 kg (234 lb 2.1 oz)     Height:           Last Labs:  CBC - 10/31/2023:  5:50 PM  13.3 11.3 362    34.2      CMP - 10/31/2023:  5:50 PM  9.2 7.2 16 --- 0.6   3.4 4.2 27 61      PTT - No results in last year.  _   _ _     TROPHS   Date/Time Value Ref Range Status   10/31/2023 05:50 PM <3 0 - 20 ng/L Final   10/24/2023 08:04 PM 55 0 - 20 ng/L Final     Comment:     Previous result verified on 10/24/2023 1138 on specimen/case [id number] called with component [facility] for procedure Troponin I, High Sensitivity with value 84 ng/L.   10/24/2023 04:22 PM 46 0 - 20 ng/L Final     LDLCALC   Date/Time Value Ref Range Status   10/24/2023 04:22 PM 86 <=99 mg/dL Final     Comment:                                 Near   Borderline      AGE      Desirable  Optimal    High     High     Very High     0-19 Y     0 - 109     ---    110-129   >/= 130     ----    20-24 Y     0 - 119     ---    120-159   >/= 160     ----      >24 Y     0 -  99   100-129  130-159   160-189     >/=190       VLDL   Date/Time Value Ref Range Status   10/24/2023 04:22 PM 15 0 - 40 mg/dL Final      Last I/O:  I/O last 3 completed shifts:  In: - (0 mL/kg)   Out: 1100 (10.4 mL/kg) [Urine:1100 (0.3 mL/kg/hr)]  Weight: 106.2 kg     Past Cardiology Tests (Last 3 Years):  EKG:  ECG 12 Lead 10/26/2023      ECG 12 Lead 10/26/2023    Echo:  Transthoracic echo (TTE) limited 11/1/2023      Transthoracic Echo (TTE) Complete  "10/25/2023    Ejection Fractions:  No results found for: \"EF\"  Cath:  No results found for this or any previous visit from the past 1095 days.    Stress Test:  No results found for this or any previous visit from the past 1095 days.    Cardiac Imaging:  No results found for this or any previous visit from the past 1095 days.      Inpatient Medications:  Scheduled medications   Medication Dose Route Frequency    colchicine (gout)  0.6 mg oral BID    ibuprofen  600 mg oral TID    metoprolol tartrate  25 mg oral BID    pantoprazole  40 mg oral Daily before breakfast    piperacillin-tazobactam  3.375 g intravenous q6h    vancomycin  1,500 mg intravenous q12h     PRN medications   Medication    acetaminophen    Or    acetaminophen    Or    acetaminophen     Continuous Medications   Medication Dose Last Rate       Physical Exam:  GEN: NAD, face appears flushed  HEENT:  anicteric, no JVD  Skin: warm and clammy   CV: RRR, no r/g/m  Pulm: CTAB  Abdomen: NTND  Ext: warm, no LE edema noted, pedal pulses palpable bilaterally   Neuro: A+Ox3  Psych: appropriate     Assessment/Plan   Pt is a 34 yo previously healthy gentleman who developed a cold-like illness in October of 2023 c/b pericarditis. He is admitted a second time due to fevers, night sweats, and chest discomfort. Clinically suspect his symptoms are related to pericarditis that is ongoing    Pericarditis, continued episode  - Initially diagnosed 10/24-10/26/23 at [facility] where echo 10/25 completed showed an ejection fraction of 50 to 55% with moderate to severe pericardial effusion. Treated with PPI, colchicine and ibuprofen.   - Back to ED 10/31/23 for L shoulder pain, left chest discomfort,  and fever X2 days. Echo 10/31 showed large pericardial effusion.   - colchicine 0.6 BID  - ibuprofen 600 TID  - Protonix 40mg daily   - metop tartrate 25 BID  - protonix for GI protection  - may need to consider steroids should pain recur, balanced with risk of recurrence; " beyond that may consider anakinra  - viral panel negative thus far, CTA PE without e/o of pulmonary embolism.  - NPO midnight for consideration of therapeutic pericardiocentesis  - IC consult for pericardiocentesis   - Stat limited echo to reassess pericardial effusion     paroxysmal atrial fibrillation   - Initially diagnosed afib RVR (up to 179bpm) 10/24/23 at time of pericarditis, spontaneously converted to SR  - continue metop tartrate, rate controlled   - obtain ecg- sinus on exam and tele  - [id number] score of 0.  - TSH normal     Leukocytosis   - X-ray 10/31  did show some pulm bibasilar atelectasis/pulmonary edema with questionable left lower lobe infiltrate, CT angiography showed questionable lower lobe infiltrate, therefore added doxycycline to vancomycin/Zosyn for atypical coverage, did also obtain urine Legionella/pneumococcus antigen testing.   - stopped doxycycline at  as his legionella and strep was negative; unlikely an atypical pneumonia  - WBC 13,000  - Troponin negative, low suspicion for myocarditis   - MRSA pending   - Infectious disease consult       Code status: peresumed full  Dispo: inpt, pending eval of pericardial effusion/pericarditis   DVT PPX: low score, ambulation  Diet: regular, NPO midnight  Lines/tubes: Ivs    Seen and discussed with Dr. Guerrero     Peripheral IV 10/31/23 20 G Left Antecubital (Active)   Site Assessment Clean;Dry;Intact 11/02/23 0800   Dressing Type Transparent 11/02/23 0800   Line Status Flushed;Infusing 11/02/23 0800   Tubing Change Tubing changed 11/02/23 0800   Dressing Status Clean;Dry 11/02/23 0800   Number of days: 2       Code Status:  Full Code    I spent 40 minutes in the professional and overall care of this patient.        Nubia Washington, APRN-CNP

## 2023-11-03 ENCOUNTER — APPOINTMENT (OUTPATIENT)
Dept: CARDIOLOGY | Facility: HOSPITAL | Age: 33
DRG: 315 | End: 2023-11-03
Payer: COMMERCIAL

## 2023-11-03 LAB
BASOPHILS # BLD AUTO: 0.05 X10*3/UL (ref 0–0.1)
BASOPHILS NFR BLD AUTO: 0.5 %
C3 SERPL-MCNC: 207 MG/DL (ref 87–200)
C4 SERPL-MCNC: 44 MG/DL (ref 10–50)
CRP SERPL-MCNC: 33.13 MG/DL
EOSINOPHIL # BLD AUTO: 0.13 X10*3/UL (ref 0–0.7)
EOSINOPHIL NFR BLD AUTO: 1.4 %
ERYTHROCYTE [DISTWIDTH] IN BLOOD BY AUTOMATED COUNT: 13 % (ref 11.5–14.5)
ERYTHROCYTE [SEDIMENTATION RATE] IN BLOOD BY WESTERGREN METHOD: 30 MM/H (ref 0–15)
HCT VFR BLD AUTO: 34 % (ref 41–52)
HGB BLD-MCNC: 10.7 G/DL (ref 13.5–17.5)
HIV1 RNA # PLAS NAA DL=20: NOT DETECTED {COPIES}/ML
HIV1 RNA SPEC NAA+PROBE-LOG#: NORMAL {LOG_COPIES}/ML
IMM GRANULOCYTES # BLD AUTO: 0.03 X10*3/UL (ref 0–0.7)
IMM GRANULOCYTES NFR BLD AUTO: 0.3 % (ref 0–0.9)
LABORATORY COMMENT REPORT: NORMAL
LABORATORY COMMENT REPORT: NORMAL
LEFT VENTRICLE INTERNAL DIMENSION DIASTOLE: 5.7 (ref 3.5–6)
LYMPHOCYTES # BLD AUTO: 0.87 X10*3/UL (ref 1.2–4.8)
LYMPHOCYTES NFR BLD AUTO: 9.5 %
MCH RBC QN AUTO: 27.6 PG (ref 26–34)
MCHC RBC AUTO-ENTMCNC: 31.5 G/DL (ref 32–36)
MCV RBC AUTO: 88 FL (ref 80–100)
MONOCYTES # BLD AUTO: 0.96 X10*3/UL (ref 0.1–1)
MONOCYTES NFR BLD AUTO: 10.5 %
NEUTROPHILS # BLD AUTO: 7.12 X10*3/UL (ref 1.2–7.7)
NEUTROPHILS NFR BLD AUTO: 77.8 %
NRBC BLD-RTO: 0 /100 WBCS (ref 0–0)
PATH REPORT.FINAL DX SPEC: NORMAL
PATH REPORT.GROSS SPEC: NORMAL
PATH REPORT.RELEVANT HX SPEC: NORMAL
PATH REPORT.TOTAL CANCER: NORMAL
PLATELET # BLD AUTO: 409 X10*3/UL (ref 150–450)
RBC # BLD AUTO: 3.88 X10*6/UL (ref 4.5–5.9)
STAPHYLOCOCCUS SPEC CULT: NORMAL
VANCOMYCIN SERPL-MCNC: <2 UG/ML (ref 5–20)
WBC # BLD AUTO: 9.2 X10*3/UL (ref 4.4–11.3)

## 2023-11-03 PROCEDURE — 86160 COMPLEMENT ANTIGEN: CPT | Performed by: NURSE PRACTITIONER

## 2023-11-03 PROCEDURE — 2500000004 HC RX 250 GENERAL PHARMACY W/ HCPCS (ALT 636 FOR OP/ED): Performed by: NURSE PRACTITIONER

## 2023-11-03 PROCEDURE — 88305 TISSUE EXAM BY PATHOLOGIST: CPT | Performed by: PATHOLOGY

## 2023-11-03 PROCEDURE — 86038 ANTINUCLEAR ANTIBODIES: CPT | Performed by: NURSE PRACTITIONER

## 2023-11-03 PROCEDURE — 99232 SBSQ HOSP IP/OBS MODERATE 35: CPT | Performed by: PHYSICIAN ASSISTANT

## 2023-11-03 PROCEDURE — 93308 TTE F-UP OR LMTD: CPT | Performed by: INTERNAL MEDICINE

## 2023-11-03 PROCEDURE — 2500000001 HC RX 250 WO HCPCS SELF ADMINISTERED DRUGS (ALT 637 FOR MEDICARE OP): Performed by: NURSE PRACTITIONER

## 2023-11-03 PROCEDURE — 82164 ANGIOTENSIN I ENZYME TEST: CPT | Performed by: NURSE PRACTITIONER

## 2023-11-03 PROCEDURE — 80202 ASSAY OF VANCOMYCIN: CPT | Performed by: STUDENT IN AN ORGANIZED HEALTH CARE EDUCATION/TRAINING PROGRAM

## 2023-11-03 PROCEDURE — 86658 ENTEROVIRUS ANTIBODY: CPT | Performed by: NURSE PRACTITIONER

## 2023-11-03 PROCEDURE — 88305 TISSUE EXAM BY PATHOLOGIST: CPT | Mod: TC,MCY | Performed by: STUDENT IN AN ORGANIZED HEALTH CARE EDUCATION/TRAINING PROGRAM

## 2023-11-03 PROCEDURE — 36415 COLL VENOUS BLD VENIPUNCTURE: CPT | Performed by: NURSE PRACTITIONER

## 2023-11-03 PROCEDURE — 93321 DOPPLER ECHO F-UP/LMTD STD: CPT | Performed by: INTERNAL MEDICINE

## 2023-11-03 PROCEDURE — 2500000004 HC RX 250 GENERAL PHARMACY W/ HCPCS (ALT 636 FOR OP/ED): Performed by: STUDENT IN AN ORGANIZED HEALTH CARE EDUCATION/TRAINING PROGRAM

## 2023-11-03 PROCEDURE — 86747 PARVOVIRUS ANTIBODY: CPT | Performed by: NURSE PRACTITIONER

## 2023-11-03 PROCEDURE — 1200000002 HC GENERAL ROOM WITH TELEMETRY DAILY

## 2023-11-03 PROCEDURE — 86140 C-REACTIVE PROTEIN: CPT | Performed by: INTERNAL MEDICINE

## 2023-11-03 PROCEDURE — 93325 DOPPLER ECHO COLOR FLOW MAPG: CPT | Performed by: INTERNAL MEDICINE

## 2023-11-03 PROCEDURE — 93321 DOPPLER ECHO F-UP/LMTD STD: CPT

## 2023-11-03 PROCEDURE — 85025 COMPLETE CBC W/AUTO DIFF WBC: CPT | Performed by: NURSE PRACTITIONER

## 2023-11-03 PROCEDURE — 85652 RBC SED RATE AUTOMATED: CPT | Performed by: INTERNAL MEDICINE

## 2023-11-03 PROCEDURE — 2500000001 HC RX 250 WO HCPCS SELF ADMINISTERED DRUGS (ALT 637 FOR MEDICARE OP): Performed by: STUDENT IN AN ORGANIZED HEALTH CARE EDUCATION/TRAINING PROGRAM

## 2023-11-03 PROCEDURE — 99232 SBSQ HOSP IP/OBS MODERATE 35: CPT | Performed by: INTERNAL MEDICINE

## 2023-11-03 PROCEDURE — 99254 IP/OBS CNSLTJ NEW/EST MOD 60: CPT | Performed by: INTERNAL MEDICINE

## 2023-11-03 PROCEDURE — 99233 SBSQ HOSP IP/OBS HIGH 50: CPT | Performed by: INTERNAL MEDICINE

## 2023-11-03 RX ADMIN — COLCHICINE 0.6 MG: 0.6 TABLET, FILM COATED ORAL at 09:24

## 2023-11-03 RX ADMIN — METOPROLOL TARTRATE 25 MG: 25 TABLET, FILM COATED ORAL at 09:24

## 2023-11-03 RX ADMIN — METOPROLOL TARTRATE 25 MG: 25 TABLET, FILM COATED ORAL at 20:28

## 2023-11-03 RX ADMIN — ACETAMINOPHEN 650 MG: 325 TABLET ORAL at 20:31

## 2023-11-03 RX ADMIN — IBUPROFEN 600 MG: 600 TABLET ORAL at 15:31

## 2023-11-03 RX ADMIN — IBUPROFEN 600 MG: 600 TABLET ORAL at 20:31

## 2023-11-03 RX ADMIN — OXYCODONE HYDROCHLORIDE AND ACETAMINOPHEN 1 TABLET: 5; 325 TABLET ORAL at 05:19

## 2023-11-03 RX ADMIN — PANTOPRAZOLE SODIUM 40 MG: 40 TABLET, DELAYED RELEASE ORAL at 06:29

## 2023-11-03 RX ADMIN — IBUPROFEN 600 MG: 600 TABLET ORAL at 09:27

## 2023-11-03 RX ADMIN — CEFAZOLIN SODIUM 2 G: 2 INJECTION, SOLUTION INTRAVENOUS at 15:31

## 2023-11-03 RX ADMIN — COLCHICINE 0.6 MG: 0.6 TABLET, FILM COATED ORAL at 20:31

## 2023-11-03 RX ADMIN — CEFAZOLIN SODIUM 2 G: 2 INJECTION, SOLUTION INTRAVENOUS at 06:22

## 2023-11-03 RX ADMIN — ACETAMINOPHEN 650 MG: 325 TABLET ORAL at 04:01

## 2023-11-03 ASSESSMENT — ENCOUNTER SYMPTOMS
SORE THROAT: 0
CONSTIPATION: 0
CHILLS: 1
DYSURIA: 0
VOMITING: 0
FEVER: 1
WEAKNESS: 0
NAUSEA: 0
COUGH: 0
DIARRHEA: 0
ABDOMINAL PAIN: 0
APPETITE CHANGE: 0
RHINORRHEA: 0
SHORTNESS OF BREATH: 0

## 2023-11-03 ASSESSMENT — COGNITIVE AND FUNCTIONAL STATUS - GENERAL
DAILY ACTIVITIY SCORE: 24
MOBILITY SCORE: 24
DAILY ACTIVITIY SCORE: 24
MOBILITY SCORE: 24

## 2023-11-03 ASSESSMENT — PAIN - FUNCTIONAL ASSESSMENT: PAIN_FUNCTIONAL_ASSESSMENT: 0-10

## 2023-11-03 ASSESSMENT — PAIN SCALES - GENERAL
PAINLEVEL_OUTOF10: 7
PAINLEVEL_OUTOF10: 0 - NO PAIN

## 2023-11-03 NOTE — CARE PLAN
The patient's goals for the shift include pull heart drain    The clinical goals for the shift include hds today    Over the shift, the pt had less output this shift; pt ambulated in room and in the moralez; continue on scheduled IB; no fever today; continue to monitor.

## 2023-11-03 NOTE — PROGRESS NOTES
Jonah Britton is a 33 y.o. male on day 2 of admission presenting with Pericarditis associated with atrial septal defect.    Subjective   Interval History: Patient continues to have low-grade temp of 100.4.  Underwent pericardiocentesis yesterday      Objective   Range of Vitals (last 24 hours)  Heart Rate:  []   Temp:  [36.2 °C (97.1 °F)-38 °C (100.4 °F)]   Resp:  [12-20]   BP: (105-144)/(66-82)   Weight:  [106 kg (233 lb 11 oz)]   SpO2:  [93 %-97 %]   Daily Weight  11/03/23 : 106 kg (233 lb 11 oz)    Body mass index is 27.71 kg/m².    Physical Exam  GENERAL APPEARANCE: Awake and alert and not in any distress  HEENT: Atraumatic and normocephalic  CARDIAC: Regular .  Pericardial drain noted with blood-tinged water consistency fluid in the bag  LUNGS: Clear  ABDOMEN: Soft and nontender  EXTREMITIES: No edema  SKIN: No rash or ulcers      Antibiotics  acetaminophen (Tylenol) tablet 650 mg  acetaminophen (Tylenol) oral liquid 650 mg  acetaminophen (Tylenol) suppository 650 mg  colchicine (gout) tablet 0.6 mg  piperacillin-tazobactam-dextrose (Zosyn) IV 3.375 g  vancomycin (Vancocin) in dextrose 5 % water (D5W) 500 mL IV 1,500 mg  ibuprofen tablet 600 mg  metoprolol tartrate (Lopressor) tablet 25 mg  pantoprazole (ProtoNix) EC tablet 40 mg  vancomycin (Vancocin) in dextrose 5 % water (D5W) 500 mL IV 1,500 mg  perflutren lipid microspheres (Definity) injection 0.5-10 mL of dilution  sulfur hexafluoride microsphr (Lumason) injection 24.28 mg  perflutren protein A microsphere (Optison) injection 0.5 mL  midazolam (Versed) injection  - Omnicell Override Pull  fentaNYL PF (Sublimaze) injection  - Omnicell Override Pull  fentaNYL PF (Sublimaze) injection  midazolam (Versed) injection  sodium chloride 0.9% infusion  lidocaine (Xylocaine) 20 mg/mL (2 %) injection  ceFAZolin (Ancef) injection  - Omnicell Override Pull  ceFAZolin (Ancef) IV in dextrose 5%  fentaNYL PF (Sublimaze) injection  - Omnicell Override  Pull  perflutren lipid microspheres (Definity) injection 0.5-10 mL of dilution  ceFAZolin in dextrose (iso-os) (Ancef) IVPB 2 g  oxyCODONE-acetaminophen (Percocet) 5-325 mg per tablet 1 tablet  perflutren lipid microspheres (Definity) injection 0.5-10 mL of dilution  sulfur hexafluoride microsphr (Lumason) injection 24.28 mg  perflutren protein A microsphere (Optison) injection 0.5 mL      Relevant Results  Labs  Results from last 72 hours   Lab Units 11/03/23  0539 11/02/23  1110 10/31/23  1750   WBC AUTO x10*3/uL 9.2 13.0* 13.3*   HEMOGLOBIN g/dL 10.7* 11.2* 11.3*   HEMATOCRIT % 34.0* 34.8* 34.2*   PLATELETS AUTO x10*3/uL 409 396 362   NEUTROS PCT AUTO % 77.8  --  79.4   LYMPHS PCT AUTO % 9.5  --  10.1   MONOS PCT AUTO % 10.5  --  8.1   EOS PCT AUTO % 1.4  --  1.6     Results from last 72 hours   Lab Units 11/02/23  1110 10/31/23  1750   SODIUM mmol/L 138 137   POTASSIUM mmol/L 4.2 3.6   CHLORIDE mmol/L 99 100   CO2 mmol/L 26 27   BUN mg/dL 6 10   CREATININE mg/dL 0.77 0.86   GLUCOSE mg/dL 91 91   CALCIUM mg/dL 8.9 9.2   ANION GAP mmol/L 17 14   EGFR mL/min/1.73m*2 >90 >90     Results from last 72 hours   Lab Units 11/02/23  1110 10/31/23  1750   ALK PHOS U/L 69 61   BILIRUBIN TOTAL mg/dL 0.6 0.6   PROTEIN TOTAL g/dL 6.3* 7.2   ALT U/L 30 27   AST U/L 17 16   ALBUMIN g/dL 3.8 4.2     Estimated Creatinine Clearance: 125 mL/min (by C-G formula based on SCr of 0.77 mg/dL).  C-Reactive Protein   Date Value Ref Range Status   11/03/2023 33.13 (H) <1.00 mg/dL Final   10/25/2023 7.76 (H) <1.00 mg/dL Final     Microbiology       Assessment/Plan   33-year-old male patient with no significant medical problems and recent hospital admission for pericarditis and atrial fibrillation presented to the hospital for worsening chest pain and fever.  Patient was recently diagnosed with pericarditis and discharged on oral colchicine and naproxen.     Patient was in his usual state of health until a month ago when he had chest cold.   Patient took over-the-counter medication with which symptoms resolved.  A week ago patient was having chest pain and palpitations and so presented to emergency room.  Patient is diagnosed with pericardial effusion and started on colchicine and naproxen.  After going home patient continued to have fever.  Patient feels extremely weak during these episodes of fever but once fever resolves he feels much better.     Patient denies high risk behavior including illicit drug use.  Denies history of skin rash or recurrent skin infections.  Denies recent invasive procedures.  No travel or contact with sick people.     Assessment  Pericardial effusion and fever     11/2/23  I had an extensive discussion with patient and his family members who are at bedside regarding etiology of pericardial effusion which is extensive includes infectious and noninfectious etiology.  Infectious etiology is mostly viral.  Patient does not has any risk factors to suspect bacterial pericarditis.  I informed the family that despite extensive work-up most of the time we will not know the exact etiology.     CT chest shows minimal left basilar atelectasis and so no need for antibiotics.     Follow-up on blood cultures.     Please send pericardial fluid for cell count with differential, Gram stain and culture, fungal stain and culture, AFB stain and culture and cytology.     Please check HIV antigen antibody screen, acute hepatitis panel, coxsackie serology, parvovirus PCR, nasopharyngeal swab for complete respiratory viral panel and atypical pneumonia panel and urine and blood histoplasma antigen.     Please check SUE and rheumatoid factor.     Plan  Reviewed pericardial fluid cultures in microbiology lab and there is no growth so far.  Pericardial fluid had about 3000 WBC of which 70% are lymphocytes (RBC are more than 130,000).  Several infectious tests are still pending.  Please call ID back if any of the infectious test result positive.  Will  follow peripherally.           Qasim Khan MD

## 2023-11-03 NOTE — CONSULTS
Reason For Consult  Acute Pericarditis    History Of Present Illness  Jonah Britton is a 33 y.o. male presenting with acute pericarditis with a moderate-large pericardial effusion. The patient was previously admitted and diagnosed with pericarditis with a pericardial effusion at [facility] following a Flu like illness. His symptoms started around early October with runny nose, fevers, myalgias and fatigue. He used OTC analgesia and decongestants. Symptoms prolonged for a couple of weeks but finally improved enough for him to go back to work. Later he developed chest pain, pleuritic in nature and fevers started coming back. 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  He was then transferred to [facility] ED where he was found to have pericarditis and newly discovered atrial fibrillation. He was discharged on 10/26 on ibuprofen, colchicine, and metoprolol. He returned to [facility] on 10/31 in the setting of fever, worsening left sided chest discomfort. His last echo on 10/30 showed a moderate to large pericardial effusion without evidence of cardiac tamponade.     He was started on vancomycin and zosyn at [facility] for concern for bacteremia, in addition to doxycycline in case of atypical pneumonia in the setting of questionable left lower lobe infiltrate. The doxycyline was discontinued here in the setting of negative legionella and strep, making atypical pneumonia unlikely. He was also found to be negative for flu and covid. His blood cultures from 10/31 remain negative. Urinalysis was negative.     On admission, his WBC was 13.3. He has had a Tmax of 38.8.     The patient reports having intermittent fevers when he is not feeling well. For example, last night from 3am-9:30am today he was feeling febrile with chills and sweats and decreased appetite. He endorses chest pain with deep inspiration without SOB. He denies bowel or bladder  changes. He says that when he does not have the fevers he feels ok. He denies recent travel and does not have children.    He denies arthralgias, oral ulcers, genital ulcers, rash, photosensitivity, RP, Alopecia  Denies prior episodes of serositis  Denies eye symptoms, no prior recurrent sinusitis,   Denies hemoptysis, epistaxis, aural symptoms  Denies GI symptoms  Denies Urinary symptoms  Denies noticing lumps / bumps  Denies weight loss    Wife was sick with similar upper respiratory symptoms after he got sick, but she is recovering ok.  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         Past Medical History  He has no past medical history on file.    Surgical History  He has no past surgical history on file.     Social History  He reports that he quit smoking about 4 years ago. His smoking use included cigarettes. He has never used smokeless tobacco. No history on file for alcohol use and drug use.    Family History  No family history on file.     Allergies  Patient has no known allergies.    Review of Systems  Review of Systems   All other systems reviewed and are negative.        Physical Exam  Physical Exam  General: Cooperative, in NAD   Eyes: Conjunctiva clear, sclera white, anicteric   Nose: No external deformity, no mucosal crusting or signs of bleeding   Throat/Mouth: Moist mucous membranes, normal dentition, no ulcers or lesions   Lungs: No respiratory distress; lungs CTAB; no wheezes, rales, rhonchi, or stridor   Heart: Normal S1 and S2; regular rate and rhythm; no murmurs, rubs, or gallopse  Pericardial drain in place with hemorrhagic effusion  Skin: No rashes, ulcers, tophi, or nodules noted  MSK:   Spine: Normal posture, no point tenderness to palpation, normal ROM  Upper Extremities:   Hands: No erythema, warmth,  "synovitis, or pain of the DIPs, PIPs, or MCPs; normal ROM  Wrists: No erythema, warmth, synovitis, or pain of the wrists; normal ROM  Elbows: No erythema, warmth, synovitis, or pain; normal ROM   Shoulders: No erythema, warmth, appreciable swelling, or pain; normal ROM   Lower Extremities:   Hips: No gross deformity, erythema, warmth, or pain; normal ROM   Knees: No erythema, warmth, swelling, or pain; normal ROM   Ankles: No erythema, warmth, synovitis, or pain; normal ROM  Feet: No gross deformity, erythema, or swelling; MTP squeeze negative bilaterally      Last Recorded Vitals  Blood pressure 105/66, pulse 79, temperature 36.2 °C (97.1 °F), temperature source Temporal, resp. rate 18, height 1.956 m (6' 5.01\"), weight 106 kg (233 lb 11 oz), SpO2 96 %.    Relevant Results        Scheduled medications  ceFAZolin in dextrose (iso-os), 2 g, intravenous, q8h  colchicine (gout), 0.6 mg, oral, BID  ibuprofen, 600 mg, oral, TID  metoprolol tartrate, 25 mg, oral, BID  pantoprazole, 40 mg, oral, Daily before breakfast      Continuous medications     PRN medications  PRN medications: acetaminophen **OR** acetaminophen **OR** acetaminophen, oxyCODONE-acetaminophen    Results for orders placed or performed during the hospital encounter of 11/01/23 (from the past 96 hour(s))   CBC   Result Value Ref Range    WBC 13.0 (H) 4.4 - 11.3 x10*3/uL    nRBC 0.0 0.0 - 0.0 /100 WBCs    RBC 3.93 (L) 4.50 - 5.90 x10*6/uL    Hemoglobin 11.2 (L) 13.5 - 17.5 g/dL    Hematocrit 34.8 (L) 41.0 - 52.0 %    MCV 89 80 - 100 fL    MCH 28.5 26.0 - 34.0 pg    MCHC 32.2 32.0 - 36.0 g/dL    RDW 12.7 11.5 - 14.5 %    Platelets 396 150 - 450 x10*3/uL   Comprehensive metabolic panel   Result Value Ref Range    Glucose 91 74 - 99 mg/dL    Sodium 138 136 - 145 mmol/L    Potassium 4.2 3.5 - 5.3 mmol/L    Chloride 99 98 - 107 mmol/L    Bicarbonate 26 21 - 32 mmol/L    Anion Gap 17 10 - 20 mmol/L    Urea Nitrogen 6 6 - 23 mg/dL    Creatinine 0.77 0.50 - 1.30 " mg/dL    eGFR >90 >60 mL/min/1.73m*2    Calcium 8.9 8.6 - 10.6 mg/dL    Albumin 3.8 3.4 - 5.0 g/dL    Alkaline Phosphatase 69 33 - 120 U/L    Total Protein 6.3 (L) 6.4 - 8.2 g/dL    AST 17 9 - 39 U/L    Bilirubin, Total 0.6 0.0 - 1.2 mg/dL    ALT 30 10 - 52 U/L   Hepatitis B Surface Antibody   Result Value Ref Range    Hepatitis B Surface .5 (H) <10.0 mIU/mL   Sterile Fluid Culture/Smear    Specimen: Pericardial; Fluid   Result Value Ref Range    Sterile Fluid Culture/Smear No growth to date     Gram Stain (2+) Few Polymorphonuclear leukocytes     Gram Stain No organisms seen    AFB Culture/Smear    Specimen: Pericardial; Fluid   Result Value Ref Range    AFB Culture       Culture in progress and will be examined weekly. A result will be issued either when positive or after 8 weeks incubation.    AFB Stain No acid fast bacilli seen    Fungal Culture/Smear    Specimen: Pericardial; Fluid   Result Value Ref Range    Fungal Culture/Smear       Culture in progress, a report will be issued when positive or after 2 weeks of incubation.    Fungal Smear No fungal elements seen    Lactate Dehydrogenase, Fluid   Result Value Ref Range    LD, Fluid 462 Not established. U/L   Body Fluid Cell Count   Result Value Ref Range    Color, Fluid Red (A) Colorless, Straw, Yellow    Clarity, Fluid Turbid (A) Clear    WBC, Fluid 3,096 See Comment /uL    RBC, Fluid 133,000 0  /uL /uL   Body Fluid Differential   Result Value Ref Range    Neutrophils %, Manual, Fluid 22 <25 % %    Lymphocytes %, Manual, Fluid 65 <75 % %    Mono/Macrophages %, Manual, Fluid 13 <70 % %    Eosinophils %, Manual, Fluid 0 0 % %    Basophils %, Manual, Fluid 0 0 % %    Immature Granulocytes %, Manual, Fluid 0 0 % %    Blasts %, Manual, Fluid 0 0 % %    Unclassified Cells %, Manual, Fluid 0 0 % %    Plasma Cells %, Manual, Fluid 0 0 % %    Total Cells Counted, Fluid 100    Protein, Urine Random   Result Value Ref Range    Total Protein, Urine Random 35 (H) 5  - 25 mg/dL    Creatinine, Urine Random 124.6 20.0 - 370.0 mg/dL    T. Protein/Creatinine Ratio 0.28 (H) 0.00 - 0.17 mg/mg Creat   HIV RNA, quantitative, PCR   Result Value Ref Range    HIV-1 RNA PCR Viral Load Log      HIV RNA Result Not Detected Not Detected   Hepatitis B Surface Antigen   Result Value Ref Range    Hepatitis B Surface AG Nonreactive Nonreactive   Hepatitis C Antibody   Result Value Ref Range    Hepatitis C AB Nonreactive Nonreactive   Vancomycin   Result Value Ref Range    Vancomycin <2.0 (L) 5.0 - 20.0 ug/mL   CBC and Auto Differential   Result Value Ref Range    WBC 9.2 4.4 - 11.3 x10*3/uL    nRBC 0.0 0.0 - 0.0 /100 WBCs    RBC 3.88 (L) 4.50 - 5.90 x10*6/uL    Hemoglobin 10.7 (L) 13.5 - 17.5 g/dL    Hematocrit 34.0 (L) 41.0 - 52.0 %    MCV 88 80 - 100 fL    MCH 27.6 26.0 - 34.0 pg    MCHC 31.5 (L) 32.0 - 36.0 g/dL    RDW 13.0 11.5 - 14.5 %    Platelets 409 150 - 450 x10*3/uL    Neutrophils % 77.8 40.0 - 80.0 %    Immature Granulocytes %, Automated 0.3 0.0 - 0.9 %    Lymphocytes % 9.5 13.0 - 44.0 %    Monocytes % 10.5 2.0 - 10.0 %    Eosinophils % 1.4 0.0 - 6.0 %    Basophils % 0.5 0.0 - 2.0 %    Neutrophils Absolute 7.12 1.20 - 7.70 x10*3/uL    Immature Granulocytes Absolute, Automated 0.03 0.00 - 0.70 x10*3/uL    Lymphocytes Absolute 0.87 (L) 1.20 - 4.80 x10*3/uL    Monocytes Absolute 0.96 0.10 - 1.00 x10*3/uL    Eosinophils Absolute 0.13 0.00 - 0.70 x10*3/uL    Basophils Absolute 0.05 0.00 - 0.10 x10*3/uL   C3 complement   Result Value Ref Range    C3 Complement 207 (H) 87 - 200 mg/dL   C4 complement   Result Value Ref Range    C4 Complement 44 10 - 50 mg/dL   C-reactive protein   Result Value Ref Range    C-Reactive Protein 33.13 (H) <1.00 mg/dL   Sedimentation rate, automated   Result Value Ref Range    Sedimentation Rate 30 (H) 0 - 15 mm/h      Assessment/Plan     # Post-viral Pericarditis  ?Refractory to NSAID and Colchicine - only prescribed 1 week ago  Echo showed large circumferential  pericardial effusion with loculations / fibrin strands evident on the echo images  Initially had troponin leak at  [place] concerning for myopericarditis but currently troponin is normal  And SWMA was without hypokinesias of LV  Pericardial fluid analysis shows hemorrhagic pericardial effusion  Of note, CT chest does not show any mediastinal mass or lung mass concerning for malignancy such as CA lung or lymphoma/atypical infections like TB etc or granulomatous diseases like sarcoidosis. Echo without LV hypertrophy    Cultures, cytologies pending  Undergoing infectious work up including echovirus and coxackie virus ( Influenza and COVID previously negative) r  ecent extended respiratory panel pending  C3 HIGH as an inflammatory response ,C4 normal  SUE, MAXIMINO , ACE levels requested yesterday are pending  No clinical features of Autoimmune disease thus far. No prior drug exposures to explain drug induced lupus/vasculitis with serositis.  Suspicion low at this point, will follow labs    Management of this acute post viral pericarditis as per primary team    Case to be discussed with Dr Hernandez  Plan not final until countersigned by the attending    I spent 45 minutes in the professional and overall care of this patient.      Beau Quintana MD

## 2023-11-03 NOTE — PROGRESS NOTES
Jonah Britton is a 33 y.o. male on day 2 of admission presenting with Pericarditis associated with atrial septal defect.    Subjective   Interval History:   Patient had pericardiocentesis yesterday. The fluid was mildly red and turbid with a WBC of 3096 and RBC of 133,000 with lymphocytic predominance (see results)    HepC antibody negative, HepB surface antigen nonreactive, UPCR 0.28. CRP 33.13, ESR  30. Other infectious workup still pending.       Today the patient is feeling well. He states that he is having less chest pain than yesterday, only bothered by continued drain. He had 2 episodes of fever and chills last night with a Tmax of 38. He says that during these episodes he feels better than he used to. He says when he feels chills it feels as though his entire ramirez is constricting.    Review of Systems   Constitutional:  Positive for chills and fever. Negative for appetite change.   HENT:  Negative for rhinorrhea and sore throat.    Respiratory:  Negative for cough and shortness of breath.    Cardiovascular:  Positive for chest pain.   Gastrointestinal:  Negative for abdominal pain, constipation, diarrhea, nausea and vomiting.   Genitourinary:  Negative for dysuria and urgency.   Neurological:  Negative for weakness.       Objective   Range of Vitals (last 24 hours)  Heart Rate:  []   Temp:  [36.5 °C (97.7 °F)-38 °C (100.4 °F)]   Resp:  [12-20]   BP: (116-152)/(65-92)   Weight:  [106 kg (233 lb 11 oz)]   SpO2:  [93 %-100 %]   Daily Weight  11/03/23 : 106 kg (233 lb 11 oz)    Body mass index is 27.71 kg/m².    Physical Exam  Constitutional:       General: He is not in acute distress.     Appearance: Normal appearance. He is normal weight. He is not toxic-appearing. Diaphoretic: wiping at face with towel.     Comments: Wiping at face with towel     HENT:      Head: Normocephalic.      Mouth/Throat:      Mouth: Mucous membranes are moist.      Pharynx: Oropharynx is clear.   Eyes:      Pupils: Pupils are  equal, round, and reactive to light.   Cardiovascular:      Rate and Rhythm: Normal rate and regular rhythm.   Pulmonary:      Effort: Pulmonary effort is normal.      Breath sounds: Normal breath sounds.   Abdominal:      General: Abdomen is flat. There is no distension.      Palpations: Abdomen is soft.      Tenderness: There is no abdominal tenderness.   Musculoskeletal:         General: Normal range of motion.      Cervical back: Normal range of motion.   Skin:     General: Skin is warm and dry.   Neurological:      General: No focal deficit present.      Mental Status: He is alert.   Psychiatric:         Mood and Affect: Mood normal.         Behavior: Behavior normal.       Antibiotics  ceFAZolin (Ancef) IV in dextrose 5%    Relevant Results  Labs  Results from last 72 hours   Lab Units 11/03/23  0539 11/02/23  1110 10/31/23  1750   WBC AUTO x10*3/uL 9.2 13.0* 13.3*   HEMOGLOBIN g/dL 10.7* 11.2* 11.3*   HEMATOCRIT % 34.0* 34.8* 34.2*   PLATELETS AUTO x10*3/uL 409 396 362   NEUTROS PCT AUTO % 77.8  --  79.4   LYMPHS PCT AUTO % 9.5  --  10.1   MONOS PCT AUTO % 10.5  --  8.1   EOS PCT AUTO % 1.4  --  1.6     Results from last 72 hours   Lab Units 11/02/23  1110 10/31/23  1750   SODIUM mmol/L 138 137   POTASSIUM mmol/L 4.2 3.6   CHLORIDE mmol/L 99 100   CO2 mmol/L 26 27   BUN mg/dL 6 10   CREATININE mg/dL 0.77 0.86   GLUCOSE mg/dL 91 91   CALCIUM mg/dL 8.9 9.2   ANION GAP mmol/L 17 14   EGFR mL/min/1.73m*2 >90 >90     Results from last 72 hours   Lab Units 11/02/23  1110 10/31/23  1750   ALK PHOS U/L 69 61   BILIRUBIN TOTAL mg/dL 0.6 0.6   PROTEIN TOTAL g/dL 6.3* 7.2   ALT U/L 30 27   AST U/L 17 16   ALBUMIN g/dL 3.8 4.2     Estimated Creatinine Clearance: 125 mL/min (by C-G formula based on SCr of 0.77 mg/dL).  C-Reactive Protein   Date Value Ref Range Status   11/03/2023 33.13 (H) <1.00 mg/dL Final   10/25/2023 7.76 (H) <1.00 mg/dL Final     Contains abnormal data Body Fluid Cell Count  Component      Latest Ref  Rn 11/2/2023   Color, Fluid      Colorless, Straw, Yellow  Red !    Clarity, Fluid      Clear  Turbid !    WBC, Fluid      See Comment /uL 3,096    RBC, Fluid      0  /uL /uL 133,000       Legend:  ! Abnormal    Component      Latest Ref Rn 11/2/2023   Neutrophils %, Manual, Fluid      <25 % % 22    Lymphocytes %, Manual, Fluid      <75 % % 65    Mono/Macrophages %, Manual, Fluid      <70 % % 13    Eosinophils %, Manual, Fluid      0 % % 0    Basophils %, Manual, Fluid      0 % % 0    Immature Granulocytes %, Manual, Fluid      0 % % 0    Blasts %, Manual, Fluid      0 % % 0    Unclassified Cells %, Manual, Fluid      0 % % 0    Plasma Cells %, Manual, Fluid      0 % % 0    Total Cells Counted, Fluid 100      Microbiology    Component      Latest Ref Rn 11/2/2023   Hepatitis B Surface AG      Nonreactive  Nonreactive    Hepatitis C AB      Nonreactive  Nonreactive        Component      Latest Ref Rn 11/3/2023   C-Reactive Protein      <1.00 mg/dL 33.13 (H)    Sed Rate      0 - 15 mm/h 30 (H)       Legend:  (H) High    Pericardiocentesis fungal smear - negative to date  Pericardiocentesis sterile fluid culture - 2+ PNMs, no growth to date        Assessment/Plan   Jonah Britton is a 33 y.o. male presenting with pericarditis with a moderate-large pericardial effusion. He was previously discharged with pericarditis on 10/26 and has returned, reporting repeated fevers.     The drainage from his pericardiocentesis yesterday shows mainly lymphocytes with blood tinge which speaks in favor of a viral source of infection. To date, there has been no growth on the bacterial or the fungal plates.    We will continue to follow up the cultures and other laboratory testing. No change to treatment at this time.    Recommendations  - continue to follow up blood cultures  - continue to follow up fluid cultures  - continue to follow up viral panels    Patient and recommendations have been discussed with Dr. Khan.  Recommendations are not final until note has been cosigned by an attending.       KEVIN BUI, MS4

## 2023-11-03 NOTE — PROGRESS NOTES
Subjective Data:  Pt feeling better today, more comfortable with inspiration. Pending labs from pericardiocentesis. Pending various labs requested by ID. Borderline fevers overnight, temp 38 x2.     - repeat limited TTE tomorrow AM     Overnight Events:    None      Objective Data:  Last Recorded Vitals:  Vitals:    11/03/23 0404 11/03/23 0621 11/03/23 0759 11/03/23 1217   BP: 144/82  136/78 105/66   BP Location:   Left arm Left arm   Patient Position:   Lying Lying   Pulse: (!) 117  98 79   Resp: 20  19 18   Temp: 38 °C (100.4 °F)  36.6 °C (97.9 °F) 36.2 °C (97.1 °F)   TempSrc: Temporal  Temporal Temporal   SpO2: 97%  93% 96%   Weight:  106 kg (233 lb 11 oz)     Height:           Last Labs:  CBC - 11/3/2023:  5:39 AM  9.2 10.7 409    34.0      CMP - 11/2/2023: 11:10 AM  8.9 6.3 17 --- 0.6   3.4 3.8 30 69      PTT - No results in last year.  _   _ _     TROPHS   Date/Time Value Ref Range Status   10/31/2023 05:50 PM <3 0 - 20 ng/L Final   10/24/2023 08:04 PM 55 0 - 20 ng/L Final     Comment:     Previous result verified on 10/24/2023 1138 on specimen/case [id number] called with component [facility] for procedure Troponin I, High Sensitivity with value 84 ng/L.   10/24/2023 04:22 PM 46 0 - 20 ng/L Final     LDLCALC   Date/Time Value Ref Range Status   10/24/2023 04:22 PM 86 <=99 mg/dL Final     Comment:                                 Near   Borderline      AGE      Desirable  Optimal    High     High     Very High     0-19 Y     0 - 109     ---    110-129   >/= 130     ----    20-24 Y     0 - 119     ---    120-159   >/= 160     ----      >24 Y     0 -  99   100-129  130-159   160-189     >/=190       VLDL   Date/Time Value Ref Range Status   10/24/2023 04:22 PM 15 0 - 40 mg/dL Final      Last I/O:  I/O last 3 completed shifts:  In: 1340 (12.6 mL/kg) [P.O.:840; IV Piggyback:500]  Out: 2505 (23.6 mL/kg) [Urine:2500 (0.7 mL/kg/hr); Blood:5]  Weight: 106 kg     Past Cardiology Tests (Last 3 Years):  EKG:  ECG 12 Lead  "10/26/2023      ECG 12 Lead 10/26/2023    Echo:  Transthoracic echo (TTE) limited 11/1/2023      Transthoracic Echo (TTE) Complete 10/25/2023    Ejection Fractions:  No results found for: \"EF\"  Cath:  No results found for this or any previous visit from the past 1095 days.    Stress Test:  No results found for this or any previous visit from the past 1095 days.    Cardiac Imaging:  No results found for this or any previous visit from the past 1095 days.      Inpatient Medications:  Scheduled medications   Medication Dose Route Frequency    ceFAZolin in dextrose (iso-os)  2 g intravenous q8h    colchicine (gout)  0.6 mg oral BID    ibuprofen  600 mg oral TID    metoprolol tartrate  25 mg oral BID    pantoprazole  40 mg oral Daily before breakfast     PRN medications   Medication    acetaminophen    Or    acetaminophen    Or    acetaminophen    oxyCODONE-acetaminophen     Continuous Medications   Medication Dose Last Rate       Physical Exam:  GEN: laying in bed, NAD  HEENT: unable to appreciate JVD   Skin: warm and clammy   CV: RRR  Pulm: CTAB  Abdomen: soft, NT  Ext: no LE edema   Neuro: A/Ox3  Psych: appropriate     Assessment/Plan   Pt is a 32 yo previously healthy gentleman who developed a cold-like illness in October of 2023 c/b pericarditis. He is admitted a second time due to fevers, night sweats, and chest discomfort. Clinically suspect his symptoms are related to pericarditis that is ongoing    Pericarditis, continued episode  - Initially diagnosed 10/24-10/26/23 at [facility] where echo 10/25 completed showed an ejection fraction of 50 to 55% with moderate to severe pericardial effusion. Treated with PPI, colchicine and ibuprofen.   - Back to ED 10/31/23 for L shoulder pain, left chest discomfort,  and fever X2 days.   - Echo 10/31 showed large pericardial effusion  - cont colchicine 0.6 BID, ibuprofen 600 TID, metop tartrate  - protonix for GI protection  - may need to consider steroids should pain recur, " balanced with risk of recurrence  - viral panel negative thus far, CTA PE without e/o of pulmonary embolism  - s/p periocardiocentesis yesterday for ~350cc, drain in place   - pericardial fluid labs pending   - repeat limited TTE tomorrow AM      Paroxysmal atrial fibrillation   - Initially diagnosed afib RVR (up to 179bpm) 10/24/23 at time of pericarditis, spontaneously converted to SR  - cont metop tartrate, rate controlled   - currently NSR   - [id number] score of 0  - TSH normal     Leukocytosis   - X-ray 10/31 some pulm bibasilar atelectasis/pulmonary edema with questionable left lower lobe infiltrate, CT angiography showed questionable lower lobe infiltrate, therefore added doxycycline to vancomycin/Zosyn for atypical coverage, did also obtain urine Legionella/pneumococcus antigen testing.   - stopped doxycycline at  as his legionella and strep was negative; unlikely an atypical pneumonia  - admit WBC 13,000  - Troponin negative, low suspicion for myocarditis   - bld cx x2 10/31 NGTD x2  - temp 38 @ 2000, then again 0400 treated with tylenol   - MRSA pending   - Infectious disease consult, labs pending       Dispo  pending eval of pericardial effusion/pericarditis       Seen and discussed with Dr. Guerrero       Code Status:  Full Code    I spent 40 minutes in the professional and overall care of this patient.        LORENA Restrepo-CNP

## 2023-11-03 NOTE — PROGRESS NOTES
Subjective Data:  Jonah Britton, a 32 y/o  M, was transferred here with pericarditis with moderate-large pericardial effusion.   PMH:  Pt developed what he thought was a chest cold, SOB and fatigue & admitted to OSH 10/26/23 with ibuprofen, colchicine, and metoprolol. Returned to OSH 10/31 with intermittent fevers, worsening left chest discomfort with deep inspirations. ECHO 10/30 showed a moderate to large pericardial effusion without tamponade. He was negative for flu & COVID. Pericardiocentesis drained 340 mls of bloody fluid.       Overnight Events:    No issues overnight. Not much drainage documented, about 40 mls recorded as of 15:00 today. Pt states he is feeling better.      Objective Data:  Last Recorded Vitals:  Vitals:    11/03/23 0404 11/03/23 0621 11/03/23 0759 11/03/23 1217   BP: 144/82  136/78 105/66   BP Location:   Left arm Left arm   Patient Position:   Lying Lying   Pulse: (!) 117  98 79   Resp: 20  19 18   Temp: 38 °C (100.4 °F)  36.6 °C (97.9 °F) 36.2 °C (97.1 °F)   TempSrc: Temporal  Temporal Temporal   SpO2: 97%  93% 96%   Weight:  106 kg (233 lb 11 oz)     Height:           Last Labs:  CBC - 11/3/2023:  5:39 AM  9.2 10.7 409    34.0      CMP - 11/2/2023: 11:10 AM  8.9 6.3 17 --- 0.6   3.4 3.8 30 69      PTT - No results in last year.  _   _ _      Last I/O:  I/O last 3 completed shifts:  In: 1340 (12.6 mL/kg) [P.O.:840; IV Piggyback:500]  Out: 2505 (23.6 mL/kg) [Urine:2500 (0.7 mL/kg/hr); Blood:5]  Weight: 106 kg       Inpatient Medications:  Scheduled medications   Medication Dose Route Frequency    colchicine (gout)  0.6 mg oral BID    ibuprofen  600 mg oral TID    metoprolol tartrate  25 mg oral BID    pantoprazole  40 mg oral Daily before breakfast     PRN medications   Medication    acetaminophen    Or    acetaminophen    Or    acetaminophen    oxyCODONE-acetaminophen     Continuous Medications   Medication Dose Last Rate       Physical Exam:    HEENT:    EOMI, clear sclera, MMM, no  lesions noted   RESP:       CTAB  CARDIO:  RRR, S1,S2, drainage in bag when seen=lighter bloody fluid approx. 30-35 mls.  ABD:        Soft, nontender, + BS  EXT:         Bandage over chest area/access site, clean, no swelling or oozing from site.      Assessment/Plan   32 y/o M, with pericarditis, most likely due to recent viral infection also had pericardiocentesis yesterday afternoon with 340 mls of bloody fluid removed. Additional 35 ml today.   Will leave in drain for tomorrow, need limited ECHO in AM to determine remaining effusion.   If drainage is < 50 mls over the next 24 hrs. And Echo reviewed by Interventional Cardiology service, decision to be made for removal at that time.     Call [facility] for any concerns: # [phone]    Code Status:  Full Code    I spent 30 minutes in the professional and overall care of this patient.       LINDSEY Oh, PA-C     Associate Physician Assistant   Interventional Cardiology Service  [facility]     [address] # [phone]  Pager # [phone]  Mail Stop: [phone]  Email: [email or web address]

## 2023-11-04 ENCOUNTER — APPOINTMENT (OUTPATIENT)
Dept: CARDIOLOGY | Facility: HOSPITAL | Age: 33
DRG: 315 | End: 2023-11-04
Payer: COMMERCIAL

## 2023-11-04 LAB
ALBUMIN SERPL BCP-MCNC: 3.2 G/DL (ref 3.4–5)
ANION GAP SERPL CALC-SCNC: 16 MMOL/L (ref 10–20)
BASOPHILS # BLD AUTO: 0.04 X10*3/UL (ref 0–0.1)
BASOPHILS NFR BLD AUTO: 0.5 %
BUN SERPL-MCNC: 7 MG/DL (ref 6–23)
CALCIUM SERPL-MCNC: 8.7 MG/DL (ref 8.6–10.6)
CHLAMYDIA.PNEUMONIAE PCR, VIRC: NOT DETECTED
CHLORIDE SERPL-SCNC: 104 MMOL/L (ref 98–107)
CO2 SERPL-SCNC: 25 MMOL/L (ref 21–32)
CREAT SERPL-MCNC: 0.68 MG/DL (ref 0.5–1.3)
EJECTION FRACTION APICAL 4 CHAMBER: 47
EJECTION FRACTION: 52
EOSINOPHIL # BLD AUTO: 0.16 X10*3/UL (ref 0–0.7)
EOSINOPHIL NFR BLD AUTO: 1.9 %
ERYTHROCYTE [DISTWIDTH] IN BLOOD BY AUTOMATED COUNT: 13.2 % (ref 11.5–14.5)
GFR SERPL CREATININE-BSD FRML MDRD: >90 ML/MIN/1.73M*2
GLUCOSE SERPL-MCNC: 91 MG/DL (ref 74–99)
HCT VFR BLD AUTO: 34.7 % (ref 41–52)
HGB BLD-MCNC: 10.9 G/DL (ref 13.5–17.5)
IMM GRANULOCYTES # BLD AUTO: 0.05 X10*3/UL (ref 0–0.7)
IMM GRANULOCYTES NFR BLD AUTO: 0.6 % (ref 0–0.9)
LYMPHOCYTES # BLD AUTO: 1.36 X10*3/UL (ref 1.2–4.8)
LYMPHOCYTES NFR BLD AUTO: 15.9 %
MCH RBC QN AUTO: 27.5 PG (ref 26–34)
MCHC RBC AUTO-ENTMCNC: 31.4 G/DL (ref 32–36)
MCV RBC AUTO: 87 FL (ref 80–100)
MONOCYTES # BLD AUTO: 0.92 X10*3/UL (ref 0.1–1)
MONOCYTES NFR BLD AUTO: 10.7 %
MYCOPLASMA.PNEUMONIAE PCR, VIRC: NOT DETECTED
NEUTROPHILS # BLD AUTO: 6.03 X10*3/UL (ref 1.2–7.7)
NEUTROPHILS NFR BLD AUTO: 70.4 %
NRBC BLD-RTO: 0 /100 WBCS (ref 0–0)
PHOSPHATE SERPL-MCNC: 4.8 MG/DL (ref 2.5–4.9)
PLATELET # BLD AUTO: 386 X10*3/UL (ref 150–450)
POTASSIUM SERPL-SCNC: 4.1 MMOL/L (ref 3.5–5.3)
RBC # BLD AUTO: 3.97 X10*6/UL (ref 4.5–5.9)
RHEUMATOID FACT SER NEPH-ACNC: 12 IU/ML (ref 0–15)
SODIUM SERPL-SCNC: 141 MMOL/L (ref 136–145)
WBC # BLD AUTO: 8.6 X10*3/UL (ref 4.4–11.3)

## 2023-11-04 PROCEDURE — 2500000001 HC RX 250 WO HCPCS SELF ADMINISTERED DRUGS (ALT 637 FOR MEDICARE OP): Performed by: NURSE PRACTITIONER

## 2023-11-04 PROCEDURE — 2500000004 HC RX 250 GENERAL PHARMACY W/ HCPCS (ALT 636 FOR OP/ED): Performed by: STUDENT IN AN ORGANIZED HEALTH CARE EDUCATION/TRAINING PROGRAM

## 2023-11-04 PROCEDURE — 93321 DOPPLER ECHO F-UP/LMTD STD: CPT | Performed by: INTERNAL MEDICINE

## 2023-11-04 PROCEDURE — 86431 RHEUMATOID FACTOR QUANT: CPT | Performed by: NURSE PRACTITIONER

## 2023-11-04 PROCEDURE — 2500000001 HC RX 250 WO HCPCS SELF ADMINISTERED DRUGS (ALT 637 FOR MEDICARE OP): Performed by: STUDENT IN AN ORGANIZED HEALTH CARE EDUCATION/TRAINING PROGRAM

## 2023-11-04 PROCEDURE — 93308 TTE F-UP OR LMTD: CPT | Performed by: INTERNAL MEDICINE

## 2023-11-04 PROCEDURE — 80069 RENAL FUNCTION PANEL: CPT | Performed by: NURSE PRACTITIONER

## 2023-11-04 PROCEDURE — 1200000002 HC GENERAL ROOM WITH TELEMETRY DAILY

## 2023-11-04 PROCEDURE — 85025 COMPLETE CBC W/AUTO DIFF WBC: CPT | Performed by: NURSE PRACTITIONER

## 2023-11-04 PROCEDURE — 93308 TTE F-UP OR LMTD: CPT

## 2023-11-04 PROCEDURE — 86140 C-REACTIVE PROTEIN: CPT | Performed by: NURSE PRACTITIONER

## 2023-11-04 PROCEDURE — 36415 COLL VENOUS BLD VENIPUNCTURE: CPT | Performed by: NURSE PRACTITIONER

## 2023-11-04 PROCEDURE — 93325 DOPPLER ECHO COLOR FLOW MAPG: CPT | Performed by: INTERNAL MEDICINE

## 2023-11-04 PROCEDURE — 99233 SBSQ HOSP IP/OBS HIGH 50: CPT | Performed by: INTERNAL MEDICINE

## 2023-11-04 RX ORDER — OXYCODONE AND ACETAMINOPHEN 5; 325 MG/1; MG/1
1 TABLET ORAL EVERY 4 HOURS PRN
Status: DISCONTINUED | OUTPATIENT
Start: 2023-11-04 | End: 2023-11-07 | Stop reason: HOSPADM

## 2023-11-04 RX ORDER — ACETAMINOPHEN 160 MG/5ML
650 SOLUTION ORAL EVERY 6 HOURS
Status: DISCONTINUED | OUTPATIENT
Start: 2023-11-04 | End: 2023-11-07 | Stop reason: HOSPADM

## 2023-11-04 RX ORDER — ACETAMINOPHEN 325 MG/1
975 TABLET ORAL EVERY 6 HOURS
Status: DISCONTINUED | OUTPATIENT
Start: 2023-11-04 | End: 2023-11-07 | Stop reason: HOSPADM

## 2023-11-04 RX ORDER — ACETAMINOPHEN 650 MG/1
650 SUPPOSITORY RECTAL EVERY 6 HOURS
Status: DISCONTINUED | OUTPATIENT
Start: 2023-11-04 | End: 2023-11-07 | Stop reason: HOSPADM

## 2023-11-04 RX ORDER — METOPROLOL SUCCINATE 50 MG/1
50 TABLET, EXTENDED RELEASE ORAL DAILY
Status: DISCONTINUED | OUTPATIENT
Start: 2023-11-05 | End: 2023-11-07 | Stop reason: HOSPADM

## 2023-11-04 RX ADMIN — IBUPROFEN 600 MG: 600 TABLET ORAL at 20:55

## 2023-11-04 RX ADMIN — COLCHICINE 0.6 MG: 0.6 TABLET, FILM COATED ORAL at 08:28

## 2023-11-04 RX ADMIN — METOPROLOL TARTRATE 25 MG: 25 TABLET, FILM COATED ORAL at 08:28

## 2023-11-04 RX ADMIN — OXYCODONE HYDROCHLORIDE AND ACETAMINOPHEN 1 TABLET: 5; 325 TABLET ORAL at 23:02

## 2023-11-04 RX ADMIN — OXYCODONE HYDROCHLORIDE AND ACETAMINOPHEN 1 TABLET: 5; 325 TABLET ORAL at 00:01

## 2023-11-04 RX ADMIN — ACETAMINOPHEN 975 MG: 325 TABLET ORAL at 17:00

## 2023-11-04 RX ADMIN — OXYCODONE HYDROCHLORIDE AND ACETAMINOPHEN 1 TABLET: 5; 325 TABLET ORAL at 18:36

## 2023-11-04 RX ADMIN — METOPROLOL TARTRATE 25 MG: 25 TABLET, FILM COATED ORAL at 20:55

## 2023-11-04 RX ADMIN — PANTOPRAZOLE SODIUM 40 MG: 40 TABLET, DELAYED RELEASE ORAL at 07:11

## 2023-11-04 RX ADMIN — COLCHICINE 0.6 MG: 0.6 TABLET, FILM COATED ORAL at 20:55

## 2023-11-04 RX ADMIN — IBUPROFEN 600 MG: 600 TABLET ORAL at 09:00

## 2023-11-04 RX ADMIN — IBUPROFEN 600 MG: 600 TABLET ORAL at 14:32

## 2023-11-04 ASSESSMENT — COGNITIVE AND FUNCTIONAL STATUS - GENERAL
DAILY ACTIVITIY SCORE: 24
DAILY ACTIVITIY SCORE: 24
MOBILITY SCORE: 24
MOBILITY SCORE: 24

## 2023-11-04 ASSESSMENT — PAIN SCALES - GENERAL
PAINLEVEL_OUTOF10: 0 - NO PAIN
PAINLEVEL_OUTOF10: 8
PAINLEVEL_OUTOF10: 7
PAINLEVEL_OUTOF10: 4
PAINLEVEL_OUTOF10: 7
PAINLEVEL_OUTOF10: 0 - NO PAIN

## 2023-11-04 ASSESSMENT — PAIN - FUNCTIONAL ASSESSMENT
PAIN_FUNCTIONAL_ASSESSMENT: 0-10

## 2023-11-04 NOTE — CARE PLAN
The patient's goals for the shift include to ambulate as tolerated.     The clinical goals for the shift include Patient will remain HD stable and safe this shift.

## 2023-11-04 NOTE — CARE PLAN
Problem: Fall/Injury  Goal: Be free from injury by end of the shift  Outcome: Progressing     Problem: Skin  Goal: Prevent/manage excess moisture  Outcome: Progressing     Problem: Pain  Goal: Walks with improved pain control throughout the shift  Outcome: Progressing      The clinical goals for the shift include Pt will remain HD stable this shift.      11/04/23 at 4:51 AM - Inés Mahajan RN

## 2023-11-05 LAB
ACE SERPL-CCNC: 24 U/L (ref 16–85)
ADENOVIRUS RVP, VIRC: NOT DETECTED
BACTERIA BLD CULT: NORMAL
BACTERIA BLD CULT: NORMAL
BASOPHILS # BLD AUTO: 0.06 X10*3/UL (ref 0–0.1)
BASOPHILS NFR BLD AUTO: 0.8 %
ENTEROVIRUS/RHINOVIRUS RVP, VIRC: NOT DETECTED
EOSINOPHIL # BLD AUTO: 0.22 X10*3/UL (ref 0–0.7)
EOSINOPHIL NFR BLD AUTO: 2.8 %
ERYTHROCYTE [DISTWIDTH] IN BLOOD BY AUTOMATED COUNT: 13.3 % (ref 11.5–14.5)
HCT VFR BLD AUTO: 35 % (ref 41–52)
HGB BLD-MCNC: 11.1 G/DL (ref 13.5–17.5)
HUMAN BOCAVIRUS RVP, VIRC: NOT DETECTED
HUMAN CORONAVIRUS RVP, VIRC: NOT DETECTED
IMM GRANULOCYTES # BLD AUTO: 0.04 X10*3/UL (ref 0–0.7)
IMM GRANULOCYTES NFR BLD AUTO: 0.5 % (ref 0–0.9)
INFLUENZA A , VIRC: NOT DETECTED
INFLUENZA A H1N1-09 , VIRC: NOT DETECTED
INFLUENZA B PCR, VIRC: NOT DETECTED
LYMPHOCYTES # BLD AUTO: 1.29 X10*3/UL (ref 1.2–4.8)
LYMPHOCYTES NFR BLD AUTO: 16.2 %
MCH RBC QN AUTO: 27.7 PG (ref 26–34)
MCHC RBC AUTO-ENTMCNC: 31.7 G/DL (ref 32–36)
MCV RBC AUTO: 87 FL (ref 80–100)
METAPNEUMOVIRUS , VIRC: NOT DETECTED
MONOCYTES # BLD AUTO: 0.72 X10*3/UL (ref 0.1–1)
MONOCYTES NFR BLD AUTO: 9 %
NEUTROPHILS # BLD AUTO: 5.65 X10*3/UL (ref 1.2–7.7)
NEUTROPHILS NFR BLD AUTO: 70.7 %
NRBC BLD-RTO: 0 /100 WBCS (ref 0–0)
PARAINFLUENZA PCR, VIRC: NOT DETECTED
PLATELET # BLD AUTO: 484 X10*3/UL (ref 150–450)
RBC # BLD AUTO: 4.01 X10*6/UL (ref 4.5–5.9)
RSV PCR, RVP, VIRC: NOT DETECTED
WBC # BLD AUTO: 8 X10*3/UL (ref 4.4–11.3)

## 2023-11-05 PROCEDURE — 2500000004 HC RX 250 GENERAL PHARMACY W/ HCPCS (ALT 636 FOR OP/ED): Performed by: STUDENT IN AN ORGANIZED HEALTH CARE EDUCATION/TRAINING PROGRAM

## 2023-11-05 PROCEDURE — 85025 COMPLETE CBC W/AUTO DIFF WBC: CPT | Performed by: NURSE PRACTITIONER

## 2023-11-05 PROCEDURE — 36415 COLL VENOUS BLD VENIPUNCTURE: CPT

## 2023-11-05 PROCEDURE — 36415 COLL VENOUS BLD VENIPUNCTURE: CPT | Performed by: NURSE PRACTITIONER

## 2023-11-05 PROCEDURE — 86036 ANCA SCREEN EACH ANTIBODY: CPT

## 2023-11-05 PROCEDURE — 1200000002 HC GENERAL ROOM WITH TELEMETRY DAILY

## 2023-11-05 PROCEDURE — 2500000001 HC RX 250 WO HCPCS SELF ADMINISTERED DRUGS (ALT 637 FOR MEDICARE OP): Performed by: STUDENT IN AN ORGANIZED HEALTH CARE EDUCATION/TRAINING PROGRAM

## 2023-11-05 PROCEDURE — 99233 SBSQ HOSP IP/OBS HIGH 50: CPT | Performed by: INTERNAL MEDICINE

## 2023-11-05 PROCEDURE — 2500000001 HC RX 250 WO HCPCS SELF ADMINISTERED DRUGS (ALT 637 FOR MEDICARE OP): Performed by: NURSE PRACTITIONER

## 2023-11-05 RX ADMIN — OXYCODONE HYDROCHLORIDE AND ACETAMINOPHEN 1 TABLET: 5; 325 TABLET ORAL at 13:27

## 2023-11-05 RX ADMIN — PANTOPRAZOLE SODIUM 40 MG: 40 TABLET, DELAYED RELEASE ORAL at 06:15

## 2023-11-05 RX ADMIN — METOPROLOL SUCCINATE 50 MG: 50 TABLET, EXTENDED RELEASE ORAL at 09:05

## 2023-11-05 RX ADMIN — ACETAMINOPHEN 975 MG: 325 TABLET ORAL at 06:15

## 2023-11-05 RX ADMIN — OXYCODONE HYDROCHLORIDE AND ACETAMINOPHEN 1 TABLET: 5; 325 TABLET ORAL at 09:21

## 2023-11-05 RX ADMIN — OXYCODONE HYDROCHLORIDE AND ACETAMINOPHEN 1 TABLET: 5; 325 TABLET ORAL at 20:06

## 2023-11-05 RX ADMIN — IBUPROFEN 600 MG: 600 TABLET ORAL at 16:04

## 2023-11-05 RX ADMIN — IBUPROFEN 600 MG: 600 TABLET ORAL at 09:05

## 2023-11-05 RX ADMIN — IBUPROFEN 600 MG: 600 TABLET ORAL at 20:06

## 2023-11-05 RX ADMIN — COLCHICINE 0.6 MG: 0.6 TABLET, FILM COATED ORAL at 20:07

## 2023-11-05 RX ADMIN — COLCHICINE 0.6 MG: 0.6 TABLET, FILM COATED ORAL at 09:05

## 2023-11-05 ASSESSMENT — COGNITIVE AND FUNCTIONAL STATUS - GENERAL
DAILY ACTIVITIY SCORE: 24
DAILY ACTIVITIY SCORE: 24
MOBILITY SCORE: 24
MOBILITY SCORE: 24

## 2023-11-05 ASSESSMENT — PAIN SCALES - GENERAL
PAINLEVEL_OUTOF10: 8
PAINLEVEL_OUTOF10: 7
PAINLEVEL_OUTOF10: 2
PAINLEVEL_OUTOF10: 1
PAINLEVEL_OUTOF10: 0 - NO PAIN
PAINLEVEL_OUTOF10: 0 - NO PAIN
PAINLEVEL_OUTOF10: 8
PAINLEVEL_OUTOF10: 0 - NO PAIN

## 2023-11-05 ASSESSMENT — PAIN - FUNCTIONAL ASSESSMENT
PAIN_FUNCTIONAL_ASSESSMENT: 0-10

## 2023-11-05 NOTE — CARE PLAN
The patient's goals for the shift include to increase ambulation and activity.     The clinical goals for the shift include to have pericardial drain removed.     Patient safe and stable today, afebrile. Pain improved. Pending parvovirus results and possible discharge Tuesday.

## 2023-11-05 NOTE — PROGRESS NOTES
11/05/23        Transitional Care Coordination Progress Note:   Patient discussed during interdisciplinary rounds.   Team members present: RN PARADISE POLANCO   Plan per Medical/Surgical team: cold-like illness in c/b pericarditis.  due to fevers, night sweats, and chest discomfort  Discharge disposition: Home   Status-Inpatient   Payer- Payor: [employer] [place] / Plan: [employer] / Product Type: *No Product type* /    Potential Barriers: None   ADOD: 11.8.2023        Charlie Fontana RN [facility] [phone]

## 2023-11-05 NOTE — PROGRESS NOTES
"Subjective Data:  \"This is the best I have felt\" still has fevers and ibuprofen breaks fevers. Walking in room a bit, gets SOB with activity.    - IC contacted to consider pulling drain since echo 11/4 shows effusion is loculated septation and not able to drain. Removed by Dr. Rich, CMR ordered for tomorrow to reassess pleural and pericardial effusion and to r/o effusive pericarditis   - Rheumatoid factor 12 (neg) and ANCA pending  - increase activity        Overnight Events:    None      Objective Data:  Last Recorded Vitals:  Vitals:    11/05/23 0505 11/05/23 0651 11/05/23 0900 11/05/23 0921   BP: 135/87  152/80    Pulse: 81  83    Resp: 18  18    Temp: 36.7 °C (98.1 °F)  36.3 °C (97.4 °F) (!) 9 °C (48.2 °F)   TempSrc:       SpO2: 94%  94%    Weight:  107 kg (235 lb 7.2 oz)     Height:           Last Labs:  CBC - 11/5/2023:  8:15 AM  8.0 11.1 484    35.0      CMP - 11/4/2023:  6:12 AM  8.7 6.3 17 --- 0.6   4.8 3.2 30 69      PTT - No results in last year.  _   _ _     TROPHS   Date/Time Value Ref Range Status   10/31/2023 05:50 PM <3 0 - 20 ng/L Final   10/24/2023 08:04 PM 55 0 - 20 ng/L Final     Comment:     Previous result verified on 10/24/2023 1138 on specimen/case [id number] called with component [facility] for procedure Troponin I, High Sensitivity with value 84 ng/L.   10/24/2023 04:22 PM 46 0 - 20 ng/L Final     LDLCALC   Date/Time Value Ref Range Status   10/24/2023 04:22 PM 86 <=99 mg/dL Final     Comment:                                 Near   Borderline      AGE      Desirable  Optimal    High     High     Very High     0-19 Y     0 - 109     ---    110-129   >/= 130     ----    20-24 Y     0 - 119     ---    120-159   >/= 160     ----      >24 Y     0 -  99   100-129  130-159   160-189     >/=190       VLDL   Date/Time Value Ref Range Status   10/24/2023 04:22 PM 15 0 - 40 mg/dL Final      Last I/O:  I/O last 3 completed shifts:  In: 360 (3.4 mL/kg) [P.O.:360]  Out: 975 (9.1 mL/kg) [Urine:975 (0.3 " mL/kg/hr)]  Weight: 106.8 kg     Past Cardiology Tests (Last 3 Years):  EKG:  ECG 12 Lead 10/26/2023      ECG 12 Lead 10/26/2023    Echo:  Transthoracic echo (TTE) limited 11/1/2023      Transthoracic Echo (TTE) Complete 10/25/2023    Ejection Fractions:  EF   Date/Time Value Ref Range Status   11/04/2023 01:13 PM 52       Cath:  No results found for this or any previous visit from the past 1095 days.    Stress Test:  No results found for this or any previous visit from the past 1095 days.    Cardiac Imaging:  No results found for this or any previous visit from the past 1095 days.      Inpatient Medications:  Scheduled medications   Medication Dose Route Frequency    acetaminophen  975 mg oral q6h    Or    acetaminophen  650 mg nasogastric tube q6h    Or    acetaminophen  650 mg rectal q6h    colchicine (gout)  0.6 mg oral BID    ibuprofen  600 mg oral TID    metoprolol succinate XL  50 mg oral Daily    pantoprazole  40 mg oral Daily before breakfast     PRN medications   Medication    oxyCODONE-acetaminophen     Continuous Medications   Medication Dose Last Rate       Physical Exam:  GEN: laying in bed, NAD  HEENT: unable to appreciate JVD   Skin: warm and clammy   CV: RRR  Pulm: decreased left, CTA other lobes  Abdomen: soft, NT  Ext: no LE edema   Neuro: A/Ox3  Psych: appropriate     Assessment/Plan   Pt is a 34 yo previously healthy gentleman who developed a cold-like illness in October of 2023 c/b pericarditis. He is admitted a second time due to fevers, night sweats, and chest discomfort. Clinically suspect his symptoms are related to pericarditis that is ongoing    Pericarditis, continued episode  - Initially diagnosed 10/24-10/26/23 at [facility] where echo 10/25 completed showed an ejection fraction of 50 to 55% with moderate to severe pericardial effusion. Treated with PPI, colchicine and ibuprofen.   - Back to ED 10/31/23 for L shoulder pain, left chest discomfort,  and fever X2 days.   - Echo 10/31  showed large pericardial effusion  - cont colchicine 0.6 BID, ibuprofen 600 TID, metop tartrate  - protonix for GI protection  - may need to consider steroids should pain recur, balanced with risk of recurrence  - viral panel negative thus far, CTA PE without e/o of pulmonary embolism  - s/p periocardiocentesis 11/2 for ~350cc, drain in place   - pericardial fluid labs neg for nmalignancy or infection   - repeat limited TTE 11/4: CONCLUSIONS: 1. Left ventricular systolic function is normal with a 55% estimated ejection fraction.  2. There is a moderate pericardial effusion.  3. There is a moderate pleural effusion.  4. There is no evidence of cardiac tamponade. Continue draining.   - IC contacted to consider pulling drain since echo 11/4 shows effusion is loculated septation and not able to drain. Drain pulled 11/5  - CMR tomorrow to r/o effusive pericarditis   - Increase tylenol to 975mg q6 to help with pain of drain  - rheum consulted     Paroxysmal atrial fibrillation   - Initially diagnosed afib RVR (up to 179bpm) 10/24/23 at time of pericarditis, spontaneously converted to SR  - 11/4 change metoprolol tartrate to succinate for patient convenience  - currently NSR   - [id number] score of 0  - TSH normal   - plan on short course of BB since afib likely occured in setting of pericarditis    Leukocytosis   - X-ray 10/31 some pulm bibasilar atelectasis/pulmonary edema with questionable left lower lobe infiltrate, CT angiography showed questionable lower lobe infiltrate, therefore added doxycycline to vancomycin/Zosyn for atypical coverage, did also obtain urine Legionella/pneumococcus antigen testing.   - stopped doxycycline at  as his legionella and strep was negative; unlikely an atypical pneumonia  - admit WBC 13,000  - Troponin negative, low suspicion for myocarditis   - bld cx x2 10/31 NGTD x2  - 11/3 temp 38 @ 2000, then again 0400 treated with tylenol   - 11/4 tmax 100.4  - 11/5 tmax 99.3  - MRSA  negative  - Infectious disease consult, labs pending       Dispo  pending eval of pericardial effusion/pericarditis   Reach out to ID and rheum Monday for DC plans (plan for DC Tuesday)      Seen and discussed with Dr. Juarez       Code Status:  Full Code    I spent 40 minutes in the professional and overall care of this patient.        Nubia Washington, APRN-CNP

## 2023-11-05 NOTE — CARE PLAN
The patient's goals for the shift include pull heart drain    The clinical goals for the shift include Patient will remaine HDS throughout shift    Patient remained HDS throughout the shift. He was medicated with Percocet for pain. Pericardial drain output <5ml.

## 2023-11-06 ENCOUNTER — APPOINTMENT (OUTPATIENT)
Dept: RADIOLOGY | Facility: HOSPITAL | Age: 33
DRG: 315 | End: 2023-11-06
Payer: COMMERCIAL

## 2023-11-06 LAB
ADENOVIRUS RVP, VIRC: NOT DETECTED
ANA SER QL HEP2 SUBST: NEGATIVE
BASOPHILS # BLD AUTO: 0.06 X10*3/UL (ref 0–0.1)
BASOPHILS NFR BLD AUTO: 0.7 %
ENTEROVIRUS/RHINOVIRUS RVP, VIRC: NOT DETECTED
EOSINOPHIL # BLD AUTO: 0.23 X10*3/UL (ref 0–0.7)
EOSINOPHIL NFR BLD AUTO: 2.7 %
ERYTHROCYTE [DISTWIDTH] IN BLOOD BY AUTOMATED COUNT: 13.4 % (ref 11.5–14.5)
H CAPSUL AG UR QL: NOT DETECTED
HCT VFR BLD AUTO: 35.5 % (ref 41–52)
HGB BLD-MCNC: 11.1 G/DL (ref 13.5–17.5)
HUMAN BOCAVIRUS RVP, VIRC: NOT DETECTED
HUMAN CORONAVIRUS RVP, VIRC: NOT DETECTED
IMM GRANULOCYTES # BLD AUTO: 0.05 X10*3/UL (ref 0–0.7)
IMM GRANULOCYTES NFR BLD AUTO: 0.6 % (ref 0–0.9)
INFLUENZA A , VIRC: NOT DETECTED
INFLUENZA A H1N1-09 , VIRC: NOT DETECTED
INFLUENZA B PCR, VIRC: NOT DETECTED
LYMPHOCYTES # BLD AUTO: 1.5 X10*3/UL (ref 1.2–4.8)
LYMPHOCYTES NFR BLD AUTO: 17.8 %
MCH RBC QN AUTO: 27.6 PG (ref 26–34)
MCHC RBC AUTO-ENTMCNC: 31.3 G/DL (ref 32–36)
MCV RBC AUTO: 88 FL (ref 80–100)
METAPNEUMOVIRUS , VIRC: NOT DETECTED
MONOCYTES # BLD AUTO: 0.66 X10*3/UL (ref 0.1–1)
MONOCYTES NFR BLD AUTO: 7.8 %
NEUTROPHILS # BLD AUTO: 5.93 X10*3/UL (ref 1.2–7.7)
NEUTROPHILS NFR BLD AUTO: 70.4 %
NRBC BLD-RTO: 0 /100 WBCS (ref 0–0)
PARAINFLUENZA PCR, VIRC: NOT DETECTED
PLATELET # BLD AUTO: 537 X10*3/UL (ref 150–450)
RBC # BLD AUTO: 4.02 X10*6/UL (ref 4.5–5.9)
RSV PCR, RVP, VIRC: NOT DETECTED
SCAN RESULT: NORMAL
SCAN RESULT: NORMAL
WBC # BLD AUTO: 8.4 X10*3/UL (ref 4.4–11.3)

## 2023-11-06 PROCEDURE — 75561 CARDIAC MRI FOR MORPH W/DYE: CPT

## 2023-11-06 PROCEDURE — 75561 CARDIAC MRI FOR MORPH W/DYE: CPT | Performed by: INTERNAL MEDICINE

## 2023-11-06 PROCEDURE — 36415 COLL VENOUS BLD VENIPUNCTURE: CPT | Performed by: NURSE PRACTITIONER

## 2023-11-06 PROCEDURE — 2500000001 HC RX 250 WO HCPCS SELF ADMINISTERED DRUGS (ALT 637 FOR MEDICARE OP): Performed by: STUDENT IN AN ORGANIZED HEALTH CARE EDUCATION/TRAINING PROGRAM

## 2023-11-06 PROCEDURE — 2550000001 HC RX 255 CONTRASTS: Performed by: NURSE PRACTITIONER

## 2023-11-06 PROCEDURE — 2500000004 HC RX 250 GENERAL PHARMACY W/ HCPCS (ALT 636 FOR OP/ED): Performed by: STUDENT IN AN ORGANIZED HEALTH CARE EDUCATION/TRAINING PROGRAM

## 2023-11-06 PROCEDURE — 75565 CARD MRI VELOC FLOW MAPPING: CPT | Performed by: INTERNAL MEDICINE

## 2023-11-06 PROCEDURE — 2500000001 HC RX 250 WO HCPCS SELF ADMINISTERED DRUGS (ALT 637 FOR MEDICARE OP): Performed by: NURSE PRACTITIONER

## 2023-11-06 PROCEDURE — A9575 INJ GADOTERATE MEGLUMI 0.1ML: HCPCS | Performed by: NURSE PRACTITIONER

## 2023-11-06 PROCEDURE — 99233 SBSQ HOSP IP/OBS HIGH 50: CPT | Performed by: INTERNAL MEDICINE

## 2023-11-06 PROCEDURE — 85025 COMPLETE CBC W/AUTO DIFF WBC: CPT | Performed by: NURSE PRACTITIONER

## 2023-11-06 PROCEDURE — 1200000002 HC GENERAL ROOM WITH TELEMETRY DAILY

## 2023-11-06 RX ORDER — GADOTERATE MEGLUMINE 376.9 MG/ML
40 INJECTION INTRAVENOUS
Status: COMPLETED | OUTPATIENT
Start: 2023-11-06 | End: 2023-11-06

## 2023-11-06 RX ADMIN — PANTOPRAZOLE SODIUM 40 MG: 40 TABLET, DELAYED RELEASE ORAL at 06:36

## 2023-11-06 RX ADMIN — IBUPROFEN 600 MG: 600 TABLET ORAL at 08:25

## 2023-11-06 RX ADMIN — GADOTERATE MEGLUMINE 40 ML: 376.9 INJECTION INTRAVENOUS at 13:43

## 2023-11-06 RX ADMIN — ACETAMINOPHEN 975 MG: 325 TABLET ORAL at 06:36

## 2023-11-06 RX ADMIN — METOPROLOL SUCCINATE 50 MG: 50 TABLET, EXTENDED RELEASE ORAL at 08:25

## 2023-11-06 RX ADMIN — IBUPROFEN 600 MG: 600 TABLET ORAL at 20:46

## 2023-11-06 RX ADMIN — IBUPROFEN 600 MG: 600 TABLET ORAL at 15:00

## 2023-11-06 RX ADMIN — OXYCODONE HYDROCHLORIDE AND ACETAMINOPHEN 1 TABLET: 5; 325 TABLET ORAL at 20:46

## 2023-11-06 RX ADMIN — COLCHICINE 0.6 MG: 0.6 TABLET, FILM COATED ORAL at 20:46

## 2023-11-06 RX ADMIN — COLCHICINE 0.6 MG: 0.6 TABLET, FILM COATED ORAL at 08:26

## 2023-11-06 ASSESSMENT — COGNITIVE AND FUNCTIONAL STATUS - GENERAL
MOBILITY SCORE: 24
DAILY ACTIVITIY SCORE: 24

## 2023-11-06 ASSESSMENT — PAIN - FUNCTIONAL ASSESSMENT: PAIN_FUNCTIONAL_ASSESSMENT: 0-10

## 2023-11-06 ASSESSMENT — PAIN SCALES - GENERAL: PAINLEVEL_OUTOF10: 0 - NO PAIN

## 2023-11-06 NOTE — CARE PLAN
Patient is doing well.  Remains afebrile.  I discussed lab work with the patient.  So far infectious work-up is negative.  Some of the viral serologic testing is still pending.  Pericardial drain has been removed.  No antibiotics at this time.  Advised the patient to come back to emergency room or call cardiology if he has worsening shortness of breath or chest pain after discharge from hospital.  I also informed the patient that pericardial fungal and mycobacterial cultures will take 2 to 4 weeks to be resulted and if anything results positive I will call him.  ID will sign off

## 2023-11-06 NOTE — PROGRESS NOTES
"Jonah Britton is a 33 y.o. male on day 5 of admission presenting with Pericarditis associated with atrial septal defect.    Subjective   Dr Matos and patient and family discussed concerns of acute pericarditis and new resolved a-fib - answered all questions and concerns   Walking in hallway   Feeling better since drain removed  Aware of cMRI today - will await results   Tele reviewed no a-fib noted     Objective     Physical Exam  Constitutional:       Appearance: Normal appearance.   HENT:      Mouth/Throat:      Mouth: Mucous membranes are moist.   Cardiovascular:      Rate and Rhythm: Normal rate and regular rhythm.   Pulmonary:      Effort: Pulmonary effort is normal.      Comments: Diminished bases bilaterally   Musculoskeletal:         General: Normal range of motion.   Skin:     General: Skin is warm and dry.   Neurological:      Mental Status: He is alert.   Psychiatric:         Mood and Affect: Mood normal.         Behavior: Behavior normal.         Last Recorded Vitals  Blood pressure 129/75, pulse 78, temperature 36.7 °C (98.1 °F), temperature source Temporal, resp. rate 18, height 1.956 m (6' 5.01\"), weight 106 kg (233 lb 14.5 oz), SpO2 94 %.  Intake/Output last 3 Shifts:  I/O last 3 completed shifts:  In: 720 (6.8 mL/kg) [P.O.:720]  Out: 975 (9.2 mL/kg) [Urine:975 (0.3 mL/kg/hr)]  Weight: 106.1 kg     Relevant Results  Scheduled medications  acetaminophen, 975 mg, oral, q6h   Or  acetaminophen, 650 mg, nasogastric tube, q6h   Or  acetaminophen, 650 mg, rectal, q6h  colchicine, 0.6 mg, oral, BID  ibuprofen, 600 mg, oral, TID  metoprolol succinate XL, 50 mg, oral, Daily  pantoprazole, 40 mg, oral, Daily before breakfast      Continuous medications     PRN medications  PRN medications: oxyCODONE-acetaminophen     Results for orders placed or performed during the hospital encounter of 11/01/23 (from the past 24 hour(s))   CBC and Auto Differential   Result Value Ref Range    WBC 8.4 4.4 - 11.3 x10*3/uL    " nRBC 0.0 0.0 - 0.0 /100 WBCs    RBC 4.02 (L) 4.50 - 5.90 x10*6/uL    Hemoglobin 11.1 (L) 13.5 - 17.5 g/dL    Hematocrit 35.5 (L) 41.0 - 52.0 %    MCV 88 80 - 100 fL    MCH 27.6 26.0 - 34.0 pg    MCHC 31.3 (L) 32.0 - 36.0 g/dL    RDW 13.4 11.5 - 14.5 %    Platelets 537 (H) 150 - 450 x10*3/uL    Neutrophils % 70.4 40.0 - 80.0 %    Immature Granulocytes %, Automated 0.6 0.0 - 0.9 %    Lymphocytes % 17.8 13.0 - 44.0 %    Monocytes % 7.8 2.0 - 10.0 %    Eosinophils % 2.7 0.0 - 6.0 %    Basophils % 0.7 0.0 - 2.0 %    Neutrophils Absolute 5.93 1.20 - 7.70 x10*3/uL    Immature Granulocytes Absolute, Automated 0.05 0.00 - 0.70 x10*3/uL    Lymphocytes Absolute 1.50 1.20 - 4.80 x10*3/uL    Monocytes Absolute 0.66 0.10 - 1.00 x10*3/uL    Eosinophils Absolute 0.23 0.00 - 0.70 x10*3/uL    Basophils Absolute 0.06 0.00 - 0.10 x10*3/uL        Assessment/Plan   Active Problems:    Acute idiopathic pericarditis    Pericardial effusion    Pt is a 34 yo previously healthy gentleman who developed a cold-like illness in October of 2023 c/b pericarditis. He is admitted a second time due to fevers, night sweats, and chest discomfort. Clinically suspect his symptoms are related to pericarditis that is ongoing     Pericarditis, continued episode  - Initially diagnosed 10/24-10/26/23 at [facility] where echo 10/25 completed showed an ejection fraction of 50 to 55% with moderate to severe pericardial effusion. Treated with PPI, colchicine and ibuprofen.   - Back to ED 10/31/23 for L shoulder pain, left chest discomfort,  and fever X2 days.   - Echo 10/31 showed large pericardial effusion  - cont colchicine 0.6 BID, ibuprofen 600 TID, metop tartrate  - protonix for GI protection  - may need to consider steroids should pain recur, balanced with risk of recurrence  - viral panel negative thus far, CTA PE without e/o of pulmonary embolism  - s/p periocardiocentesis 11/2 for ~350cc, drain in place   - pericardial fluid labs neg for nmalignancy or  infection   - repeat limited TTE 11/4: CONCLUSIONS: 1. Left ventricular systolic function is normal with a 55% estimated ejection fraction.  2. There is a moderate pericardial effusion.  3. There is a moderate pleural effusion.  4. There is no evidence of cardiac tamponade. Continue draining.   - 11/5 IC contacted and pulled  drain since echo 11/4 shows effusion is loculated septation and not able to drain. Drain pulled 11/5  -11/6  CMR today to r/o effusive pericarditis   - Increase tylenol to 975mg q6 to help with pain of drain  - rheum consulted     Paroxysmal atrial fibrillation   - Initially diagnosed afib RVR (up to 179bpm) 10/24/23 at time of pericarditis, spontaneously converted to SR  - 11/4 change metoprolol tartrate to succinate for patient convenience  - currently NSR   - HMBNU3pgoc score of 0  - TSH normal   - plan on short course of BB since afib likely occured in setting of pericarditis       Leukocytosis   - X-ray 10/31 some pulm bibasilar atelectasis/pulmonary edema with questionable left lower lobe infiltrate, CT angiography showed questionable lower lobe infiltrate, therefore added doxycycline to vancomycin/Zosyn for atypical coverage, did also obtain urine Legionella/pneumococcus antigen testing.   - stopped doxycycline at  as his legionella and strep was negative; unlikely an atypical pneumonia  - admit WBC 13,000  - Troponin negative, low suspicion for myocarditis   - bld cx x2 10/31 NGTD x2  - 11/3 temp 38 @ 2000, then again 0400 treated with tylenol   - 11/4 tmax 100.4  - 11/5 tmax 99.3  - 11/6 afebrile last 24 hours   - MRSA negative  - Infectious disease consult, labs pending         Dispo  pending eval of pericardial effusion/pericarditis   ID and rheum to determine any further recommendations will comment       Britt Robert, APRN-CNP

## 2023-11-06 NOTE — CARE PLAN
The patient's goals for the shift include pull heart drain    The clinical goals for the shift include Pt crys reman afeberile thoughout the shift    Pt remained HDS. He was medicated with X1 dose of Percocet. Pt had >6hrs uninterrupted sleep

## 2023-11-06 NOTE — CARE PLAN
The patient's goals for the shift include to increase activity and ambulation.    The clinical goals for the shift include patient to remain afebrile and complete cardiac MRI today.     Patient was safe and stable today. Ambulated multiple times with improved pain control throughout shift. Cardiac MRI completed today. Possible discharge tomorrow.

## 2023-11-07 ENCOUNTER — PHARMACY VISIT (OUTPATIENT)
Dept: PHARMACY | Facility: CLINIC | Age: 33
End: 2023-11-07
Payer: COMMERCIAL

## 2023-11-07 ENCOUNTER — APPOINTMENT (OUTPATIENT)
Dept: CARDIOLOGY | Facility: HOSPITAL | Age: 33
DRG: 315 | End: 2023-11-07
Payer: COMMERCIAL

## 2023-11-07 VITALS
OXYGEN SATURATION: 96 % | HEIGHT: 77 IN | BODY MASS INDEX: 27.33 KG/M2 | WEIGHT: 231.48 LBS | HEART RATE: 89 BPM | DIASTOLIC BLOOD PRESSURE: 81 MMHG | TEMPERATURE: 97.8 F | SYSTOLIC BLOOD PRESSURE: 145 MMHG | RESPIRATION RATE: 20 BRPM

## 2023-11-07 LAB
ALBUMIN SERPL BCP-MCNC: 3.6 G/DL (ref 3.4–5)
ALP SERPL-CCNC: 84 U/L (ref 33–120)
ALT SERPL W P-5'-P-CCNC: 77 U/L (ref 10–52)
ANCA AB PATTERN SER IF-IMP: NORMAL
ANCA IGG TITR SER IF: NORMAL {TITER}
ANION GAP SERPL CALC-SCNC: 16 MMOL/L (ref 10–20)
AST SERPL W P-5'-P-CCNC: 54 U/L (ref 9–39)
BACTERIA FLD CULT: NORMAL
BASOPHILS # BLD AUTO: 0.06 X10*3/UL (ref 0–0.1)
BASOPHILS NFR BLD AUTO: 0.7 %
BILIRUB SERPL-MCNC: 0.4 MG/DL (ref 0–1.2)
BUN SERPL-MCNC: 10 MG/DL (ref 6–23)
CALCIUM SERPL-MCNC: 8.9 MG/DL (ref 8.6–10.6)
CHLORIDE SERPL-SCNC: 101 MMOL/L (ref 98–107)
CO2 SERPL-SCNC: 28 MMOL/L (ref 21–32)
CREAT SERPL-MCNC: 0.7 MG/DL (ref 0.5–1.3)
CRP SERPL-MCNC: 11.66 MG/DL
CRP SERPL-MCNC: 24.11 MG/DL
EJECTION FRACTION APICAL 4 CHAMBER: 60.5
EJECTION FRACTION: 55
EOSINOPHIL # BLD AUTO: 0.28 X10*3/UL (ref 0–0.7)
EOSINOPHIL NFR BLD AUTO: 3.4 %
ERYTHROCYTE [DISTWIDTH] IN BLOOD BY AUTOMATED COUNT: 13.3 % (ref 11.5–14.5)
ERYTHROCYTE [SEDIMENTATION RATE] IN BLOOD BY WESTERGREN METHOD: 93 MM/H (ref 0–15)
GFR SERPL CREATININE-BSD FRML MDRD: >90 ML/MIN/1.73M*2
GLUCOSE SERPL-MCNC: 78 MG/DL (ref 74–99)
GRAM STN SPEC: NORMAL
GRAM STN SPEC: NORMAL
HCT VFR BLD AUTO: 36.1 % (ref 41–52)
HGB BLD-MCNC: 11.5 G/DL (ref 13.5–17.5)
IMM GRANULOCYTES # BLD AUTO: 0.08 X10*3/UL (ref 0–0.7)
IMM GRANULOCYTES NFR BLD AUTO: 1 % (ref 0–0.9)
LEFT VENTRICLE INTERNAL DIMENSION DIASTOLE: 5.4 (ref 3.5–6)
LYMPHOCYTES # BLD AUTO: 1.24 X10*3/UL (ref 1.2–4.8)
LYMPHOCYTES NFR BLD AUTO: 15.1 %
MCH RBC QN AUTO: 27.7 PG (ref 26–34)
MCHC RBC AUTO-ENTMCNC: 31.9 G/DL (ref 32–36)
MCV RBC AUTO: 87 FL (ref 80–100)
MITRAL VALVE E/A RATIO: 2
MONOCYTES # BLD AUTO: 0.74 X10*3/UL (ref 0.1–1)
MONOCYTES NFR BLD AUTO: 9 %
NEUTROPHILS # BLD AUTO: 5.82 X10*3/UL (ref 1.2–7.7)
NEUTROPHILS NFR BLD AUTO: 70.8 %
NRBC BLD-RTO: 0 /100 WBCS (ref 0–0)
PLATELET # BLD AUTO: 514 X10*3/UL (ref 150–450)
POTASSIUM SERPL-SCNC: 4.7 MMOL/L (ref 3.5–5.3)
PROT SERPL-MCNC: 6.3 G/DL (ref 6.4–8.2)
RBC # BLD AUTO: 4.15 X10*6/UL (ref 4.5–5.9)
SODIUM SERPL-SCNC: 140 MMOL/L (ref 136–145)
WBC # BLD AUTO: 8.2 X10*3/UL (ref 4.4–11.3)

## 2023-11-07 PROCEDURE — 93308 TTE F-UP OR LMTD: CPT | Performed by: INTERNAL MEDICINE

## 2023-11-07 PROCEDURE — 80053 COMPREHEN METABOLIC PANEL: CPT | Performed by: NURSE PRACTITIONER

## 2023-11-07 PROCEDURE — 36415 COLL VENOUS BLD VENIPUNCTURE: CPT | Performed by: NURSE PRACTITIONER

## 2023-11-07 PROCEDURE — 85025 COMPLETE CBC W/AUTO DIFF WBC: CPT | Performed by: NURSE PRACTITIONER

## 2023-11-07 PROCEDURE — 2500000001 HC RX 250 WO HCPCS SELF ADMINISTERED DRUGS (ALT 637 FOR MEDICARE OP): Performed by: STUDENT IN AN ORGANIZED HEALTH CARE EDUCATION/TRAINING PROGRAM

## 2023-11-07 PROCEDURE — 93308 TTE F-UP OR LMTD: CPT

## 2023-11-07 PROCEDURE — 2500000004 HC RX 250 GENERAL PHARMACY W/ HCPCS (ALT 636 FOR OP/ED): Performed by: STUDENT IN AN ORGANIZED HEALTH CARE EDUCATION/TRAINING PROGRAM

## 2023-11-07 PROCEDURE — 99239 HOSP IP/OBS DSCHRG MGMT >30: CPT | Performed by: INTERNAL MEDICINE

## 2023-11-07 PROCEDURE — 93321 DOPPLER ECHO F-UP/LMTD STD: CPT | Performed by: INTERNAL MEDICINE

## 2023-11-07 PROCEDURE — 86140 C-REACTIVE PROTEIN: CPT | Performed by: INTERNAL MEDICINE

## 2023-11-07 PROCEDURE — RXMED WILLOW AMBULATORY MEDICATION CHARGE

## 2023-11-07 PROCEDURE — 93325 DOPPLER ECHO COLOR FLOW MAPG: CPT | Performed by: INTERNAL MEDICINE

## 2023-11-07 PROCEDURE — 85652 RBC SED RATE AUTOMATED: CPT | Performed by: NURSE PRACTITIONER

## 2023-11-07 PROCEDURE — 2500000001 HC RX 250 WO HCPCS SELF ADMINISTERED DRUGS (ALT 637 FOR MEDICARE OP): Performed by: NURSE PRACTITIONER

## 2023-11-07 RX ORDER — COLCHICINE 0.6 MG/1
0.6 TABLET ORAL 2 TIMES DAILY
Qty: 60 TABLET | Refills: 2 | Status: SHIPPED | OUTPATIENT
Start: 2023-11-07 | End: 2024-04-19

## 2023-11-07 RX ORDER — METOPROLOL SUCCINATE 50 MG/1
50 TABLET, EXTENDED RELEASE ORAL DAILY
Qty: 30 TABLET | Refills: 2 | Status: SHIPPED | OUTPATIENT
Start: 2023-11-08 | End: 2024-01-11 | Stop reason: WASHOUT

## 2023-11-07 RX ORDER — IBUPROFEN 600 MG/1
600 TABLET ORAL EVERY 12 HOURS
Qty: 60 TABLET | Refills: 0 | Status: SHIPPED | OUTPATIENT
Start: 2023-11-07 | End: 2024-01-03

## 2023-11-07 RX ORDER — PANTOPRAZOLE SODIUM 40 MG/1
40 TABLET, DELAYED RELEASE ORAL
Qty: 30 TABLET | Refills: 0 | Status: SHIPPED | OUTPATIENT
Start: 2023-11-08 | End: 2024-01-02 | Stop reason: SDUPTHER

## 2023-11-07 RX ORDER — IBUPROFEN 600 MG/1
600 TABLET ORAL 2 TIMES DAILY
Status: DISCONTINUED | OUTPATIENT
Start: 2023-11-07 | End: 2023-11-07 | Stop reason: HOSPADM

## 2023-11-07 RX ADMIN — PANTOPRAZOLE SODIUM 40 MG: 40 TABLET, DELAYED RELEASE ORAL at 09:02

## 2023-11-07 RX ADMIN — METOPROLOL SUCCINATE 50 MG: 50 TABLET, EXTENDED RELEASE ORAL at 09:02

## 2023-11-07 RX ADMIN — COLCHICINE 0.6 MG: 0.6 TABLET, FILM COATED ORAL at 09:02

## 2023-11-07 RX ADMIN — IBUPROFEN 600 MG: 600 TABLET ORAL at 09:02

## 2023-11-07 SDOH — ECONOMIC STABILITY: INCOME INSECURITY: IN THE PAST 12 MONTHS, HAS THE ELECTRIC, GAS, OIL, OR WATER COMPANY THREATENED TO SHUT OFF SERVICE IN YOUR HOME?: NO

## 2023-11-07 SDOH — HEALTH STABILITY: MENTAL HEALTH: HOW OFTEN DO YOU HAVE A DRINK CONTAINING ALCOHOL?: 2-3 TIMES A WEEK

## 2023-11-07 SDOH — HEALTH STABILITY: MENTAL HEALTH: HOW OFTEN DO YOU HAVE 6 OR MORE DRINKS ON ONE OCCASION?: MONTHLY

## 2023-11-07 SDOH — HEALTH STABILITY: MENTAL HEALTH: HOW MANY STANDARD DRINKS CONTAINING ALCOHOL DO YOU HAVE ON A TYPICAL DAY?: 3 OR 4

## 2023-11-07 ASSESSMENT — COGNITIVE AND FUNCTIONAL STATUS - GENERAL
MOBILITY SCORE: 24
DAILY ACTIVITIY SCORE: 24
DAILY ACTIVITIY SCORE: 24
MOBILITY SCORE: 24
PATIENT BASELINE BEDBOUND: NO

## 2023-11-07 ASSESSMENT — PAIN - FUNCTIONAL ASSESSMENT: PAIN_FUNCTIONAL_ASSESSMENT: 0-10

## 2023-11-07 ASSESSMENT — ACTIVITIES OF DAILY LIVING (ADL): LACK_OF_TRANSPORTATION: NO

## 2023-11-07 ASSESSMENT — LIFESTYLE VARIABLES
SKIP TO QUESTIONS 9-10: 0
AUDIT-C TOTAL SCORE: 6

## 2023-11-07 ASSESSMENT — PAIN SCALES - GENERAL: PAINLEVEL_OUTOF10: 0 - NO PAIN

## 2023-11-07 NOTE — PROGRESS NOTES
Transitional Care Coordination Progress Note:  Patient discussed during interdisciplinary rounds.   Team members present: Kyle Rodriguez RN TCC, HVI, Nurse Manager  Plan per Medical/Surgical team: ID signed off, drain removed, CMR to r/o effusive pericarditis, pain control, Rheumatology consult  [place] [facility] [place]  Status: [place]  Discharge disposition: Home no needs  Potential Barriers: None  ADOD: 11/07 vs 11/08  Kyle Rodriguez RN Transitional Care Coordinator [phone]

## 2023-11-07 NOTE — DISCHARGE SUMMARY
"Discharge Diagnosis  Pericarditis associated with atrial septal defect    Issues Requiring Follow-Up  cMRI with c/f constriction. Repeat TTE in 2 weeks. Cont to trend ESR/CRP. ID to follow up on pending labs.     Test Results Pending At Discharge  Pending Labs       Order Current Status    Non-gynecologic cytology Collected (11/02/23 3229)    Anti-neutrophilic cytoplasmic antibody In process    Coxsackie Group A Abs, IgG In process    Coxsackie Group B Abs In process    Echovirus Grp Abs In process    Parvovirus B19 antibody, IgG and IgM In process    Sodium, Fluid In process    AFB Culture/Smear Preliminary result    Fungal Culture/Smear Preliminary result            Hospital Course  Jonah Britton is a 33 y.o. male presenting with pericarditis with chest pain, fever.     Patient is a 33-year-old gentleman with a history of pericarditis that was discharged on 10/26.  Mainly his symptoms started in early October with a \"chest cold\" associated with SOB and feeling fatigued. He felt transiently better but then worse again culminating to admission on discharge on 10/26 where he was dx with pericarditis. He was given ibuprofen/colchicine/metoprolol.  At the time he had atrial fibrillation as well.     After that discharge, he returned to the hospital due to 2 to 3 days of worsening pain over the left lateral chest and back.  He also had fevers and trouble sleeping at night as well as constitutional symptoms. No weight loss otherwise.     He had serial echocardiograms that were done recently.  On 10/30 his most recent echo showed a moderate to large pericardial effusion without any evidence of cardiac tamponade.  His EF is 50%.     His troponin is negative but he had an elevated D-dimer to 664.  CTA PE was performed which did not show pulmonary embolism.  He was also negative for flu, coronavirus, and his urine antigen for strep was negative, and Legionella urine antigen was negative.  Labs also noted for leukocytosis of " 13.3.  While he was at outside hospital ED he was started on broad-spectrum antibiotics including doxycycline, vancomycin, Zosyn.  He was also given Tylenol/Toradol and fluids with improvement of his symptoms.  He was then transferred to [facility] for further management.    Floor Course  Patient underwent pericardiocentesis 11/2 with 350cc removed. A drain remained in place, due to decreased output was removed 11/5. Limited echo 11/4 showed EF 55%, moderate pericardial effusion and moderate pleural effusion, no evidence of cardiac tamponade. The pleural effusion was loculated and septated and therefore could not be drained further. cMRI completed 11/6 suggestive of effusive constrictive pericarditis, results discussed with Dr. Saenz. Echo repeated on day of discharge, effusion unchanged and limited evidence for constriction. Plan to repeat echo in 1-2 weeks.     ID consulted and cytology sent showing no evidence of malignancy or infection. Patient had leukocytosis with WBC 13,000 and intermittent fevers (expected with pericarditis). Doxycycline stopped since legionella and strep negative. Blood cultures negative. Infectious work up negative. He continued his treatment of pericarditis with colchicine, ibuprofen and protonix. Afebrile with WBC 8.2 on day of discharge. Repeat ESR 93 (30) and CRP 11.66 (24.11). Discussed with Dr. Khan who recommended the patient report to the ED for any recurring fever.     Patient remained in SR throughout hospital admission. Due to CHADS2 vasc score of 0, no AC was needed. He was rate controlled with metoprolol succinate. Plan for short course of BB since afib occurred in the setting of pericarditis.     Discharge wt 105kg    After all labs and VS were reviewed the decision was made that the patient was medically stable for discharge.  The patient was discharged in satisfactory condition.    More than 30 minutes were spent in coordinating patient discharge.     Pertinent Physical  Exam At Time of Discharge  Physical Exam    Home Medications     Medication List      START taking these medications     colchicine 0.6 mg tablet; Take 1 tablet (0.6 mg) by mouth 2 times a day.   ibuprofen 600 mg tablet; Take 1 tablet (600 mg) by mouth every 12 hours.   metoprolol succinate XL 50 mg 24 hr tablet; Commonly known as:   Toprol-XL; Take 1 tablet (50 mg) by mouth once daily. Do not crush or   chew. Do not start before November 8, 2023.; Start taking on: November 8, 2023   pantoprazole 40 mg EC tablet; Commonly known as: ProtoNix; Take 1 tablet   (40 mg) by mouth once daily in the morning. Take before meals. Do not   crush, chew, or split. Do not start before November 8, 2023.; Start taking   on: November 8, 2023       Outpatient Follow-Up  Future Appointments   Date Time Provider Department Center   11/14/2023 11:00 AM Israel Saenz MD [id number]       LORENA Restrepo-CNP

## 2023-11-07 NOTE — DISCHARGE INSTRUCTIONS
You are being treated for pericarditis. You will take colchicine for three months as well as ibuprofen until your follow up with Dr. Saenz. Pantoprazole is used to protect your stomach while you are taking ibuprofen. Metoprolol is to control your heart rate due to atrial fibrillation.     Dr. Khan with Infectious Disease will follow up on any labs that are still pending and will call you if any come back positive.     Please report to the ED for any recurring fever or worsening shortness of breath.     It was a pleasure to care for you!

## 2023-11-07 NOTE — CARE PLAN
The patient's goals for the shift include no drainage at drain site.    The clinical goals for the shift include Patient will have Echo today and have no drainage from Drain removal this shift    Over the shift, the patient had no drainage from drain site and had bedside Echo, patient will be discharged today, awaiting meds to beds.  Patient remained safe free from falls and injury this shift.

## 2023-11-08 LAB
CV A16 IGG TITR SER IF: ABNORMAL TITER
CV A24 IGG TITR SER IF: ABNORMAL TITER
CV A7 IGG TITR SER IF: ABNORMAL TITER
CV A9 IGG TITR SER IF: ABNORMAL TITER
CV B1 AB TITR SER CF: NEGATIVE {TITER}
CV B2 AB TITR SER CF: NEGATIVE {TITER}
CV B3 AB TITR SER CF: NORMAL {TITER}
CV B4 AB TITR SER CF: NEGATIVE {TITER}
CV B5 AB TITR SER CF: NEGATIVE {TITER}
CV B6 AB TITR SER CF: NEGATIVE {TITER}

## 2023-11-12 PROBLEM — S39.012A STRAIN OF LUMBAR PARASPINAL MUSCLE, INITIAL ENCOUNTER: Status: ACTIVE | Noted: 2023-11-12

## 2023-11-12 PROBLEM — M62.9 HAMSTRING TIGHTNESS OF BOTH LOWER EXTREMITIES: Status: ACTIVE | Noted: 2023-11-12

## 2023-11-12 RX ORDER — METHYLPREDNISOLONE 4 MG/1
TABLET ORAL
COMMUNITY
Start: 2023-01-03 | End: 2023-11-17 | Stop reason: WASHOUT

## 2023-11-12 RX ORDER — METHOCARBAMOL 750 MG/1
750 TABLET, FILM COATED ORAL 3 TIMES DAILY
COMMUNITY
Start: 2023-01-03 | End: 2023-11-17 | Stop reason: WASHOUT

## 2023-11-13 LAB
ECV11 NAB TITR SER NT: NORMAL {TITER}
ECV30 NAB TITR SER NT: NORMAL {TITER}
ECV6 NAB TITR SER NT: NORMAL {TITER}
ECV7 NAB TITR SER NT: NORMAL {TITER}
ECV9 NAB TITR SER NT: NORMAL {TITER}

## 2023-11-14 ENCOUNTER — OFFICE VISIT (OUTPATIENT)
Dept: CARDIOLOGY | Facility: HOSPITAL | Age: 33
End: 2023-11-14
Payer: COMMERCIAL

## 2023-11-14 VITALS
SYSTOLIC BLOOD PRESSURE: 122 MMHG | HEART RATE: 61 BPM | BODY MASS INDEX: 26.72 KG/M2 | DIASTOLIC BLOOD PRESSURE: 70 MMHG | WEIGHT: 224 LBS | OXYGEN SATURATION: 97 %

## 2023-11-14 DIAGNOSIS — I30.0 ACUTE IDIOPATHIC PERICARDITIS (HHS-HCC): Primary | ICD-10-CM

## 2023-11-14 LAB
B19V IGG SER IA-ACNC: 0.16 IV
B19V IGM SER IA-ACNC: 0.23 IV

## 2023-11-14 PROCEDURE — 93010 ELECTROCARDIOGRAM REPORT: CPT | Performed by: INTERNAL MEDICINE

## 2023-11-14 PROCEDURE — 93005 ELECTROCARDIOGRAM TRACING: CPT | Performed by: INTERNAL MEDICINE

## 2023-11-14 PROCEDURE — 1036F TOBACCO NON-USER: CPT | Performed by: INTERNAL MEDICINE

## 2023-11-14 PROCEDURE — 99214 OFFICE O/P EST MOD 30 MIN: CPT | Performed by: INTERNAL MEDICINE

## 2023-11-14 PROCEDURE — 99214 OFFICE O/P EST MOD 30 MIN: CPT | Mod: 25 | Performed by: INTERNAL MEDICINE

## 2023-11-14 ASSESSMENT — COLUMBIA-SUICIDE SEVERITY RATING SCALE - C-SSRS
1. IN THE PAST MONTH, HAVE YOU WISHED YOU WERE DEAD OR WISHED YOU COULD GO TO SLEEP AND NOT WAKE UP?: NO
6. HAVE YOU EVER DONE ANYTHING, STARTED TO DO ANYTHING, OR PREPARED TO DO ANYTHING TO END YOUR LIFE?: NO
2. HAVE YOU ACTUALLY HAD ANY THOUGHTS OF KILLING YOURSELF?: NO

## 2023-11-14 ASSESSMENT — ENCOUNTER SYMPTOMS
DEPRESSION: 0
OCCASIONAL FEELINGS OF UNSTEADINESS: 0
LOSS OF SENSATION IN FEET: 0

## 2023-11-14 ASSESSMENT — PATIENT HEALTH QUESTIONNAIRE - PHQ9
1. LITTLE INTEREST OR PLEASURE IN DOING THINGS: NOT AT ALL
SUM OF ALL RESPONSES TO PHQ9 QUESTIONS 1 AND 2: 0
2. FEELING DOWN, DEPRESSED OR HOPELESS: NOT AT ALL

## 2023-11-14 ASSESSMENT — PAIN SCALES - GENERAL: PAINLEVEL: 0-NO PAIN

## 2023-11-14 NOTE — PROGRESS NOTES
Subjective   Jonah Britton is a 33 y.o. male for review of pericarditis and paroxysmal atrial fibrillation.    Investigations:  Imaging:  TTE (10/25/2023) - LVEF 50-55%, RV systolic dysfunction, moderate-large pericardial effusion, no evidence of tamponade.  TTE (10/31/2023) - LVEF 50%, moderate-large pericardial effusion, no evidence of tamponade.   Pericardiocentesis (11/2/2023) - 340 ml drained, brown fluid.  TTE (11/2/2023) - LVEF 60-65%, post 350ml drainage, residual large pericardial loculation adjacent to the lateral wall with fibrinous stranding.   TTE (11/4/2023) - LVEF 55%, moderate pericardial effusion, no evidence of tamponade, left sided pleural effusion.   TTE (11/7/2023) - LVEF 55%, septal bounce, moderate pericardial effusion, complex/organized appearing posteriorly, no MV inflow variation, no RA/RV collapse, dilated IVC (2.4 cm) with >50% insp collapse.   CPTE (10/31/2023) - no PE, posterior bibasal airspace opacities, large pericardial effusion.   CMR (11/6/2023) - LVEF 55%, normal myocardial T2 values (no evidence of myocarditis), no significant myocardial LGE, thickened pericardium with elevated STIR signal and LGE suggestive of pericarditis, moderate-large pericardial perfusion with fibrinous material, mild respirophasic septal flattening suggestive of possible constrictive physiology. No RV/RA collapse. Small bilateral pleural effusions.     Labs:  CRP - 7.76 (10/25), 33.13 (11/3). 24.11 (11/4), 11.66 (11/7).   ESR - 30 (11/3), 93 (11/7).   hsTrop - 84 (10/24), 59 (10/24), <3 (10/31).   Cr 0.86.   WCC - 7.7 (10/24), 13.3 (10/31), 8.2 (11/7).  Hb - 14.3 (10/24), 11.5 (11/7)  ALT - 18 (10/24), 77 (11/7)  AST - 16 (10/24), 54 (11/7)  Albumin 3.6 (11/7)  ANCA neg, RF neg, SUE neg, C3 207, C4 44, Hep B Sag -ve, HepC Ab -ve, Hep B Sab reactive.  Coxsackie A (sero 16, 7, 9, 24) - 1:800, NEGATIVES - Coxsackie B, HIV, Flu A/B, adenovirus, enterovirus, COVID, bocavirus, metapneumovirus, RSV,  parainfluenza.     Pericardial fluid: RBC 133K, , WBC 3096, negative cytology.     Chief Complaint:  Pericarditis, night sweats.     HPI  Initial presentation with pericarditis 10/24. Symptoms were URTI, shortness of breath and fatigue, pleuritic chest discomfort. TTE at the time showed LVEF 50-55% with moderate-large pericardial effusion w/o tamponade, for trial medical therapy. At time of presentation he also had atrial fibrillation.   Commenced on therapy with ibuprofen, colchicine and metoprolol.  Discharged from hospital. Spontaneous reversion to SR. Subsequent readmission due to recurrent pain of the left chest and back. On 10/31 had echocardiogram that showed moderate-large pericardial effusion without evidence of tamponade.  LVEF 50%.  Troponins negative.  CTA negative for PE.  Coxsackie A serology positive, Coxsackie B negative. Other pertinent negatives were negative flu, coronavirus strep.   Underwent pericardiocentesis on 11/2. Moderate loculated residual effusion on removal of the drain without evidence of tamponade. CMR showed evidence of pericarditis with mild respirophasic septal possibly c/w effusive constrictive pericarditis. There was no evidence of myocarditis on CMR.  Chest pain had resolved and inflammatory markers improving, discharged on colchicine 0.6 mg bid, ibuprofen 600mg bid, pantoprazole 40mg daily.     Progress  - first night - had a temp 100 degrees.  - subsequent nights night sweats, no fever.   - no pleuritic pain.   - no shortness of breath.   - walking 2 miles a day no issue.   - no ankle swelling.   - no palpitations.  - no diarrhea on colchicine.       ROS  A 10-system review was performed and was unremarkable apart from what is presented in the HPI.     Objective   Physical Exam  Alert and orientated.   Appropriate responses, normal affect.  No respiratory distress at rest.   Skin warm and dry.   Normal radial pulse character and volume. Clinically SR.   Anicteric sclera,  no conjunctival pallor.   No JVD or carotid bruits.  Heart sounds dual, no added heart sounds or audible murmurs. No audible rub.   Chest clear on auscultation.   Calves soft, non-tender.  No pedal edema.  EKG - NSR, non-specific T wave abnormality. No sig ST changes or VT depression.     Lab Review:   Lab Results   Component Value Date     11/07/2023    K 4.7 11/07/2023     11/07/2023    CO2 28 11/07/2023    BUN 10 11/07/2023    CREATININE 0.70 11/07/2023    GLUCOSE 78 11/07/2023    CALCIUM 8.9 11/07/2023     Lab Results   Component Value Date    WBC 8.2 11/07/2023    HGB 11.5 (L) 11/07/2023    HCT 36.1 (L) 11/07/2023    MCV 87 11/07/2023     (H) 11/07/2023       Assessment/Plan   In summary, Mr Britton is a 34 yo man presenting for cardiology review of pericarditis and paroxysmal atrial fibrillation.  He was diagnosed with pericarditis in late October following a preceding upper respiratory tract infection.  He had no significant PMH prior to this event. At the time of presentation he was also noted to be in atrial fibrillation that subsequently reverted spontaneously.  His initial echocardiogram showed normal left ventricular systolic function with a moderate-large pericardial effusion but no evidence of tamponade.  He was commenced on medical therapy with colchicine and ibuprofen but was readmitted to hospital with persistent symptoms of chest/back pain.  He subsequently underwent pericardiocentesis with partial resolution of his loculated pericardial effusion.  His cardiac MRI showed evidence of active pericarditis, a moderate-large pericardial effusion with mild respirophasic septal shift suggestive of possible effusive-constrictive pericarditis.  There was no evidence of myocarditis on CMR.  His autoimmune panel and pericardial cytology were negative.  As his inflammatory markers were downtrending during the admission with resolution of his chest pain he was discharged on  colchicine/ibuprofen.  In clinic today he reports ongoing absence of chest pain however he has experienced some night sweats.  He has been able to walk for 1-2 miles without issue.  On examination his blood pressure was 122/70 mmHg and his heart rate was 61 bpm.  His heart sounds were dual with no audible pericardial rub.  There was no JVD and his EKG showed sinus rhythm without ST changes.  In light of his night sweats I have arranged for repeat lab tests including inflammatory markers.  If there is evidence of residual inflammation, he will be for consideration of transition from NSAIDs to prednisone.  I have also arranged for a repeat transthoracic echocardiogram to monitor his pericardial effusion.  I will follow-up with Jonah after his investigations.

## 2023-11-14 NOTE — PATIENT INSTRUCTIONS
Thank you for attending the cardiology clinic at [facility] today. It was nice to meet you.     Your cardiovascular examination today was satisfactory and your EKG showed a normal heart rhythm.     Continue your current medications.     Please have blood collected to assess your inflammatory markers.     I have ordered a repeat echocardiogram to be completed in 1-2 weeks. Please call [phone] to schedule this test.    You may continue your current walking routine for now. We will decide on higher levels of exercise after your echocardiogram.     I will follow-up with you in clinic in 1-month to assess your progress.

## 2023-11-17 ENCOUNTER — HOSPITAL ENCOUNTER (OUTPATIENT)
Facility: HOSPITAL | Age: 33
Setting detail: OBSERVATION
LOS: 2 days | Discharge: HOME | End: 2023-11-21
Attending: EMERGENCY MEDICINE | Admitting: INTERNAL MEDICINE
Payer: COMMERCIAL

## 2023-11-17 ENCOUNTER — HOSPITAL ENCOUNTER (OUTPATIENT)
Dept: CARDIOLOGY | Facility: HOSPITAL | Age: 33
Discharge: HOME | End: 2023-11-17

## 2023-11-17 ENCOUNTER — APPOINTMENT (OUTPATIENT)
Dept: RADIOLOGY | Facility: HOSPITAL | Age: 33
End: 2023-11-17
Payer: COMMERCIAL

## 2023-11-17 DIAGNOSIS — R00.0 TACHYCARDIA: ICD-10-CM

## 2023-11-17 DIAGNOSIS — R50.9 FEVER, UNSPECIFIED FEVER CAUSE: Primary | ICD-10-CM

## 2023-11-17 DIAGNOSIS — Q21.10: ICD-10-CM

## 2023-11-17 DIAGNOSIS — I31.39 PERICARDIAL EFFUSION (HHS-HCC): ICD-10-CM

## 2023-11-17 DIAGNOSIS — I32: ICD-10-CM

## 2023-11-17 DIAGNOSIS — I30.0 ACUTE IDIOPATHIC PERICARDITIS (HHS-HCC): ICD-10-CM

## 2023-11-17 LAB
ALBUMIN SERPL BCP-MCNC: 4.6 G/DL (ref 3.4–5)
ALP SERPL-CCNC: 91 U/L (ref 33–120)
ALT SERPL W P-5'-P-CCNC: 29 U/L (ref 10–52)
ANION GAP BLDV CALCULATED.4IONS-SCNC: 11 MMOL/L (ref 10–25)
ANION GAP SERPL CALC-SCNC: 15 MMOL/L (ref 10–20)
AST SERPL W P-5'-P-CCNC: 13 U/L (ref 9–39)
BASE EXCESS BLDV CALC-SCNC: 2.6 MMOL/L (ref -2–3)
BASOPHILS # BLD AUTO: 0.07 X10*3/UL (ref 0–0.1)
BASOPHILS NFR BLD AUTO: 0.4 %
BILIRUB SERPL-MCNC: 0.7 MG/DL (ref 0–1.2)
BNP SERPL-MCNC: 53 PG/ML (ref 0–99)
BODY TEMPERATURE: 37 DEGREES CELSIUS
BUN SERPL-MCNC: 13 MG/DL (ref 6–23)
CA-I BLDV-SCNC: 1.14 MMOL/L (ref 1.1–1.33)
CALCIUM SERPL-MCNC: 9.7 MG/DL (ref 8.6–10.6)
CARDIAC TROPONIN I PNL SERPL HS: 6 NG/L (ref 0–53)
CHLORIDE BLDV-SCNC: 99 MMOL/L (ref 98–107)
CHLORIDE SERPL-SCNC: 95 MMOL/L (ref 98–107)
CO2 SERPL-SCNC: 28 MMOL/L (ref 21–32)
CREAT SERPL-MCNC: 0.9 MG/DL (ref 0.5–1.3)
CRP SERPL-MCNC: 26.26 MG/DL
EOSINOPHIL # BLD AUTO: 0.08 X10*3/UL (ref 0–0.7)
EOSINOPHIL NFR BLD AUTO: 0.5 %
ERYTHROCYTE [DISTWIDTH] IN BLOOD BY AUTOMATED COUNT: 12.6 % (ref 11.5–14.5)
ERYTHROCYTE [SEDIMENTATION RATE] IN BLOOD BY WESTERGREN METHOD: 43 MM/H (ref 0–15)
FLUAV RNA RESP QL NAA+PROBE: NOT DETECTED
FLUBV RNA RESP QL NAA+PROBE: NOT DETECTED
GFR SERPL CREATININE-BSD FRML MDRD: >90 ML/MIN/1.73M*2
GLUCOSE BLDV-MCNC: 123 MG/DL (ref 74–99)
GLUCOSE SERPL-MCNC: 103 MG/DL (ref 74–99)
HCO3 BLDV-SCNC: 27.2 MMOL/L (ref 22–26)
HCT VFR BLD AUTO: 41 % (ref 41–52)
HCT VFR BLD EST: 37 % (ref 41–52)
HGB BLD-MCNC: 13 G/DL (ref 13.5–17.5)
HGB BLDV-MCNC: 12.4 G/DL (ref 13.5–17.5)
HOLD SPECIMEN: NORMAL
IMM GRANULOCYTES # BLD AUTO: 0.09 X10*3/UL (ref 0–0.7)
IMM GRANULOCYTES NFR BLD AUTO: 0.5 % (ref 0–0.9)
INHALED O2 CONCENTRATION: 21 %
LACTATE BLDV-SCNC: 1 MMOL/L (ref 0.4–2)
LYMPHOCYTES # BLD AUTO: 1.73 X10*3/UL (ref 1.2–4.8)
LYMPHOCYTES NFR BLD AUTO: 9.9 %
MCH RBC QN AUTO: 26.7 PG (ref 26–34)
MCHC RBC AUTO-ENTMCNC: 31.7 G/DL (ref 32–36)
MCV RBC AUTO: 84 FL (ref 80–100)
MONOCYTES # BLD AUTO: 1.3 X10*3/UL (ref 0.1–1)
MONOCYTES NFR BLD AUTO: 7.5 %
NEUTROPHILS # BLD AUTO: 14.14 X10*3/UL (ref 1.2–7.7)
NEUTROPHILS NFR BLD AUTO: 81.2 %
NRBC BLD-RTO: 0 /100 WBCS (ref 0–0)
OXYHGB MFR BLDV: 57.3 % (ref 45–75)
PCO2 BLDV: 41 MM HG (ref 41–51)
PH BLDV: 7.43 PH (ref 7.33–7.43)
PLATELET # BLD AUTO: 581 X10*3/UL (ref 150–450)
PO2 BLDV: 35 MM HG (ref 35–45)
POTASSIUM BLDV-SCNC: 4 MMOL/L (ref 3.5–5.3)
POTASSIUM SERPL-SCNC: 4.1 MMOL/L (ref 3.5–5.3)
PROT SERPL-MCNC: 7.9 G/DL (ref 6.4–8.2)
RBC # BLD AUTO: 4.87 X10*6/UL (ref 4.5–5.9)
SAO2 % BLDV: 58 % (ref 45–75)
SARS-COV-2 RNA RESP QL NAA+PROBE: NOT DETECTED
SODIUM BLDV-SCNC: 133 MMOL/L (ref 136–145)
SODIUM SERPL-SCNC: 134 MMOL/L (ref 136–145)
WBC # BLD AUTO: 17.4 X10*3/UL (ref 4.4–11.3)

## 2023-11-17 PROCEDURE — 2500000001 HC RX 250 WO HCPCS SELF ADMINISTERED DRUGS (ALT 637 FOR MEDICARE OP)

## 2023-11-17 PROCEDURE — 71045 X-RAY EXAM CHEST 1 VIEW: CPT | Mod: FR

## 2023-11-17 PROCEDURE — G0378 HOSPITAL OBSERVATION PER HR: HCPCS

## 2023-11-17 PROCEDURE — 87040 BLOOD CULTURE FOR BACTERIA: CPT | Performed by: EMERGENCY MEDICINE

## 2023-11-17 PROCEDURE — 85652 RBC SED RATE AUTOMATED: CPT | Performed by: EMERGENCY MEDICINE

## 2023-11-17 PROCEDURE — 87533 HHV-6 DNA QUANT: CPT | Performed by: EMERGENCY MEDICINE

## 2023-11-17 PROCEDURE — 89240 UNLISTED MISC PATH TEST: CPT | Performed by: INTERNAL MEDICINE

## 2023-11-17 PROCEDURE — 93308 TTE F-UP OR LMTD: CPT

## 2023-11-17 PROCEDURE — 84132 ASSAY OF SERUM POTASSIUM: CPT | Performed by: EMERGENCY MEDICINE

## 2023-11-17 PROCEDURE — 84484 ASSAY OF TROPONIN QUANT: CPT | Performed by: EMERGENCY MEDICINE

## 2023-11-17 PROCEDURE — 36415 COLL VENOUS BLD VENIPUNCTURE: CPT | Performed by: EMERGENCY MEDICINE

## 2023-11-17 PROCEDURE — 85025 COMPLETE CBC W/AUTO DIFF WBC: CPT | Performed by: EMERGENCY MEDICINE

## 2023-11-17 PROCEDURE — 93308 TTE F-UP OR LMTD: CPT | Performed by: EMERGENCY MEDICINE

## 2023-11-17 PROCEDURE — 86665 EPSTEIN-BARR CAPSID VCA: CPT | Performed by: EMERGENCY MEDICINE

## 2023-11-17 PROCEDURE — 71045 X-RAY EXAM CHEST 1 VIEW: CPT | Mod: FOREIGN READ | Performed by: RADIOLOGY

## 2023-11-17 PROCEDURE — 93005 ELECTROCARDIOGRAM TRACING: CPT

## 2023-11-17 PROCEDURE — 83880 ASSAY OF NATRIURETIC PEPTIDE: CPT | Performed by: EMERGENCY MEDICINE

## 2023-11-17 PROCEDURE — 99223 1ST HOSP IP/OBS HIGH 75: CPT

## 2023-11-17 PROCEDURE — 86140 C-REACTIVE PROTEIN: CPT | Performed by: EMERGENCY MEDICINE

## 2023-11-17 PROCEDURE — 99285 EMERGENCY DEPT VISIT HI MDM: CPT | Mod: 25 | Performed by: EMERGENCY MEDICINE

## 2023-11-17 PROCEDURE — 93010 ELECTROCARDIOGRAM REPORT: CPT | Performed by: EMERGENCY MEDICINE

## 2023-11-17 PROCEDURE — 87529 HSV DNA AMP PROBE: CPT | Performed by: EMERGENCY MEDICINE

## 2023-11-17 PROCEDURE — 87799 DETECT AGENT NOS DNA QUANT: CPT | Performed by: EMERGENCY MEDICINE

## 2023-11-17 PROCEDURE — 87799 DETECT AGENT NOS DNA QUANT: CPT | Performed by: INTERNAL MEDICINE

## 2023-11-17 PROCEDURE — 87636 SARSCOV2 & INF A&B AMP PRB: CPT | Performed by: EMERGENCY MEDICINE

## 2023-11-17 PROCEDURE — 99285 EMERGENCY DEPT VISIT HI MDM: CPT | Performed by: EMERGENCY MEDICINE

## 2023-11-17 RX ORDER — COLCHICINE 0.6 MG/1
0.6 TABLET ORAL 2 TIMES DAILY
Status: DISCONTINUED | OUTPATIENT
Start: 2023-11-17 | End: 2023-11-21 | Stop reason: HOSPADM

## 2023-11-17 RX ORDER — IBUPROFEN 400 MG/1
600 TABLET ORAL EVERY 12 HOURS
Status: DISCONTINUED | OUTPATIENT
Start: 2023-11-17 | End: 2023-11-21 | Stop reason: HOSPADM

## 2023-11-17 RX ORDER — PANTOPRAZOLE SODIUM 40 MG/1
40 TABLET, DELAYED RELEASE ORAL
Status: DISCONTINUED | OUTPATIENT
Start: 2023-11-18 | End: 2023-11-21 | Stop reason: HOSPADM

## 2023-11-17 RX ORDER — METOPROLOL SUCCINATE 50 MG/1
50 TABLET, EXTENDED RELEASE ORAL DAILY
Status: DISCONTINUED | OUTPATIENT
Start: 2023-11-17 | End: 2023-11-21 | Stop reason: HOSPADM

## 2023-11-17 RX ORDER — SODIUM CHLORIDE 0.9 % (FLUSH) 0.9 %
3 SYRINGE (ML) INJECTION AS NEEDED
Status: DISCONTINUED | OUTPATIENT
Start: 2023-11-17 | End: 2023-11-21 | Stop reason: HOSPADM

## 2023-11-17 RX ORDER — ACETAMINOPHEN 325 MG/1
650 TABLET ORAL EVERY 4 HOURS PRN
Status: DISCONTINUED | OUTPATIENT
Start: 2023-11-17 | End: 2023-11-18

## 2023-11-17 RX ORDER — TALC
3 POWDER (GRAM) TOPICAL NIGHTLY PRN
Status: DISCONTINUED | OUTPATIENT
Start: 2023-11-17 | End: 2023-11-21 | Stop reason: HOSPADM

## 2023-11-17 RX ADMIN — IBUPROFEN 600 MG: 600 TABLET ORAL at 16:00

## 2023-11-17 RX ADMIN — Medication 3 MG: at 20:33

## 2023-11-17 RX ADMIN — COLCHICINE 0.6 MG: 0.6 TABLET ORAL at 20:32

## 2023-11-17 ASSESSMENT — PAIN SCALES - GENERAL: PAINLEVEL_OUTOF10: 0 - NO PAIN

## 2023-11-17 ASSESSMENT — COLUMBIA-SUICIDE SEVERITY RATING SCALE - C-SSRS
1. IN THE PAST MONTH, HAVE YOU WISHED YOU WERE DEAD OR WISHED YOU COULD GO TO SLEEP AND NOT WAKE UP?: NO
2. HAVE YOU ACTUALLY HAD ANY THOUGHTS OF KILLING YOURSELF?: NO
6. HAVE YOU EVER DONE ANYTHING, STARTED TO DO ANYTHING, OR PREPARED TO DO ANYTHING TO END YOUR LIFE?: NO

## 2023-11-17 ASSESSMENT — PAIN - FUNCTIONAL ASSESSMENT: PAIN_FUNCTIONAL_ASSESSMENT: 0-10

## 2023-11-17 NOTE — SIGNIFICANT EVENT
Mr. Jonah Britton is a 34 yo M with PMHx of pericarditis and pAF, who presents to  ED with recurrent fevers.    Pt states his symptoms seem to begin in October after he had a URI. He was initially seen in the  ED on 10/24 with fevers, diaphoresis and pleuritic chest pain. At time of presentation he was tachycardic up to 151 and EKG showing afib with RVR. An echo at that time showed an EF of 50-55% with mod-severe pericardial effusion. Cardiology was consulted, and the pt was started on colchicine and ibuprofen for pericarditis, and he was started on metoprolol tart 25 BID for rate control. He was discharged with f/u with Cardiology in 1 week.    5 days after discharge, however, pt represented to the ED with continued chest pain and fever. Troponin was negative but he had an elevated D-dimer to 664. CTA chest at that time was negative for PE. He had a pericardiocentesis on 11/2 with 350cc removed. The effusion was loculated and septated and could not be drained further. 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    ID was consulted and cytology was sent showing no evidence of malignancy or infection. Pt with leukocytosis of 13k, and periodic fevers. Initially placed on doxycycline for possible PNA, however was d/c. He was discharged and instructed to report back to the ED with any recurrence of fever.    Pt remained in SR throughout hospital admission. Due to his [id number] of 0, he was not started on anticoagulation.     Since that time, pt reports he initially seemed to improve, however he noticed worsening symptoms that started 2 days ago. Earlier this week he was able to walk approximately 2 miles, however beginning on 11/15PM he began noticing worsening fevers, initially starting at 5pm and then moving up earlier in the day, as well as muscle aches and drenching night sweats.     He states that he feels OK currently, however this morning he notes he had a fever up to 101.5.    He also  notes he feels “uncomfortable” lying flat, and has been using 2 pillows. Denies any LE edema.     ED Course:  VS: T 36.8  /70 HR 93 RR 16 O2 98 on RA    Labs:  CBC WBC 17.4 Hb 13.0 Plt 581  RFP Na 134 K 4.1 Cl 95 HCO3 28 BUN 13 SCr 0.9 Glc 103 Ca 9.7  LFTs AST/ALT 29/13 Alk Phos 91 T Bili 0.7 Alb 4.6 TProt 7.9  COVID Negative  Troponin: 6  BNP: 53  CRP: 26 (11.66)  ESR: 43 (93)  VB.43/41; lactate: 1.0      Labs from prior hospitalization  Cox[place]ackie A: 1:800        Imaging:   CXR 2023  FINDINGS:  The heart is not enlarged.  There is no evidence of acute pulmonary infiltrate.  There is a small left pleural effusion.  No right effusion is present  IMPRESSION:  SMALL LEFT PLEURAL EFFUSION.      cMRI 2023  IMPRESSION:  1. Mildly dilated LV with normal systolic function (LVEF 55%).  2. Moderate-large pericardial effusion (max 2.1 cm posterolaterally).  3. Thickened pericardium with increased STIR signal and LGE  consistent with pericarditis. There is evidence of respirophasic  septal shift suggestive of constrictive physiology.  4. No evidence of myocardial inflammation/edema on T2 weighted  imaging.  5. No evidence of myocardial infarction or infiltration on LGE  imaging.  6. Mildly dilated RV with preserved systolic function.  7. Small bilateral pleural effusions.        REVIEW OF SYSTEMS:   ====================================  12 Point ROS otherwise negative    PHYSICAL EXAM:  ====================================      General: awake, alert, conversant, appears stated age  HENT: NCAT, PERRL, MMM  Eyes: no scleral icterus or conjunctival pallor  Skin: no suspect lesions or rashes noted on visible skin  Cardiac: RRR, normal S1, S2, no M/R/G  Pulm: Decreased breath sounds at L base  Abdomen: soft, ND, NT, no involuntary guarding or rebound tenderness  : no flank pain or indwelling urinary catheter  EXT: no peripheral edema, no asymmetry noted  MSK: no focal joint swelling noted, sensation and  strength intact 5/5 in all extremities b/l  Neuro: AOx3, able to follow commands, no gross FND  Psych: coherent thought process, appropriate mood and affect      ASSESSMENT & PLAN  ====================================  Mr. Jonah Britton is a 32 yo M with PMHx of pericarditis and pAF, who presents to  ED with recurrent fevers. Pt continuing to experience daily fevers with drenching night sweats. Still may be related to pericarditis, however will send for additional infectious and rheumatologic labs. Interestingly, pt does not acute worsening in past week after seeming to improve, so unclear what would have caused that change in trajectory. Pt also endorsing what sounds like orthopnea, with increased pillows at night, although no reported SOB, LE edema, or chest pain. Will order for repeat formal echo, although POCUS in ED reassuring. HDS stable at this time without evidence of tamponade. Will plan to consult Cardiology in AM for further evaluation and management. L pleural effusion seen on CXR, may be related to prior procedures. Will continuing monitoring respiratory status.     #Fevers  #Pericarditis  - Continue home colchicine, ibuprofen, and Tylenol  - Formal echo ordered  - Continue to monitor for signs of tamponade  - F/u CMV, HSV, EBV, HHV    #Hx of pAF  - Continue metoprolol succinate 50mg daily  - Not currently on AC  - Monitor on telemetry      F: bolus PRN  E: replete PRN  N: regular  A: PIV    DVT PPx: SCDs, low score  GI PPx: N/A    CODE STATUS: Full Code (confirmed on admission)  SURROGATE DECISION MAKER: Elvia rBitton (wife) [phone]; Dayna Britton (mom) [phone]

## 2023-11-17 NOTE — ED PROVIDER NOTES
HPI   Chief Complaint   Patient presents with    Fever     Recent hospital admission for pericarditis has been taking his meds at home as prescribed but states fever off and on        33-year-old male with past medical history significant for recent admission x2 for pericardial effusion of unclear etiology, presenting to the ED today with 3 days of worsening night sweats, nightly fever (higher overnight, highest last night at 101.8 Fahrenheit).  He states he has been taking metoprolol, colchicine, Tylenol since his discharge, but has been taking ibuprofen whenever his fever starts again.  He most recently saw his cardiologist 4 days ago, at which time he was cleared to begin activity as tolerated.    Otherwise he denies any abdominal pain, chest pain or, palpitations, shortness of breath.  He does report some nausea, says he has been able to eat and drink adequately.                                Lena Coma Scale Score: 15                  Patient History   History reviewed. No pertinent past medical history.  Past Surgical History:   Procedure Laterality Date    CARDIAC CATHETERIZATION N/A 2023    Procedure: Pericardiocentesis;  Surgeon: Jazmyn Rich MD;  Location: [place] [address] [facility]iac [facility];  Service: Cardiovascular;  Laterality: N/A;     No family history on file.  Social History     Tobacco Use    Smoking status: Former     Types: Cigarettes     Quit date: 10/1/2019     Years since quittin.1    Smokeless tobacco: Never   Substance Use Topics    Alcohol use: Not on file    Drug use: Not on file       Physical Exam   ED Triage Vitals [23 0926]   Temp Heart Rate Resp BP   36.8 °C (98.3 °F) 93 16 114/70      SpO2 Temp Source Heart Rate Source Patient Position   98 % Oral Monitor --      BP Location FiO2 (%)     Right arm --       Physical Exam  Vitals and nursing note reviewed.   Constitutional:       General: He is not in acute distress.     Appearance: Normal appearance. He is not  ill-appearing or toxic-appearing.   HENT:      Head: Normocephalic and atraumatic.      Mouth/Throat:      Mouth: Mucous membranes are moist.      Pharynx: Oropharynx is clear.   Eyes:      Extraocular Movements: Extraocular movements intact.      Conjunctiva/sclera: Conjunctivae normal.      Pupils: Pupils are equal, round, and reactive to light.   Cardiovascular:      Rate and Rhythm: Normal rate and regular rhythm.      Heart sounds: No murmur heard.     No friction rub. No gallop.   Pulmonary:      Effort: Pulmonary effort is normal. No respiratory distress.      Breath sounds: Normal breath sounds. No wheezing, rhonchi or rales.   Chest:      Chest wall: No tenderness.   Abdominal:      General: Abdomen is flat. Bowel sounds are normal. There is no distension.      Palpations: Abdomen is soft.      Tenderness: There is no abdominal tenderness. There is no right CVA tenderness, left CVA tenderness, guarding or rebound.   Musculoskeletal:         General: No swelling or deformity. Normal range of motion.      Cervical back: Normal range of motion and neck supple.      Right lower leg: No edema.      Left lower leg: No edema.   Skin:     General: Skin is warm and dry.      Capillary Refill: Capillary refill takes less than 2 seconds.   Neurological:      General: No focal deficit present.      Mental Status: He is alert and oriented to person, place, and time. Mental status is at baseline.      Sensory: No sensory deficit.      Motor: No weakness.      Coordination: Coordination normal.      Gait: Gait normal.   Psychiatric:         Mood and Affect: Mood normal.         Behavior: Behavior normal.         Thought Content: Thought content normal.         Judgment: Judgment normal.         ED Course & MDM   Diagnoses as of 11/17/23 1404   Fever, unspecified fever cause       Medical Decision Making  33-year-old male  who does present status post extensive work-up for pericardial effusion and pleural effusion who does  present with recurrent acute onset fevers x3 days.  Concern for recurrent infectious etiology versus autoimmune pathology.  Case was discussed with infectious disease physician as well as with his cardiologist Dany who are in agreement for readmission for infectious work-up.  Per discussion with ID Dr Khan viral titers were sent and blood cultures x2 were ordered.  Antibiotics were held per ID recs with plan to admit to medicine for further evaluation.  Case discussed with patient and family who are in agreement.      Bedside ultrasound demonstrates Trace pericardial effusion.    EKG interpreted by me shows normal sinus rhythm at a rate of 86 with no acute ischemic changes.  T wave inversions noted laterally similar to prior from November 14          Amount and/or Complexity of Data Reviewed  Labs: ordered. Decision-making details documented in ED Course.  Radiology: ordered. Decision-making details documented in ED Course.  ECG/medicine tests: ordered. Decision-making details documented in ED Course.      Procedure  Procedures     Deondre Cortés MD  11/17/23 1412       Benjamin Correa  11/17/23 [id number]

## 2023-11-17 NOTE — ED PROCEDURE NOTE
Procedure    Performed by: Jena Phillip MD  Authorized by: Deondre Cortés MD  Cardiac Indications: chest pain  Consitutional Indications: fever              Procedure: Cardiac Ultrasound    Findings:   Views: parasternal long, parasternal short and apical four  Effusion: The pericardial space was visualized and was positive for a PERICARDIAL EFFUSION. (trace pericardial effusion, similar in size when compared to 11/7/2023 TTE)  Activity: Ventricular contractions were visualized.  LV: LV systolic function was NORMAL.  RV: RV size was NORMAL.    Impression:                     Jena Phillip MD  Resident  11/17/23 [id number]

## 2023-11-17 NOTE — PROGRESS NOTES
Pharmacy Medication History Review    Jonah Britton is a 33 y.o. male admitted for No Principal Problem: There is no principal problem currently on the Problem List. Please update the Problem List and refresh.. Pharmacy reviewed the patient's [id number] medications and allergies for accuracy.    The list below reflects the updated PTA list. Comments regarding how patient may be taking medications differently can be found in the Admit Orders Activity  Prior to Admission Medications   Prescriptions Last Dose Informant Patient Reported? Taking?   colchicine 0.6 mg tablet 11/17/2023  No No   Sig: Take 1 tablet (0.6 mg) by mouth 2 times a day.   ibuprofen 600 mg tablet 11/17/2023  No No   Sig: Take 1 tablet (600 mg) by mouth every 12 hours.   metoprolol succinate XL (Toprol-XL) 50 mg 24 hr tablet 11/17/2023  No No   Sig: Take 1 tablet (50 mg) by mouth once daily. Do not crush or chew. Do not start before November 8, 2023.   pantoprazole (ProtoNix) 40 mg EC tablet 11/17/2023  No No   Sig: Take 1 tablet (40 mg) by mouth once daily in the morning. Take before meals. Do not crush, chew, or split. Do not start before November 8, 2023.      Facility-Administered Medications: None        The list below reflects the updated allergy list. Please review each documented allergy for additional clarification and justification.  Allergies  Reviewed by Adrian Miller RN on 11/17/2023   No Known Allergies         Patient accepts M2B at discharge. Pharmacy has been updated to [facility].    Sources used to complete the med history include out patient fill history, OARRS, and patient interview. Patient was a good historian, was able to confirm and update his med list. Patient mentioned that he has been told to take metoprolol tart 25 mg ( 2 tabs once daily) to finish the supply he already has, then he can start taking metoprolol succ 50 mg once daily after.  Advised the patient to only take the metoprolol succinate 50mg as  directed.      Below are additional concerns with the patient's PTA list.  ROXANNE Joyner, Al  Transitions of Care Pharmacist  [facility]  Please reach out via Secure Chat for questions, or if no response call [facility] or [facility]

## 2023-11-18 ENCOUNTER — APPOINTMENT (OUTPATIENT)
Dept: CARDIOLOGY | Facility: HOSPITAL | Age: 33
End: 2023-11-18
Payer: COMMERCIAL

## 2023-11-18 LAB
ALBUMIN SERPL BCP-MCNC: 4 G/DL (ref 3.4–5)
ANION GAP SERPL CALC-SCNC: 14 MMOL/L (ref 10–20)
ATRIAL RATE: 86 BPM
BASOPHILS # BLD AUTO: 0.05 X10*3/UL (ref 0–0.1)
BASOPHILS NFR BLD AUTO: 0.4 %
BUN SERPL-MCNC: 12 MG/DL (ref 6–23)
CALCIUM SERPL-MCNC: 9.6 MG/DL (ref 8.6–10.6)
CHLORIDE SERPL-SCNC: 98 MMOL/L (ref 98–107)
CO2 SERPL-SCNC: 29 MMOL/L (ref 21–32)
CREAT SERPL-MCNC: 0.8 MG/DL (ref 0.5–1.3)
EJECTION FRACTION APICAL 4 CHAMBER: 59.1
EJECTION FRACTION: 56
EOSINOPHIL # BLD AUTO: 0.18 X10*3/UL (ref 0–0.7)
EOSINOPHIL NFR BLD AUTO: 1.5 %
ERYTHROCYTE [DISTWIDTH] IN BLOOD BY AUTOMATED COUNT: 13.1 % (ref 11.5–14.5)
GFR SERPL CREATININE-BSD FRML MDRD: >90 ML/MIN/1.73M*2
GLUCOSE SERPL-MCNC: 95 MG/DL (ref 74–99)
HCT VFR BLD AUTO: 41.2 % (ref 41–52)
HGB BLD-MCNC: 13.4 G/DL (ref 13.5–17.5)
HSV1 DNA BLD QL NAA+PROBE: NOT DETECTED
HSV2 DNA BLD QL NAA+PROBE: NOT DETECTED
IMM GRANULOCYTES # BLD AUTO: 0.04 X10*3/UL (ref 0–0.7)
IMM GRANULOCYTES NFR BLD AUTO: 0.3 % (ref 0–0.9)
LEFT VENTRICLE INTERNAL DIMENSION DIASTOLE: 5.6 (ref 3.5–6)
LYMPHOCYTES # BLD AUTO: 1.59 X10*3/UL (ref 1.2–4.8)
LYMPHOCYTES NFR BLD AUTO: 13.4 %
MAGNESIUM SERPL-MCNC: 2.01 MG/DL (ref 1.6–2.4)
MCH RBC QN AUTO: 26.9 PG (ref 26–34)
MCHC RBC AUTO-ENTMCNC: 32.5 G/DL (ref 32–36)
MCV RBC AUTO: 83 FL (ref 80–100)
MONOCYTES # BLD AUTO: 0.88 X10*3/UL (ref 0.1–1)
MONOCYTES NFR BLD AUTO: 7.4 %
NEUTROPHILS # BLD AUTO: 9.11 X10*3/UL (ref 1.2–7.7)
NEUTROPHILS NFR BLD AUTO: 77 %
NRBC BLD-RTO: 0 /100 WBCS (ref 0–0)
P AXIS: 72 DEGREES
P OFFSET: 201 MS
P ONSET: 147 MS
PHOSPHATE SERPL-MCNC: 3.6 MG/DL (ref 2.5–4.9)
PLATELET # BLD AUTO: 302 X10*3/UL (ref 150–450)
POTASSIUM SERPL-SCNC: 3.9 MMOL/L (ref 3.5–5.3)
PR INTERVAL: 146 MS
Q ONSET: 220 MS
QRS COUNT: 14 BEATS
QRS DURATION: 82 MS
QT INTERVAL: 322 MS
QTC CALCULATION(BAZETT): 385 MS
QTC FREDERICIA: 363 MS
R AXIS: 59 DEGREES
RBC # BLD AUTO: 4.98 X10*6/UL (ref 4.5–5.9)
RIGHT VENTRICLE FREE WALL PEAK S': 14
SODIUM SERPL-SCNC: 137 MMOL/L (ref 136–145)
T AXIS: -33 DEGREES
T OFFSET: 381 MS
TRICUSPID ANNULAR PLANE SYSTOLIC EXCURSION: 2.6
VENTRICULAR RATE: 86 BPM
WBC # BLD AUTO: 11.9 X10*3/UL (ref 4.4–11.3)

## 2023-11-18 PROCEDURE — 1200000002 HC GENERAL ROOM WITH TELEMETRY DAILY

## 2023-11-18 PROCEDURE — 99232 SBSQ HOSP IP/OBS MODERATE 35: CPT

## 2023-11-18 PROCEDURE — 2500000001 HC RX 250 WO HCPCS SELF ADMINISTERED DRUGS (ALT 637 FOR MEDICARE OP)

## 2023-11-18 PROCEDURE — 2500000001 HC RX 250 WO HCPCS SELF ADMINISTERED DRUGS (ALT 637 FOR MEDICARE OP): Performed by: STUDENT IN AN ORGANIZED HEALTH CARE EDUCATION/TRAINING PROGRAM

## 2023-11-18 PROCEDURE — 93321 DOPPLER ECHO F-UP/LMTD STD: CPT

## 2023-11-18 PROCEDURE — 2500000004 HC RX 250 GENERAL PHARMACY W/ HCPCS (ALT 636 FOR OP/ED)

## 2023-11-18 PROCEDURE — 80069 RENAL FUNCTION PANEL: CPT

## 2023-11-18 PROCEDURE — 83735 ASSAY OF MAGNESIUM: CPT

## 2023-11-18 PROCEDURE — 93308 TTE F-UP OR LMTD: CPT | Performed by: STUDENT IN AN ORGANIZED HEALTH CARE EDUCATION/TRAINING PROGRAM

## 2023-11-18 PROCEDURE — 36415 COLL VENOUS BLD VENIPUNCTURE: CPT

## 2023-11-18 PROCEDURE — 93325 DOPPLER ECHO COLOR FLOW MAPG: CPT

## 2023-11-18 PROCEDURE — 93321 DOPPLER ECHO F-UP/LMTD STD: CPT | Performed by: STUDENT IN AN ORGANIZED HEALTH CARE EDUCATION/TRAINING PROGRAM

## 2023-11-18 PROCEDURE — 85025 COMPLETE CBC W/AUTO DIFF WBC: CPT

## 2023-11-18 PROCEDURE — 93325 DOPPLER ECHO COLOR FLOW MAPG: CPT | Performed by: STUDENT IN AN ORGANIZED HEALTH CARE EDUCATION/TRAINING PROGRAM

## 2023-11-18 PROCEDURE — 99223 1ST HOSP IP/OBS HIGH 75: CPT | Performed by: STUDENT IN AN ORGANIZED HEALTH CARE EDUCATION/TRAINING PROGRAM

## 2023-11-18 RX ORDER — ACETAMINOPHEN 325 MG/1
975 TABLET ORAL EVERY 6 HOURS PRN
Status: DISCONTINUED | OUTPATIENT
Start: 2023-11-18 | End: 2023-11-21 | Stop reason: HOSPADM

## 2023-11-18 RX ADMIN — ACETAMINOPHEN 650 MG: 325 TABLET ORAL at 05:33

## 2023-11-18 RX ADMIN — IBUPROFEN 600 MG: 600 TABLET ORAL at 15:14

## 2023-11-18 RX ADMIN — COLCHICINE 0.6 MG: 0.6 TABLET ORAL at 22:31

## 2023-11-18 RX ADMIN — METOPROLOL SUCCINATE 50 MG: 50 TABLET, EXTENDED RELEASE ORAL at 08:54

## 2023-11-18 RX ADMIN — IBUPROFEN 600 MG: 600 TABLET ORAL at 04:57

## 2023-11-18 RX ADMIN — ACETAMINOPHEN 975 MG: 325 TABLET ORAL at 23:47

## 2023-11-18 RX ADMIN — Medication 3 MG: at 22:31

## 2023-11-18 RX ADMIN — PANTOPRAZOLE SODIUM 40 MG: 40 TABLET, DELAYED RELEASE ORAL at 06:51

## 2023-11-18 RX ADMIN — COLCHICINE 0.6 MG: 0.6 TABLET ORAL at 08:54

## 2023-11-18 SDOH — SOCIAL STABILITY: SOCIAL INSECURITY: ARE YOU OR HAVE YOU BEEN THREATENED OR ABUSED PHYSICALLY, EMOTIONALLY, OR SEXUALLY BY ANYONE?: NO

## 2023-11-18 SDOH — SOCIAL STABILITY: SOCIAL INSECURITY: ARE THERE ANY APPARENT SIGNS OF INJURIES/BEHAVIORS THAT COULD BE RELATED TO ABUSE/NEGLECT?: NO

## 2023-11-18 SDOH — SOCIAL STABILITY: SOCIAL INSECURITY: DO YOU FEEL UNSAFE GOING BACK TO THE PLACE WHERE YOU ARE LIVING?: NO

## 2023-11-18 SDOH — SOCIAL STABILITY: SOCIAL INSECURITY: DO YOU FEEL ANYONE HAS EXPLOITED OR TAKEN ADVANTAGE OF YOU FINANCIALLY OR OF YOUR PERSONAL PROPERTY?: NO

## 2023-11-18 SDOH — SOCIAL STABILITY: SOCIAL INSECURITY: HAS ANYONE EVER THREATENED TO HURT YOUR FAMILY OR YOUR PETS?: NO

## 2023-11-18 SDOH — SOCIAL STABILITY: SOCIAL INSECURITY: HAVE YOU HAD THOUGHTS OF HARMING ANYONE ELSE?: NO

## 2023-11-18 SDOH — SOCIAL STABILITY: SOCIAL INSECURITY: ABUSE: ADULT

## 2023-11-18 SDOH — SOCIAL STABILITY: SOCIAL INSECURITY: WERE YOU ABLE TO COMPLETE ALL THE BEHAVIORAL HEALTH SCREENINGS?: YES

## 2023-11-18 SDOH — SOCIAL STABILITY: SOCIAL INSECURITY: DOES ANYONE TRY TO KEEP YOU FROM HAVING/CONTACTING OTHER FRIENDS OR DOING THINGS OUTSIDE YOUR HOME?: NO

## 2023-11-18 ASSESSMENT — ACTIVITIES OF DAILY LIVING (ADL)
JUDGMENT_ADEQUATE_SAFELY_COMPLETE_DAILY_ACTIVITIES: YES
HEARING - RIGHT EAR: FUNCTIONAL
ADEQUATE_TO_COMPLETE_ADL: YES
PATIENT'S MEMORY ADEQUATE TO SAFELY COMPLETE DAILY ACTIVITIES?: YES
HEARING - LEFT EAR: FUNCTIONAL
DRESSING YOURSELF: INDEPENDENT
TOILETING: INDEPENDENT
LACK_OF_TRANSPORTATION: NO
FEEDING YOURSELF: INDEPENDENT
WALKS IN HOME: INDEPENDENT
BATHING: INDEPENDENT
GROOMING: INDEPENDENT

## 2023-11-18 ASSESSMENT — COGNITIVE AND FUNCTIONAL STATUS - GENERAL
MOBILITY SCORE: 24
MOBILITY SCORE: 24
DAILY ACTIVITIY SCORE: 24
PATIENT BASELINE BEDBOUND: NO
DAILY ACTIVITIY SCORE: 24

## 2023-11-18 ASSESSMENT — PAIN SCALES - GENERAL
PAINLEVEL_OUTOF10: 0 - NO PAIN
PAINLEVEL_OUTOF10: 1

## 2023-11-18 ASSESSMENT — LIFESTYLE VARIABLES
HOW OFTEN DO YOU HAVE 6 OR MORE DRINKS ON ONE OCCASION: NEVER
SKIP TO QUESTIONS 9-10: 1
AUDIT-C TOTAL SCORE: 1
HOW OFTEN DO YOU HAVE A DRINK CONTAINING ALCOHOL: MONTHLY OR LESS
AUDIT-C TOTAL SCORE: 1
HOW MANY STANDARD DRINKS CONTAINING ALCOHOL DO YOU HAVE ON A TYPICAL DAY: 1 OR 2
PRESCIPTION_ABUSE_PAST_12_MONTHS: NO
SUBSTANCE_ABUSE_PAST_12_MONTHS: NO

## 2023-11-18 ASSESSMENT — PATIENT HEALTH QUESTIONNAIRE - PHQ9
2. FEELING DOWN, DEPRESSED OR HOPELESS: NOT AT ALL
1. LITTLE INTEREST OR PLEASURE IN DOING THINGS: NOT AT ALL
SUM OF ALL RESPONSES TO PHQ9 QUESTIONS 1 & 2: 0

## 2023-11-18 ASSESSMENT — PAIN - FUNCTIONAL ASSESSMENT
PAIN_FUNCTIONAL_ASSESSMENT: 0-10

## 2023-11-18 NOTE — CARE PLAN
Problem: Pain  Goal: My pain/discomfort is manageable  Outcome: Progressing     Problem: Safety  Goal: Patient will be injury free during hospitalization  Outcome: Progressing  Goal: I will remain free of falls  Outcome: Progressing     Problem: Daily Care  Goal: Daily care needs are met  Outcome: Progressing     Problem: Psychosocial Needs  Goal: Demonstrates ability to cope with hospitalization/illness  Outcome: Progressing  Goal: Collaborate with me, my family, and caregiver to identify my specific goals  Outcome: Progressing     Problem: Discharge Barriers  Goal: My discharge needs are met  Outcome: Progressing

## 2023-11-18 NOTE — CONSULTS
Cardiology Consult Note    Reason For Consult  Pericardia effusion    History Of Present Illness  Jonah Britton is a 33 y.o. male who PMH of acute pericarditis with pericardial effusion s/p  pericardiocentesis and pAfib who presented with recurrent fevers and admitted for fever of unknown origin.    Cardiology consulted for persistent pericardial effusion.    Of note, patient  was recently admitted in Oct and Nov, found to have pericarditis with pericardial effusion in the setting of viral syndrome with no evidence of cardiac tamponade.  Was also noted to be in atrial fibrillation with RVR, spontaneously converted to NSR.  Underwent multiple work-up including inflammatory markers, TB, hepatitis panel, viral panel, autoimmune work-up with no significant abnormalities only positive for coxsackie virus.  CT PE obtained was negative for PE.  He underwent pericardiocentesis on  with drainage of 350 cc of fluid with drain in place for a few days.  Cytology negative for malignancy.  Cardiac MRI from  showed normal systolic function with EF 55%, moderate pericardial effusion, evidence of pericarditis and findings suggestive of constrictive physiology.  He was discharged home on colchicine 0.6 mg twice daily and ibuprofen 600 mg twice daily.    Patient now returning due to persistent fevers for the past several days up to 102F associated with night sweats and some recent weight loss.  He denies any chest pain, shortness of breath, palpitations, orthopnea, PND, presyncope or syncope.    Past Medical History  He has no past medical history on file.    Surgical History  He has a past surgical history that includes Cardiac catheterization (N/A, 2023).     Social History  Social History     Tobacco Use    Smoking status: Former     Types: Cigarettes     Quit date: 10/1/2019     Years since quittin.1    Smokeless tobacco: Never  "      Family History  No family history on file.     Allergies  Patient has no known allergies.    Medications       Current Facility-Administered Medications:     colchicine tablet 0.6 mg, 0.6 mg, oral, BID, Farideh Whitehead MD, 0.6 mg at 11/18/23 0854    ibuprofen tablet 600 mg, 600 mg, oral, q12h, Farideh Whitehead MD, 600 mg at 11/18/23 1514    melatonin tablet 3 mg, 3 mg, oral, Nightly PRN, Farideh Whitehead MD, 3 mg at 11/17/23 2033    metoprolol succinate XL (Toprol-XL) 24 hr tablet 50 mg, 50 mg, oral, Daily, Farideh Whitehead MD, 50 mg at 11/18/23 0854    pantoprazole (ProtoNix) EC tablet 40 mg, 40 mg, oral, Daily before breakfast, Farideh Whitehead MD, 40 mg at 11/18/23 0651    perflutren lipid microspheres (Definity) injection 0.5-10 mL of dilution, 0.5-10 mL of dilution, intravenous, Once in imaging, Farideh Whitehead MD    sodium chloride 0.9% flush 3 mL, 3 mL, intravenous, PRN, Farideh Whitehead MD       Review of Systems  A 12 point review of system was performed and negative except as reported in the HPI     Physical Exam:  General: Not in acute distress  HEENT: Atraumatic/Normocephalic. PEERL. EOM intact.   Neck: Supple. No JVD  Lungs: Clear to auscultation bilaterally. No wheezes, rales or rhonchi  Heart: Regular rate and rhythm with no murmurs, rubs or gallops  Extremities: No pitting edema    Last Recorded Vitals  Blood pressure 137/81, pulse 103, temperature 36.2 °C (97.2 °F), resp. rate 16, height 1.956 m (6' 5\"), weight 102 kg (225 lb), SpO2 96 %.    Lab Review  Results for orders placed or performed during the hospital encounter of 11/17/23 (from the past 24 hour(s))   CBC and Auto Differential   Result Value Ref Range    WBC 11.9 (H) 4.4 - 11.3 x10*3/uL    nRBC 0.0 0.0 - 0.0 /100 WBCs    RBC 4.98 4.50 - 5.90 x10*6/uL    Hemoglobin 13.4 (L) 13.5 - 17.5 g/dL    Hematocrit 41.2 41.0 - 52.0 %    MCV 83 80 - 100 fL    MCH 26.9 26.0 - 34.0 pg    MCHC 32.5 32.0 - 36.0 g/dL    RDW 13.1 11.5 - 14.5 %    " Platelets 302 150 - 450 x10*3/uL    Neutrophils % 77.0 40.0 - 80.0 %    Immature Granulocytes %, Automated 0.3 0.0 - 0.9 %    Lymphocytes % 13.4 13.0 - 44.0 %    Monocytes % 7.4 2.0 - 10.0 %    Eosinophils % 1.5 0.0 - 6.0 %    Basophils % 0.4 0.0 - 2.0 %    Neutrophils Absolute 9.11 (H) 1.20 - 7.70 x10*3/uL    Immature Granulocytes Absolute, Automated 0.04 0.00 - 0.70 x10*3/uL    Lymphocytes Absolute 1.59 1.20 - 4.80 x10*3/uL    Monocytes Absolute 0.88 0.10 - 1.00 x10*3/uL    Eosinophils Absolute 0.18 0.00 - 0.70 x10*3/uL    Basophils Absolute 0.05 0.00 - 0.10 x10*3/uL   Renal Function Panel   Result Value Ref Range    Glucose 95 74 - 99 mg/dL    Sodium 137 136 - 145 mmol/L    Potassium 3.9 3.5 - 5.3 mmol/L    Chloride 98 98 - 107 mmol/L    Bicarbonate 29 21 - 32 mmol/L    Anion Gap 14 10 - 20 mmol/L    Urea Nitrogen 12 6 - 23 mg/dL    Creatinine 0.80 0.50 - 1.30 mg/dL    eGFR >90 >60 mL/min/1.73m*2    Calcium 9.6 8.6 - 10.6 mg/dL    Phosphorus 3.6 2.5 - 4.9 mg/dL    Albumin 4.0 3.4 - 5.0 g/dL   Magnesium   Result Value Ref Range    Magnesium 2.01 1.60 - 2.40 mg/dL   Transthoracic Echo (TTE) Limited   Result Value Ref Range    BSA 2.35 m2        Cardiographics  ECG: Sinus tachycardia    Echocardiogram: 11/7/2023  CONCLUSIONS:   1. Left ventricular systolic function is normal with a 55% estimated ejection fraction.   2. There appears to be a septal bounce.   3. There is a moderate pericardial effusion.   4. There is a moderate complex, organized appearing residual pericardial effusion which is largest posterior to and lateral to the LV. There is no significant respiratory variation of the MV inflows ( 11.6%) or TV inflows ( 35%). There is no RA or RV collapse. The IVC is dilated ( 2.4cm) but collapses normally with inspiration ( > 50%). No tissue Doppler was done to assess for anulus reversus. Overall there are no echocardiographic features suggestive of tamponade. There is also no significant hepatic vein flow  diastolic reversal on expiration. Pt is s/p pericardiocentesis on 11/2/2023. Not a complete assessment for constriction given lack of tissue Doppler, however other than a possible septal bounce, no echocardiographic data to suggest constriction.   5. Compared with the prior exam from 10/13/2023 there are no significant changes. The organized pericardial effusion appears similar in size and extent of organization.    Cardiac MRI 11/5/2023  IMPRESSION:  1. Mildly dilated LV with normal systolic function (LVEF 55%).  2. Moderate-large pericardial effusion (max 2.1 cm posterolaterally).  3. Thickened pericardium with increased STIR signal and LGE  consistent with pericarditis. There is evidence of respirophasic  septal shift suggestive of constrictive physiology.  4. No evidence of myocardial inflammation/edema on T2 weighted  imaging.  5. No evidence of myocardial infarction or infiltration on LGE  imaging.  6. Mildly dilated RV with preserved systolic function.  7. Small bilateral pleural effusions.      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        Imaging  XR chest 1 view    Result Date: 11/17/2023  STUDY: Chest Radiograph;  11/17/23 at 10:55 AM INDICATION: Chest pain. COMPARISON: Chest XR and CTA chest 10/31/23. ACCESSION NUMBER(S): [id number] ORDERING CLINICIAN: AUSTIN CORTÉS TECHNIQUE:  Frontal chest was obtained at 1051 hours. FINDINGS: The heart is not enlarged. There is no evidence of acute pulmonary infiltrate. There is a small left pleural effusion. No right effusion is present    SMALL LEFT PLEURAL EFFUSION. ___ Signed by Alexi Jarrett MD    Point of Care Ultrasound    Result Date: 11/17/2023  Jena Phillip MD     11/17/2023 12:07 PM Performed by: Jena Phillip MD Authorized by: Austin Cortés MD  Cardiac Indications: chest pain Consitutional Indications: fever Procedure: Cardiac Ultrasound Findings:  Views: parasternal long, parasternal short and  apical four Effusion: The pericardial space was visualized and was positive for a PERICARDIAL EFFUSION. (trace pericardial effusion, similar in size when compared to 11/7/2023 TTE) Activity: Ventricular contractions were visualized. LV: LV systolic function was NORMAL. RV: RV size was NORMAL. Impression:      Assessment and Plan  #Fever of unknown origin  #Recent acute pericarditis with pericardial effusion requiring drainage  Currently no clinical signs of pericarditis at this time (no chest pain typical for pericarditis, no friction rub upon auscultation). EKG showed some ST segment depression in V4 and V5 with diffuse T wave inversion which are non specific. There are no ST elevations or NJ segment depressions seen. Trop negative. Although patient has persistent pericardial effusion, it is decreased from prior imaging.   -No indication for drainage at this time  -Cont colchicine and ibuprofen  -Continue antipyretics for fevers per primary team  -Consider obtaining whole body PET to further evaluate as patient has B symptoms  -Appreciate ID recommendation    #pAF  CHASVASC 0. No indication for AC.   Currently sinus tachy in the setting of fever.  -Cont home metop    Colette Roach MD  Cardiology, PGY4

## 2023-11-18 NOTE — DISCHARGE INSTRUCTIONS
Dear Mr. Britton,  You presented to the hospital due to continued fevers and night sweats in the setting of recent diagnosis of pericarditis and atrial fibrillation. You were also found to have a left sided pleural effusion on admission chest X-ray.  While admitted at [facility], transthoracic echocardiogram was ordered to further evaluate pericarditis and showed normal heart squeezing function, and mild to moderate pericardial effusion improved from your last echocardiogram. We also ordered lab tests to assess for viruses known to cause pericarditis but those lab tests returned negative. A PET scan was performed to evaluate for any malignancies or syndromes that may be consistent with your constellation of symptoms (night sweats and fevers). Those results did not show a concern for malignancy, but re-demonstrated the pericardial effusion. While inpatient, we also consulted cardiology, and they suggested we test you for a gene called Familial Mediterranean Fever which may be a potential cause of your symptoms. As such we consulted the genetics team, however you declined genetic evaluation at this time. A thoracentesis was performed to further evaluate the left sided pleural effusion, and those lab studies were most consistent with an effusion in the setting of infection- most likely from known Coxsackie virus infection.   On discharge it is important to follow up with your PCP and cardiology for continued care.  It was a pleasure caring for you.  Sincerely,  Your  Care Team

## 2023-11-18 NOTE — PROGRESS NOTES
Jonah Britton is a 33 y.o. male on day 0 of admission presenting with Fever, unspecified fever cause.    Subjective   Interval History: Patient seen at bedside this morning. Reports fever overnight to 102. Says it resolved with Tylenol, ibuprofen. Otherwise feeling okay this morning. Denies chest pain, SOB.         Review of Systems    Objective   Range of Vitals (last 24 hours)  Heart Rate:  []   Temp:  [35.9 °C (96.6 °F)-38.9 °C (102 °F)]   Resp:  [12-33]   BP: (116-151)/(69-88)   SpO2:  [95 %-100 %]   Daily Weight  11/17/23 : 102 kg (225 lb)    Body mass index is 26.68 kg/m².    Physical Exam  General: pleasant, in NAD  HEENT: normocephalic, atraumatic  CV: heart regular rate and rhythm w/o murmurs  Resp: lungs clear to auscultation bilaterally  Abd: soft, nondistended, nontender  Ext: no peripheral edema  Neuro: grossly nonfocal, speech clear  Psych: appropriate affect    Antibiotics  sodium chloride 0.9% flush 3 mL  colchicine tablet 0.6 mg  ibuprofen tablet 600 mg  metoprolol succinate XL (Toprol-XL) 24 hr tablet 50 mg  pantoprazole (ProtoNix) EC tablet 40 mg  perflutren lipid microspheres (Definity) injection 0.5-10 mL of dilution  sulfur hexafluoride microsphr (Lumason) injection 24.28 mg  perflutren protein A microsphere (Optison) injection 0.5 mL  acetaminophen (Tylenol) tablet 650 mg  melatonin tablet 3 mg  perflutren lipid microspheres (Definity) injection 0.5-10 mL of dilution      Relevant Results  Labs  Results from last 72 hours   Lab Units 11/18/23  0505 11/17/23  0949   WBC AUTO x10*3/uL 11.9* 17.4*   HEMOGLOBIN g/dL 13.4* 13.0*   HEMATOCRIT % 41.2 41.0   PLATELETS AUTO x10*3/uL 302 581*   NEUTROS PCT AUTO % 77.0 81.2   LYMPHS PCT AUTO % 13.4 9.9   MONOS PCT AUTO % 7.4 7.5   EOS PCT AUTO % 1.5 0.5     Results from last 72 hours   Lab Units 11/18/23  0755 11/17/23  0949   SODIUM mmol/L 137 134*   POTASSIUM mmol/L 3.9 4.1   CHLORIDE mmol/L 98 95*   CO2 mmol/L 29 28   BUN mg/dL 12 13    CREATININE mg/dL 0.80 0.90   GLUCOSE mg/dL 95 103*   CALCIUM mg/dL 9.6 9.7   ANION GAP mmol/L 14 15   EGFR mL/min/1.73m*2 >90 >90   PHOSPHORUS mg/dL 3.6  --      Results from last 72 hours   Lab Units 11/18/23  0755 11/17/23  0949   ALK PHOS U/L  --  91   BILIRUBIN TOTAL mg/dL  --  0.7   PROTEIN TOTAL g/dL  --  7.9   ALT U/L  --  29   AST U/L  --  13   ALBUMIN g/dL 4.0 4.6     Estimated Creatinine Clearance: 125 mL/min (by C-G formula based on SCr of 0.8 mg/dL).  C-Reactive Protein   Date Value Ref Range Status   11/17/2023 26.26 (H) <1.00 mg/dL Final   11/07/2023 11.66 (H) <1.00 mg/dL Final   11/04/2023 24.11 (H) <1.00 mg/dL Final     XR chest 1 view    Result Date: 11/17/2023  STUDY: Chest Radiograph;  11/17/23 at 10:55 AM INDICATION: Chest pain. COMPARISON: Chest XR and CTA chest 10/31/23. ACCESSION NUMBER(S): [id number] ORDERING CLINICIAN: AUSTIN CORTÉS TECHNIQUE:  Frontal chest was obtained at 1051 hours. FINDINGS: The heart is not enlarged. There is no evidence of acute pulmonary infiltrate. There is a small left pleural effusion. No right effusion is present    SMALL LEFT PLEURAL EFFUSION. ___ Signed by Alexi Jarrett MD    Point of Care Ultrasound    Result Date: 11/17/2023  Jena Phillip MD     11/17/2023 12:07 PM Performed by: Jena Phillip MD Authorized by: Austin Cortés MD  Cardiac Indications: chest pain Consitutional Indications: fever Procedure: Cardiac Ultrasound Findings:  Views: parasternal long, parasternal short and apical four Effusion: The pericardial space was visualized and was positive for a PERICARDIAL EFFUSION. (trace pericardial effusion, similar in size when compared to 11/7/2023 TTE) Activity: Ventricular contractions were visualized. LV: LV systolic function was NORMAL. RV: RV size was NORMAL. Impression:     Transthoracic Echo (TTE) Limited    Result Date: 11/7/2023   [facility], [address] [place] [id number]                Tel  [phone] and Fax [phone] TRANSTHORACIC ECHOCARDIOGRAM REPORT  Patient Name:      SHELBY AGUILAR       Reading Physician:    [id number] Noa Ellis MD Study Date:        11/7/2023            Ordering Provider:    [id number] ALVARO ARIZA MRN/PID:           [id number]             Fellow: Accession#:        [id number]         Nurse: Date of Birth/Age: 1990 / 33      Sonographer:                    years Gender:            M                    Additional Staff: Height:            195.58 cm            Admit Date: Weight:            106.14 kg            Admission Status:     Inpatient -                                                               Priority discharge BSA:               2.39 m2              Encounter#:           [id number]                                         Department Location:  [place] [address] Blood Pressure: 145 /81 mmHg Study Type:    TRANSTHORACIC ECHO (TTE) LIMITED Diagnosis/ICD: Other pericardial effusion (noninflammatory)-I31.39 Indication:    evaluate pericardial effusion post drain removal with evaluation                for constriction Patient History: Pertinent History: Pericarditis. Study Detail: The following Echo studies were performed: 2D, Doppler, M-Mode and               color flow.  PHYSICIAN INTERPRETATION: Left Ventricle: The left ventricular systolic function is normal, with an estimated ejection fraction of 55%. There are no regional wall motion abnormalities. The left ventricular cavity size is normal. There appears to be a septal bounce. Left ventricular diastolic filling was not assessed. Left Atrium: The left atrium is normal in size. Right Ventricle: The right ventricle is normal in size. There is normal right ventricular global systolic function. Right Atrium: The right atrium is  mildly dilated. Aortic Valve: The aortic valve is trileaflet. Aortic valve regurgitation was not assessed. Mitral Valve: The mitral valve is normal in structure. There is trace to mild mitral valve regurgitation. Tricuspid Valve: The tricuspid valve is structurally normal. There is trace tricuspid regurgitation. Pulmonic Valve: The pulmonic valve was not assessed. Pulmonic valve regurgitation was not assessed. Pericardium: There is a moderately sized pericardial effusion. There is a moderate complex, organized appearing residual pericardial effusion which is largest posterior to and lateral to the LV. There is no significant respiratory variation of the MV inflows ( 11.6%) or TV inflows ( 35%). There is no RA or RV collapse. The IVC is dilated ( 2.4cm) but collapses normally with inspiration ( > 50%). No tissue Doppler was done to assess for anulus reversus. Overall there are no echocardiographic features suggestive of tamponade. There is also no significant hepatic vein flow diastolic reversal on expiration. Pt is s/p pericardiocentesis on 11/2/2023. Not a complete assessment for constriction given lack of tissue Doppler, however other than a possible septal bounce, no echocardiographic data to suggest constriction. Aorta: The aortic root is normal. Systemic Veins: The inferior vena cava appears dilated. There is IVC inspiratory collapse greater than 50%. In comparison to the previous echocardiogram(s): Compared with the prior exam from 10/13/2023 there are no significant changes. The organized pericardial effusion appears similar in size and extent of organization.  CONCLUSIONS:  1. Left ventricular systolic function is normal with a 55% estimated ejection fraction.  2. There appears to be a septal bounce.  3. There is a moderate pericardial effusion.  4. There is a moderate complex, organized appearing residual pericardial effusion which is largest posterior to and lateral to the LV. There is no significant  respiratory variation of the MV inflows ( 11.6%) or TV inflows ( 35%). There is no RA or RV collapse. The IVC is dilated ( 2.4cm) but collapses normally with inspiration ( > 50%). No tissue Doppler was done to assess for anulus reversus. Overall there are no echocardiographic features suggestive of tamponade. There is also no significant hepatic vein flow diastolic reversal on expiration. Pt is s/p pericardiocentesis on 11/2/2023. Not a complete assessment for constriction given lack of tissue Doppler, however other than a possible septal bounce, no echocardiographic data to suggest constriction.  5. Compared with the prior exam from 10/13/2023 there are no significant changes. The organized pericardial effusion appears similar in size and extent of organization. QUANTITATIVE DATA SUMMARY: 2D MEASUREMENTS:                           Normal Ranges: Ao Root d:     3.30 cm    (2.0-3.7cm) LAs:           3.90 cm    (2.7-4.0cm) IVSd:          1.18 cm    (0.6-1.1cm) LVPWd:         1.12 cm    (0.6-1.1cm) LVIDd:         5.40 cm    (3.9-5.9cm) LVIDs:         4.05 cm LV Mass Index: 104.3 g/m2 LV % FS        25.0 % LA VOLUME:                              Normal Ranges: LA Vol A4C:        77.5 ml   (22+/-6mL/m2) LA Vol Index A4C:  32.4ml/m2 LA Area A4C:       24.2 cm2 LA Major Axis A4C: 6.4 cm RA VOLUME BY A/L METHOD:                       Normal Ranges: RA Area A4C: 22.0 cm2 AORTA MEASUREMENTS:                      Normal Ranges: Ao Sinus, d: 3.30 cm (2.1-3.5cm) LV SYSTOLIC FUNCTION BY 2D PLANIMETRY (MOD):                     Normal Ranges: EF-A4C View: 60.5 % (>=55%) EF-A2C View: 53.4 % EF-Biplane:  55.1 % LV DIASTOLIC FUNCTION:                     Normal Ranges: MV Peak E: 0.94 m/s (0.7-1.2 m/s) MV Peak A: 0.47 m/s (0.42-0.7 m/s) E/A Ratio: 2.00     (1.0-2.2) MITRAL VALVE:                 Normal Ranges: MV DT: 169 msec (150-240msec)  RIGHT VENTRICLE: RV Basal 4.05 cm TRICUSPID VALVE/RVSP:                   Normal Ranges: IVC  Diam: 2.40 cm  [id number] Noa Ellis MD Electronically signed on 11/7/2023 at 10:56:48 AM  ** Final **     MR cardiac morphology and function w and wo IV contrast    Result Date: 11/7/2023  Interpreted By:  Israel Saenz,  and Batsheva Vanegas STUDY: MR CARDIAC MORPHOLOGY AND FUNCTION W AND WO IV CONTRAST;  11/6/2023 1:42 pm   INDICATION: Signs/Symptoms:reassess pericardial effusion and pleural effusion. r/o effusive pericarditis.   This study is performed to assess myocardial viability and damage, and to quantitate left ventricular and valvular function.   COMPARISON: None.   ACCESSION NUMBER(S): [id number]   ORDERING CLINICIAN: NAOMI MARIA   TECHNIQUE: Siemens1.5  Lachelle MRI scanner. Turbo spin echo and balanced steady state free precession (bSSFP) imaging for anatomic definition. Dynamic cine bSSFP for cardiac chamber and wall-motion analysis, and valvular analysis. Flow quantification sequences for hemodynamics. Delayed gadolinium enhancement analysis after injection of gadolinium-chelate (mL of Dotarem, 0.2 mmol/kg).   HT- 195 cm; WT-106.0 kg; BSA- 2.38 m2   FINDINGS: CARDIAC CHAMBERS Normal atrioventricular and ventriculoarterial concordance   LEFT ATRIUM Dilated (PEDRO-62.6 ml/m2).   RIGHT ATRIUM Mildly enlarged (RA max Vol indexed-24.82 ml/m2)   INTERATRIAL SEPTUM Lipomatous hypertrophy   LEFT VENTRICLE: 1. Mildly dilated LV with normal systolic function (LVEF 55%). 2. No regional wall motion abnormalities. 3. Normal LV wall thickness and increased LV indexed mass. 4. Normal native T2 values (<55ms)/ and normal T2 STIR imaging. 5. Mildly elevated ECV 32% 6. No evidence of LV thrombus. 7. Following administration of gadolinium, in the late phase, there is no significant myocardial enhancement.   Quantitative left ventricular functional values are as follows: EDV = 270 cc; EDVi = 113 cc/m2 ESV = 121 cc; ESVi = 51 cc/m2 Stroke volume = 149 cc; SVi = 63 cc/m2 LVEF = 55 % Absolute Cardiac Output = 9.99 l/min.; COi = 4.19  l/min/m2 LV mass = 223 gm; LVMi = 93 gm/m2   LVEDD: 5.6 cm LVESD: 3.7 cm LV septal wall thicknes(anterobasal): 1.2 cm LV postero-inferior wall thickness: 1.2 Cm   RIGHT VENTRICLE Mildly dilated RV (EDVi 106 mL/m2), with preserved systolic function (RVEF 52%). No segmental wall motion abnormalities.   INTERVENTRICULAR SEPTUM Intact.   AORTIC VALVE The aortic valve is trileaflet with normal excursion. There is  no aortic regurgitation. Flow quantification through the ascending aorta: Forward volume =143 cc/beat Reverse volume = 0 cc/beat Net forward volume = 143 cc/beat Aortic regurgitant fraction = 0 % Peak aortic velocity: 165 cm/sec   MITRAL VALVE There is  trivial mitral regurgitation. Integrating LV volumetric and aortic flow quantification data reveals: Quantitative mitral regurgitant volume = 6 cc/beat Quantitative mitral regurgitant fraction = 4 %   TRICUSPID VALVE There is qualitative trivial tricuspid regurgitation.   PULMONARY VALVE: Not assessed.   PERICARDIUM: The pericardium is thickened. There is increased pericardial signal on STIR imaging in addition to LGE consistent with pericardial inflammation/pericarditis. There is moderate-to-large circumferential pericardial effusion. Maximal diameter of 2.1 cm posterolaterally. There is fibrinous material within the pericardial fluid. There is respirophasic septal flattening suggestive of constrictive physiology. There is no evidence of RV/RA collapse. The IVC appears dilated (2.4 cm).   THORACIC AORTA The visualized thoracic aorta appears normal in course, caliber, and contour.  There is no evidence for acute aortic pathology. .   AORTIC ROOT DIMENSIONS: Aortic root(sinus of valsalva): 3.5 cm Annulus: 2.9 cm Sinotubular junction: 2.7 cm   PULMONARY ARTERIES The central pulmonary arteries appear  normal (MPA-2.9 cm, RPA-1.8 cm, LPA-1.9  cm).   SYSTEMIC AND PULMONARY VEINS Normal systemic venous and pulmonary venous return. The SVC is of normal caliber and IVC  appears dilated measuring 3.7 cm. Normal pulmonary venous anatomy.   CHEST The chest wall is normal. Limited imaging through the lungs reveals small bilateral pleural effusions.   UPPER ABDOMEN Limited imaging through the upper abdomen reveals no abnormalities of the visualized organs.       1. Mildly dilated LV with normal systolic function (LVEF 55%). 2. Moderate-large pericardial effusion (max 2.1 cm posterolaterally). 3. Thickened pericardium with increased STIR signal and LGE consistent with pericarditis. There is evidence of respirophasic septal shift suggestive of constrictive physiology. 4. No evidence of myocardial inflammation/edema on T2 weighted imaging. 5. No evidence of myocardial infarction or infiltration on LGE imaging. 6. Mildly dilated RV with preserved systolic function. 7. Small bilateral pleural effusions.   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   MACRO: Table 1 Left ventricular parameters for adult men and women by age group, papillary muscles included in left ventricular mass       LVESV/BSA (ml/m2) * : 14-53 * : 15-46 * : 13-50 * : 12-45 * 57-99: 13-46   LVSV/BSA (ml/m2) * : 40-68 * : 34-67 * : 36-68 * : 30-69 * 57-99: 34-63   LVEF (%) * : 46-74 * : 49-77 * : 48-76 * : 49-78 * 57-99: 48-76   LVM/BSA (g/m2) * : 44-87 * : 41-86 * : 43-84 * : 42-83 * 57-99: 38-87         LVESV/BSA (ml/m2) * : 16-43 * : 9-49 * 50-96: 12-42 * 48-89: 10-38 * 51-84: 14-35   LVSV/BSA (ml/m2) * : 38-63 * : 34-64 * 50-96: 32-64 * 48-89: 34-59 * 51-84: 31-58   LVEF (%) * : 50-73 * : 52-77 * 50-96: 50-76 * 48-89: 52-78 * 51-84: 53-77   LVM/BSA (g/m2) * : 29-72 * : 32-68 * 50-96: 32-66 * 48-89: 31-70 * 51-84: 31-74       Reference for heart dimensions for report: https://jcmr-online.biomedcentral.com/articles/[email or web address]/[email or web address]   Signed  by: Israel Saenz 11/7/2023 5:14 AM Dictation workstation:   [id number]    Transthoracic Echo (TTE) Limited    Result Date: 11/4/2023   [facility], [address] [place] [address]                Tel [phone] and Fax [phone] TRANSTHORACIC ECHOCARDIOGRAM REPORT  Patient Name:      SEHLBY GUPTA JEFF       Reading Physician:    [id number] Rand MD Study Date:        11/4/2023            Ordering Provider:    [id number] NAOMI MARIA MRN/PID:           [id number]             Fellow: Accession#:        [id number]         Nurse: Date of Birth/Age: 1990 / 33      Sonographer:          Jo Ann Jenkins RDCS                    years Gender:            M                    Additional Staff: Height:            195.58 cm            Admit Date:           11/1/2023 Weight:            106.14 kg            Admission Status:     Inpatient -                                                               Routine BSA:               2.39 m2              Encounter#:           [id number]                                         Department Location:  [place] [address] Blood Pressure: 145 /79 mmHg Study Type:    TRANSTHORACIC ECHO (TTE) LIMITED Diagnosis/ICD: Other pericardial effusion (noninflammatory)-I31.39; Atrial                septal defect, unspecified-Q21.10 Indication:    Pericardial effusion; Pericarditis associated with atrial septal                defect CPT Code:      Echo Limited-21786; Color Doppler-03865; Doppler Limited-25252 Patient History: Pertinent History: Residual large pericardial effusion post centesis. Study Detail: The following Echo studies were performed: 2D, M-Mode, Doppler and               color flow. Technically challenging study due to prominent lung               artifact.  PHYSICIAN  INTERPRETATION: Left Ventricle: The left ventricular systolic function is normal, with an estimated ejection fraction of 55%. There are no regional wall motion abnormalities. The left ventricular cavity size is normal. Left ventricular diastolic filling was not assessed. Left Atrium: The left atrium is normal in size. Right Ventricle: The right ventricle is normal in size. There is normal right ventricular global systolic function. Right Atrium: The right atrium is normal in size. Aortic Valve: The aortic valve is trileaflet. Aortic valve regurgitation was not assessed. Mitral Valve: The mitral valve is normal in structure. Mitral valve regurgitation was not assessed. Tricuspid Valve: The tricuspid valve is structurally normal. Tricuspid regurgitation was not assessed. Pulmonic Valve: The pulmonic valve was not assessed. Pulmonic valve regurgitation was not assessed. Pericardium: There is a moderately sized pericardial effusion. There is no evidence of cardiac tamponade. Pleural: There is a moderate left pleural effusion. Aorta: The aortic root is normal. Systemic Veins: The inferior vena cava appears dilated. There is less than 50% IVC collapse with inspiration. In comparison to the previous echocardiogram(s):  CONCLUSIONS:  1. Left ventricular systolic function is normal with a 55% estimated ejection fraction.  2. There is a moderate pericardial effusion.  3. There is a moderate pleural effusion.  4. There is no evidence of cardiac tamponade. QUANTITATIVE DATA SUMMARY: LV SYSTOLIC FUNCTION BY 2D PLANIMETRY (MOD):                     Normal Ranges: EF-A4C View: 47.0 % (>=55%) EF-A2C View: 56.3 % EF-Biplane:  51.7 % TRICUSPID VALVE/RVSP:                   Normal Ranges: IVC Diam: 2.60 cm  [id number] Sunny Rand MD Electronically signed on 11/4/2023 at 3:16:42 PM  ** Final **     Transthoracic Echo (TTE) Limited    Result Date: 11/3/2023   [facility] [address] [place] [address]                 Tel [phone] and Fax [phone] TRANSTHORACIC ECHOCARDIOGRAM REPORT  Patient Name:      SHELBY AGUILAR       Reading Physician:    [id number] Daniel Matos MD Study Date:        11/3/2023            Ordering Provider:    [id number] ROSLYN HUSSEIN MRN/PID:           [id number]             Fellow: Accession#:        [id number]         Nurse: Date of Birth/Age: 1990 / 33      Sonographer:          Jo Ann Jenkins RDCS                    years Gender:            M                    Additional Staff: Height:            195.00 cm            Admit Date:           11/1/2023 Weight:            106.00 kg            Admission Status:     Inpatient -                                                               Routine BSA:               2.38 m2              Encounter#:           [id number]                                         Department Location:  [place] [address] Blood Pressure: 105 /66 mmHg Study Type:    TRANSTHORACIC ECHO (TTE) LIMITED Diagnosis/ICD: Other pericardial effusion (noninflammatory)-I31.39 Indication:    Pericardial effusion CPT Code:      Echo Limited-79858; Color Doppler-94880; Doppler Limited-91674 Patient History: Pertinent History: A-Fib. residual large pericardial effusion post                    pericardiocentesis, elevated troponin. Study Detail: The following Echo studies were performed: 2D, M-Mode, Doppler and               color flow. Technically challenging study due to patient lying in               supine position.  PHYSICIAN INTERPRETATION: Left Ventricle: The left ventricular systolic function is low normal, with an estimated ejection fraction of 50-55%. There are no regional wall motion abnormalities. The left ventricular cavity size is normal. Left ventricular diastolic filling was not assessed. Left  Atrium: The left atrium is normal in size. Right Ventricle: The right ventricle is normal in size. There is mildly reduced right ventricular systolic function. Right Atrium: The right atrium is normal in size. Aortic Valve: The aortic valve appears structurally normal. Aortic valve regurgitation was not assessed. Mitral Valve: The mitral valve is normal in structure. There is trace mitral valve regurgitation. Tricuspid Valve: The tricuspid valve is structurally normal. Tricuspid regurgitation was not assessed. The right ventricular systolic pressure is unable to be estimated. Pulmonic Valve: The pulmonic valve is not well visualized. Pulmonic valve regurgitation was not assessed. Pericardium: There is a large pericardial effusion. The effusion is circumferential. The pericardial effusion appears to contain focal strands. There is no evidence of cardiac tamponade. Although circumferetial pericardial effusion, maxium diamter is around the LV posterior wall with lots of focal strands and septation specially in the effusion noted posteriorly. Aorta: The aortic root is normal. Systemic Veins: The inferior vena cava appears dilated. There is less than 50% IVC collapse with inspiration. The hepatic vein shows a systolic blunting flow pattern. In comparison to the previous echocardiogram(s): Compared with study from 11/2/2023, no significant change. Pericardial effusionss appear to be similar.  CONCLUSIONS:  1. Left ventricular systolic function is low normal with a 50-55% estimated ejection fraction.  2. There is mildly reduced right ventricular systolic function.  3. There is a large pericardial effusion.  4. There is no evidence of cardiac tamponade.  5. Although circumferetial pericardial effusion, maxium diamter is around the LV posterior wall with lots of focal strands and septation specially in the effusion noted posteriorly.  6. Compared with study from 11/2/2023, no significant change. Pericardial effusionss appear  to be similar. QUANTITATIVE DATA SUMMARY: 2D MEASUREMENTS:                          Normal Ranges: IVSd:          1.10 cm   (0.6-1.1cm) LVPWd:         0.70 cm   (0.6-1.1cm) LVIDd:         5.70 cm   (3.9-5.9cm) LVIDs:         3.90 cm LV Mass Index: 82.8 g/m2 LV % FS        31.6 %  RIGHT VENTRICLE: RV Basal 4.50 cm TRICUSPID VALVE/RVSP:                   Normal Ranges: IVC Diam: 2.20 cm  [id number] Daniel Matos MD Electronically signed on 11/3/2023 at 5:16:31 PM  ** Final **     Cardiac catheterization - non-coronary    Result Date: 11/2/2023   [facility], Cath Lab, [address] [place] [address] Cardiovascular Catheterization Report Patient Name:      SHELBY AGUILAR        Performing Physician:  Justyna Rich MD Study Date:        11/2/2023             Verifying Physician:   Justyna Rich MD MRN/PID:           [id number]              Cardiologist/Co-scrub: Accession#:        [id number]          Ordering Physician:    Lissy FLOREZ Date of Birth/Age: 1990 / 33 years Fellow:                Lissy Florez MD Gender:            M                     Fellow: Encounter#:        [id number]  Study: Pericardiocentesis  Indications: Large pericardial effusion.  Pericardiocentesis: The area was prepped and draped and infiltrated with local anesthetic, a long thin walled needle was advanced into the pericardial space until fluid was aspirated and over a floppy wire, the needle was replaced with a catheter. A total of 340 ml of brown fluid was aspirated. The catheter was left in the pericardial space and connected to a sterile bag system for drainage. The catheter was sutured in place and a dressing was applied. Mr Aguilar is a  33 year old man who was noted to have a large pericardial effusion after a respiratory illness. He is here for pericardiocentesis. The fluid was noted to be loculated on transthoracic echocardiogram. The pericardial space was accessed in the apical region over the left chest. Agitated saline injection verified pericardial access. A 9F sheath was placed and through this the 8.5F pigtail catheter. Using a wire and the pigtail the loculation were broken as much as feasible. Ultimately 340cc of serosanguinous fluid was drained. This sheath and pericardial drain were sutured to the sourrounding skin. There remained a localized multiseptate pocekt over the LV posteriorly. Attempts to access this safely was not successful. The procedure was well tolerated without intraprocedural complications.  Hemo Personnel: +-------------------+---------+ Name               Duty      +-------------------+---------+ Jazmyn Rich MD 1 +-------------------+---------+  Cardiac Cath Post Procedure Notes: Post Procedure           Pericardial effusion. Diagnosis: Blood Loss:              Estimated blood loss during the procedure was 0 mls. Specimens Removed:       Number of specimen(s) removed: 340 ml (pericardial                          fluid).  Recommendations: Antibiotic (Ancef) for 24 hours post procedure. Consider cardiac MRI after pericardial drain is pulled.  ICD 10 Codes: Other pericardial effusion (noninflammatory)-I31.39  CPT Codes: Pericardial drainage w/insertion indwelling catheter, w/wo fluoro, w/wo US guidance, >6 yrs, w/o congenital anomaly-59494; Moderate Sedation Services 1st additional 15 minutes patient >5 years-06817; Moderate Sedation Services 2nd additional 15 minutes patient >5 years-68749  [id number] Jazmyn Rich MD Performing Physician Electronically signed by [id number] Jazmyn Rich MD on 11/2/2023 at 8:21:56 PM  ** Final **     Transthoracic Echo (TTE) Limited    Result Date: 11/2/2023   [facility]  [place], [address] [place] [address]                Tel [phone] and Fax [phone] TRANSTHORACIC ECHOCARDIOGRAM REPORT  Patient Name:      SHELBY GUPTA PANCHOPANCHO       Reading Physician:    [id number] Cirilo Jain MD Study Date:        11/2/2023            Ordering Provider:    [id number] NAOMI GUPTA                                                               CROW MRN/PID:           [id number]             Fellow: Accession#:        [id number]         Nurse: Date of Birth/Age: 1990 / 33      Sonographer:          Bev Kirby RDCS                    years Gender:            M                    Additional Staff: Height:            195.58 cm            Admit Date: Weight:            106.00 kg            Admission Status:     Inpatient -                                                               Routine BSA:               2.39 m2              Encounter#:           [id number]                                         Department Location:  [facility]                                                               Cath Lab Blood Pressure: 152 /61 mmHg Study Type:    TRANSTHORACIC ECHO (TTE) LIMITED Diagnosis/ICD: Other pericardial effusion (noninflammatory)-I31.39 Indication:    pericardiocentesis CPT Code:      Echo Limited-77254 Patient History: Pertinent History: Pericarditis. Study Detail: The following Echo studies were performed: 2D, M-Mode, Doppler and               color flow.  PHYSICIAN INTERPRETATION: Left Ventricle: The left ventricular systolic function is normal, with an estimated ejection fraction of 60-65%. There are no regional wall motion abnormalities. The left ventricular cavity size is normal. Left ventricular diastolic filling was not assessed. Left Atrium: The left atrium is normal in size. A bubble study using agitated saline was performed. Bubble study is negative. Right Ventricle: The right ventricle is normal in size. There  is normal right ventricular global systolic function. Right Atrium: The right atrium is normal in size. Aortic Valve: The aortic valve is trileaflet. Aortic valve regurgitation was not assessed. Mitral Valve: The mitral valve is normal in structure. Mitral valve regurgitation was not assessed. Tricuspid Valve: The tricuspid valve is structurally normal. Tricuspid regurgitation was not assessed. The right ventricular systolic pressure is unable to be estimated. Pulmonic Valve: The pulmonic valve was not assessed. Pulmonic valve regurgitation was not assessed. Pericardium: There is a large pericardial effusion. The pericardial effusion appears to contain fibrinous material and appears to contain focal strands. After removal of 350 cc, ther is a residual large pericardial loculation adjacent to the lateral LV wall containing many bridging fibrous strands. Pleural: There is left pleural effusion. Aorta: The aortic root was not well visualized. In comparison to the previous echocardiogram(s): Although previous studies are available for review, their comparison with the current echocardiogram is clinically irrelevant.  CONCLUSIONS:  1. Left ventricular systolic function is normal with a 60-65% estimated ejection fraction.  2. There is a large pericardial effusion.  3. After removal of 350 cc, ther is a residual large pericardial loculation adjacent to the lateral LV wall containing many bridging fibrous strands. QUANTITATIVE DATA SUMMARY:  [id number] Cirilo Jain MD Electronically signed on 11/2/2023 at 5:55:43 PM  ** Final **     Transthoracic echo (TTE) limited    Result Date: 11/1/2023              [place] [id number]      Phone [phone] Fax [phone] TRANSTHORACIC ECHOCARDIOGRAM REPORT  Patient Name:      SHELBY AGUILAR       Reading Physician:    [id number] Neal Grant MD Study Date:        10/31/2023            Ordering Provider:    [id number] JAQUELINE SHEPPARD MRN/PID:           [id number]             Fellow: Accession#:        [id number]         Nurse: Date of Birth/Age: 1990 / 33      Sonographer:          Rossana Miller RDCS                    years Gender:            M                    Additional Staff: Height:            195.58 cm            Admit Date: Weight:            106.14 kg            Admission Status:     Outpatient BSA:               2.39 m2              Department Location:  [facility]                                                               Lab Blood Pressure: 131 /72 mmHg Study Type:    TRANSTHORACIC ECHO (TTE) LIMITED Diagnosis/ICD: Other pericardial effusion (noninflammatory)-I31.39 Indication:    Pericardial Effusion CPT Codes:     Echo Limited-86397; Doppler Limited-04630; Color Doppler-09872 Patient History: Pertinent History: Pericardial eff and A-Fib. Study Detail: The following Echo studies were performed: 2D, M-Mode, Doppler and               color flow.  PHYSICIAN INTERPRETATION: Left Ventricle: Left ventricular systolic function is low normal, with an estimated ejection fraction of 50%. There are no regional wall motion abnormalities. The left ventricular cavity size is normal. Left ventricular diastolic filling was not assessed. Left Atrium: The left atrium was not assessed. Right Ventricle: The right ventricle was not assessed. Right ventricular systolic function not assessed. Right Atrium: The right atrium was not assessed. Aortic Valve: The aortic valve was not assessed. Aortic valve regurgitation was not assessed. Mitral Valve: The mitral valve was not assessed. Mitral valve regurgitation was not assessed. Tricuspid Valve: The tricuspid valve was not assessed. Tricuspid regurgitation was not assessed. Pulmonic Valve: The pulmonic valve was not assessed. The pulmonic valve regurgitation was not assessed.  Pericardium: There is a moderate to large pericardial effusion. The effusion is circumferential. The pericardial effusion appears to contain focal strands and appears to contain fibrinous material. There is no evidence of cardiac tamponade. There is inferior vena caval plethora and >25% respiratory variation across TV inflow. Dilated IVC without collapse. Aorta: The aortic root was not assessed. Systemic Veins: The inferior vena cava appears dilated. In comparison to the previous echocardiogram(s): The pericardial effusion is unchanged as compared to prior study on 10/25/2023.  CONCLUSIONS:  1. Left ventricular systolic function is low normal with a 50% estimated ejection fraction.  2. Moderate to large pericardial effusion.  3. There is no evidence of cardiac tamponade. QUANTITATIVE DATA SUMMARY: 2D MEASUREMENTS:                          Normal Ranges: IVSd:          1.27 cm   (0.6-1.1cm) LVPWd:         1.11 cm   (0.6-1.1cm) LVIDd:         4.89 cm   (3.9-5.9cm) LV Mass Index: 92.9 g/m2 LV SYSTOLIC FUNCTION BY 2D PLANIMETRY (MOD):                     Normal Ranges: EF-A4C View: 42.2 % (>=55%) EF-A2C View: 41.8 % EF-Biplane:  43.8 % TRICUSPID VALVE/RVSP:                   Normal Ranges: IVC Diam: 2.45 cm  [id number] Neal Grant MD Electronically signed on 11/1/2023 at 12:10:41 PM  ** Final **     CT angio chest for pulmonary embolism    Result Date: 10/31/2023  Interpreted By:  Parker Jones, STUDY: CT ANGIO CHEST FOR PULMONARY EMBOLISM;  10/31/2023 8:39 pm   INDICATION: Signs/Symptoms:Chest pain.   COMPARISON: Chest radiography from 10/31/2023.   ACCESSION NUMBER(S): [id number]   ORDERING CLINICIAN: RAKESH MEADE   TECHNIQUE: Helical data acquisition of the chest was obtained after intravenous administration of 50 ccOmnipaque 350, as per PE protocol. Images were reformatted in coronal and sagittal planes. Axial and coronal maximum intensity projection (MIP) images were created and reviewed.   FINDINGS: POTENTIAL  LIMITATIONS OF THE STUDY: None   HEART AND VESSELS: There are no discrete filling defects within main pulmonary artery and its branches to suggest acute pulmonary embolism. Main pulmonary artery and its branches are normal in caliber.   The thoracic aorta normal in course and caliber. No coronary artery calcifications are seen. Please note, the study is not optimized for evaluation of coronary arteries.   The cardiac chambers are not enlarged.   There is large pericardial effusion present.Correlate clinically with concern for cardiac tamponade.   MEDIASTINUM AND EJSSICA, LOWER NECK AND AXILLA: The visualized thyroid gland is within normal limits. No evidence of thoracic lymphadenopathy by CT criteria. Esophagus appears within normal limits as seen.   LUNGS AND AIRWAYS: The trachea and central airways are patent. No endobronchial lesion is seen.   Posterior bibasilar airspace opacities may relate to atelectasis or pneumonia. Otherwise, lungs are grossly clear. Small pleural effusions, slightly larger on the right. No pneumothorax.     UPPER ABDOMEN: The visualized subdiaphragmatic structures demonstrate no remarkable findings.       CHEST WALL AND OSSEOUS STRUCTURES: Chest wall is within normal limits. No acute osseous pathology.There are no suspicious osseous lesions.       No evidence of acute pulmonary embolism.   Posterior bibasilar airspace opacities may relate to atelectasis or pneumonia. Otherwise, lungs are grossly clear. Small pleural effusions, slightly larger on the right. No pneumothorax.   There is large pericardial effusion present.Correlate clinically with concern for cardiac tamponade.   Additional findings as discussed above.   MACRO: None   Signed by: Parker Jones 10/31/2023 9:12 PM Dictation workstation:   [id number]    XR chest 2 views    Result Date: 10/31/2023  Interpreted By:  Rogelio Elizabeth, STUDY: XR CHEST 2 VIEWS   INDICATION: Signs/Symptoms:Fever, recent hospitalization.   COMPARISON: October 24,  2023   ACCESSION NUMBER(S): [id number]   ORDERING CLINICIAN: RAKESH MEADE   FINDINGS: Cardiomegaly with interval development of mild basilar interstitial prominence and small effusions suggesting CHF.   Component of superimposed pneumonia particularly at the left lung base not excluded.   No evidence of pneumothorax.       Cardiomegaly with interval development of mild basilar interstitial prominence and small effusions suggesting CHF.   Component of superimposed pneumonia particularly at the left lung base not excluded.   Signed by: Rogelio Elizabeth 10/31/2023 6:31 PM Dictation workstation:   [id number]    Transthoracic Echo (TTE) Complete    Result Date: 10/25/2023              [place] [address]      Phone [phone] Fax [phone] TRANSTHORACIC ECHOCARDIOGRAM REPORT  Patient Name:      SHELBY AGUILAR       Reading Physician:    [id number] Neal Grant MD Study Date:        10/25/2023           Ordering Provider:    [id number] ERIC BARRON MRN/PID:           [id number]             Fellow: Accession#:        [id number]         Nurse: Date of Birth/Age: 1990 / 33      Sonographer:          Louise Ramirez RDCS                    years Gender:            M                    Additional Staff: Height:            195.58 cm            Admit Date:           10/24/2023 Weight:            106.60 kg            Admission Status:     Inpatient -                                                               Routine BSA:               2.40 m2              Department Location:  [facility]                                                               Lab Blood Pressure: 121 /81 mmHg Study Type:    TRANSTHORACIC ECHO (TTE) COMPLETE Diagnosis/ICD: Unspecified atrial fibrillation-I48.91 Indication:    Atrial Fibrillation CPT Codes:     Echo  Complete w Full Doppler-07871 Patient History: No pertinent past medical history. Study Detail: The following Echo studies were performed: 2D, M-Mode, Doppler and               color flow.  PHYSICIAN INTERPRETATION: Left Ventricle: Left ventricular systolic function is low normal, with an estimated ejection fraction of 50-55%. There are no regional wall motion abnormalities. The left ventricular cavity size is normal. Left ventricular diastolic filling was indeterminate. Left Atrium: The left atrium is normal in size. Right Ventricle: The right ventricle is normal in size. There is reduced right ventricular systolic function. Right Atrium: The right atrium is normal in size. Aortic Valve: The aortic valve is trileaflet. There is no evidence of aortic valve regurgitation. The peak instantaneous gradient of the aortic valve is 6.7 mmHg. The mean gradient of the aortic valve is 3.0 mmHg. Mitral Valve: The mitral valve is normal in structure. There is no evidence of mitral valve regurgitation. Tricuspid Valve: The tricuspid valve is structurally normal. There is trace tricuspid regurgitation. Pulmonic Valve: The pulmonic valve is structurally normal. There is no indication of pulmonic valve regurgitation. Pericardium: There is a moderate to large pericardial effusion. There is no evidence of cardiac tamponade. Aorta: The aortic root is normal.  CONCLUSIONS:  1. Left ventricular systolic function is low normal with a 50-55% estimated ejection fraction.  2. There is reduced right ventricular systolic function.  3. Moderate to large pericardial effusion.  4. There is no evidence of cardiac tamponade. QUANTITATIVE DATA SUMMARY: 2D MEASUREMENTS:                          Normal Ranges: Ao Root d:     3.20 cm   (2.0-3.7cm) LAs:           4.20 cm   (2.7-4.0cm) IVSd:          1.00 cm   (0.6-1.1cm) LVPWd:         1.00 cm   (0.6-1.1cm) LVIDd:         4.40 cm   (3.9-5.9cm) LVIDs:         3.20 cm LV Mass Index: 61.7 g/m2 LV % FS         27.3 % LA VOLUME:                               Normal Ranges: LA Vol A4C:        45.0 ml    (22+/-6mL/m2) LA Vol A2C:        45.4 ml LA Vol BP:         50.6 ml LA Vol Index A4C:  18.8ml/m2 LA Vol Index A2C:  18.9 ml/m2 LA Vol Index BP:   21.1 ml/m2 LA Area A4C:       18.4 cm2 LA Area A2C:       16.5 cm2 LA Major Axis A4C: 6.4 cm LA Major Axis A2C: 5.1 cm LA Volume Index:   21.1 ml/m2 RA VOLUME BY A/L METHOD:                       Normal Ranges: RA Area A4C: 16.4 cm2 AORTA MEASUREMENTS:                      Normal Ranges: Ao Sinus, d: 3.20 cm (2.1-3.5cm) Ao STJ, d:   2.70 cm (1.7-3.4cm) Asc Ao, d:   2.80 cm (2.1-3.4cm) LV SYSTOLIC FUNCTION BY 2D PLANIMETRY (MOD):                     Normal Ranges: EF-A4C View: 49.1 % (>=55%) EF-A2C View: 47.8 % EF-Biplane:  46.3 % LV DIASTOLIC FUNCTION:                        Normal Ranges: MV Peak E:    0.83 m/s (0.7-1.2 m/s) MV e'         0.11 m/s (>8.0) MV lateral e' 0.10 m/s MV medial e'  0.12 m/s E/e' Ratio:   7.58     (<8.0) AORTIC VALVE:                                   Normal Ranges: AoV Vmax:                1.29 m/s (<=1.7m/s) AoV Peak P.7 mmHg (<20mmHg) AoV Mean PG:             3.0 mmHg (1.7-11.5mmHg) LVOT Max Red:            0.83 m/s (<=1.1m/s) AoV VTI:                 20.00 cm (18-25cm) LVOT VTI:                16.30 cm LVOT Diameter:           2.40 cm  (1.8-2.4cm) AoV Area, VTI:           3.69 cm2 (2.5-5.5cm2) AoV Area,Vmax:           2.92 cm2 (2.5-4.5cm2) AoV Dimensionless Index: 0.82  RIGHT VENTRICLE: RV Basal 3.20 cm RV Mid   2.70 cm RV Major 8.2 cm TAPSE:   16.5 mm RV s'    0.15 m/s TRICUSPID VALVE/RVSP:                   Normal Ranges: IVC Diam: 1.80 cm  [id number] Neal Grant MD Electronically signed on 10/25/2023 at 9:05:44 AM  ** Final **     XR chest 1 view    Result Date: 10/24/2023  Interpreted By:  Claudio Toscano, STUDY: XR CHEST 1 VIEW;  10/24/2023 10:05 am   INDICATION: Signs/Symptoms:arrythmia.   COMPARISON: None.   ACCESSION  NUMBER(S): [id number]   ORDERING CLINICIAN: TANYA NAGY   TECHNIQUE: Single AP portable view of the chest was obtained.   FINDINGS: MEDIASTINUM/ LUNGS/ JESSICA: Mild cardiomegaly, currently without vascular congestion, or pleural effusion. No abnormal opacity in either lung worrisome for tumor or pneumonia. No pneumothorax. No tracheal deviation. No abnormal hilar fullness or gross mass on either side.   BONES: No lytic or blastic destructive bone lesion.   UPPER ABDOMEN: Grossly intact.       Mild cardiomegaly.  Currently without radiographic evidence of CHF or pneumonia.   Signed by: Claudio Toscano 10/24/2023 10:18 AM Dictation workstation:   [id number]        Assessment/Plan   Mr. Jonah Britton is a 34 yo M with PMHx of pericarditis and pAF, who presents to  ED with recurrent fevers I/s/o previously known pericarditis and pericardial effusion s/p pericardiocentesis. Pt continuing to experience daily fevers with drenching night sweats. Likely related to known pericarditis. On review of previous labs, cocksackie virus A positive, likely trigger for pericarditis. Repeat echo ordered and consulted Cardiology. HDS stable at this time without evidence of tamponade.     Updates 11/18:   - Cardiology consult  - f/up TTE   - scheduled ibuprofen      #Fevers  #Pericarditis  - Continue home colchicine, ibuprofen, and Tylenol  - Formal echo ordered  - Continue to monitor for signs of tamponade  - F/u CMV, HSV, EBV, HHV  - F/up Bcx  - Cardiology consulted, appreciate recs      #Hx of pAF  - Continue metoprolol succinate 50mg daily  - Not currently on AC  - Monitor on telemetry     #Left pleural effusion    - likely iso recent procedures  - flu and covid negative  - VBG/ lactate wnl  - CTM VS, patient satting well on RA, no current SOB     F: bolus PRN  E: replete PRN  N: regular  A: PIV    DVT PPx: SCDs, low score  GI PPx: ppi     CODE STATUS: Full Code (confirmed on admission)  SURROGATE DECISION MAKER: Elvia  Calcedinson (wife) [phone]; Dayna Britton (mom) [phone]         Lance Lentz MD

## 2023-11-18 NOTE — HOSPITAL COURSE
Jonah Britton is a 32 yo M with PMHx of pericarditis and pAF, who presents to  ED with recurrent fevers I/s/o previously known pericarditis and pericardial effusion s/p pericardiocentesis. Pt continuing to experience daily fevers with drenching night sweats on admission. Likely related to known pericarditis. On review of previous labs, cocksackie virus A positive, likely trigger for pericarditis. On admission, repeat echo ordered and consulted Cardiology. Admission CXR significant for left sided pleural effusion likely iso recent pericardiocentesis. Patient home meds including colchicine, ibuprofen, metoprolol succinate 50mg daily and Tylenol started.  TTE 11/18 with LVEF 55%, small to moderate pericardial effusion appearing to contain fibrinous material, thickening of the pericardium lateral to the left ventricle, no evidence constrictive pericarditis. The IVC is no longer dilated and continue to have >50% inspiratory collapse. CMV, HSV, EBV returned negative. HHV6 and 8 pending results.   PET scan performed 11/20 to evaluate for potential malignant processes due to patient presentation with B symptoms, which showed Nonspecific mildly hypermetabolic nonenlarged right supraclavicular and subcarinal lymph nodes which may be reactive. Hypermetabolic small to moderate pericardial effusion which is decreased in size from prior 10/31/2023 CTA. Increased moderate left pleural effusion with associated atelectasis. Previously seen small right pleural effusion and associated atelectasis/opacities have improved. Borderline splenomegaly without associated hypermetabolic activity to suggest lymphomatous involvement.   Genetics consulted 11/20 to evaluate for MEFV mutation per cardiology recommendations. Patient deferred any genetic evaluation at this time. Thoracentesis performed 11/20 for further evaluation of left sided pleural effusion with results that showed 4+ PMNs on gram stain, 12% unclassified cells, 2560 WBC, and  29022 RBC. Per Light's Criteria, patient with exudative effusion (98% sensitive per calculator). Exudative effusion thought to be infectious/viral iso known coxsackie virus infection. Patient discharged in stable condition and arranged for close outpatient follow up with cardiology and PCP.

## 2023-11-18 NOTE — H&P
History Of Present Illness  Jonah Britton is a 33 y.o. male with PMH recently diagnosed pAF and pericarditis presenting to  with recurrent fevers.  Pt states his symptoms began mid October after he had a URI. He was initially seen in the  ED on 10/24 with fevers, diaphoresis and pleuritic chest pain. At time of presentation he was tachycardic up to 151 and EKG showing afib with RVR. An echo at that time showed an EF of 50-55% with mod-severe pericardial effusion. Cardiology was consulted, and the pt was started on colchicine and ibuprofen for pericarditis, and he was started on metoprolol tart 25 BID for rate control. He was discharged with f/u with Cardiology in 1 week.     5 days after discharge, however, pt represented to the ED with continued chest pain and fever. Troponin was negative but he had an elevated D-dimer to 664. CTA chest at that time was negative for PE. He had a pericardiocentesis on 11/2 with 350cc removed. The effusion was loculated and septated and could not be drained further. 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     ID was consulted and cytology was sent showing no evidence of malignancy or infection. Pt with leukocytosis of 13k, and periodic fevers. Initially placed on doxycycline for possible PNA, however was d/c. He was discharged and instructed to report back to the ED with any recurrence of fever.     Pt remained in SR throughout hospital admission. Due to his [id number] of 0, he was not started on anticoagulation.      Since that time, pt reports he initially seemed to improve, however he noticed worsening symptoms that started 2 days ago. Earlier this week he was able to walk approximately 2 miles, however beginning on 11/15 PM he began noticing worsening fevers, initially starting at 5pm and then moving up earlier in the day, as well as muscle aches and drenching night sweats.      He states that he feels OK currently, however this morning he notes  he had a fever up to 101.5.      He also notes he feels “uncomfortable” lying flat, and has been using 2 pillows. Denies any LE edema. Does endorse malaise and general body aches. Denied weight loss of 5lb as patient frequently NPO d/t being in and out of hospitals/Eds.     ED Course:  VS: T 36.8  /70 HR 93 RR 16 O2 98 on RA     Labs:  CBC WBC 17.4 Hb 13.0 Plt 581  RFP Na 134 K 4.1 Cl 95 HCO3 28 BUN 13 SCr 0.9 Glc 103 Ca 9.7  LFTs AST/ALT 29/13 Alk Phos 91 T Bili 0.7 Alb 4.6 TProt 7.9  COVID Negative  Troponin: 6  BNP: 53  CRP: 26 (11.66)  ESR: 43 (93)  VB.43/41; lactate: 1.0        Labs from prior hospitalization  [place] A: 1:800           Imaging:   CXR 2023  FINDINGS:  The heart is not enlarged.  There is no evidence of acute pulmonary infiltrate.  There is a small left pleural effusion.  No right effusion is present  IMPRESSION:  SMALL LEFT PLEURAL EFFUSION.        cMRI 2023  IMPRESSION:  1. Mildly dilated LV with normal systolic function (LVEF 55%).  2. Moderate-large pericardial effusion (max 2.1 cm posterolaterally).  3. Thickened pericardium with increased STIR signal and LGE  consistent with pericarditis. There is evidence of respirophasic  septal shift suggestive of constrictive physiology.  4. No evidence of myocardial inflammation/edema on T2 weighted  imaging.  5. No evidence of myocardial infarction or infiltration on LGE  imaging.  6. Mildly dilated RV with preserved systolic function.  7. Small bilateral pleural effusions.     Past Medical History  None prior to recently diagnosed pAF and pericarditis    Please see Note Below from Patient's office visit with Dr. Saenz  for additional details on current presentation:  In summary, Mr Britton is a 32 yo man presenting for cardiology review of pericarditis and paroxysmal atrial fibrillation.  He was diagnosed with pericarditis in late October following a preceding upper respiratory tract infection.  He had no significant PMH  prior to this event. At the time of presentation he was also noted to be in atrial fibrillation that subsequently reverted spontaneously.  His initial echocardiogram showed normal left ventricular systolic function with a moderate-large pericardial effusion but no evidence of tamponade.  He was commenced on medical therapy with colchicine and ibuprofen but was readmitted to hospital with persistent symptoms of chest/back pain.  He subsequently underwent pericardiocentesis with partial resolution of his loculated pericardial effusion.  His cardiac MRI showed evidence of active pericarditis, a moderate-large pericardial effusion with mild respirophasic septal shift suggestive of possible effusive-constrictive pericarditis.  There was no evidence of myocarditis on CMR.  His autoimmune panel and pericardial cytology were negative.  As his inflammatory markers were downtrending during the admission with resolution of his chest pain he was discharged on colchicine/ibuprofen.  In clinic today he reports ongoing absence of chest pain however he has experienced some night sweats.  He has been able to walk for 1-2 miles without issue.  On examination his blood pressure was 122/70 mmHg and his heart rate was 61 bpm.  His heart sounds were dual with no audible pericardial rub.  There was no JVD and his EKG showed sinus rhythm without ST changes.  In light of his night sweats I have arranged for repeat lab tests including inflammatory markers.  If there is evidence of residual inflammation, he will be for consideration of transition from NSAIDs to prednisone.  I have also arranged for a repeat transthoracic echocardiogram to monitor his pericardial effusion.  I will follow-up with Jonah after his investigations.     Surgical History  Past Surgical History:   Procedure Laterality Date    CARDIAC CATHETERIZATION N/A 11/2/2023    Procedure: Pericardiocentesis;  Surgeon: Jazmyn Rich MD;  Location: [place] [address] [facility]iac [facility];  " Service: Cardiovascular;  Laterality: N/A;   No other past surgical history      Social History  Lives with wife at home. Independent ADLs.   Former cigarette smoker. Quit 3 years ago. Consumed ~1pack per week for 4-5 years. Was an active vape smoker before current medical problems.   Alcohol use on weekends. 6 drinks per day Friday-Sunday  Denies illicit drug use    Family History  Dad and paternal grandma with hypothyroidism. Same grandma with breast cancer diagnosed in her 70s.  No other family history of malignancy or heart disease.     Allergies  Patient has no known allergies.    Review of Systems  As above in HPI     Physical Exam  General: awake, alert, conversant, appears stated age  HENT: NCAT, PERRL, MMM  Eyes: no scleral icterus or conjunctival pallor  Skin: no suspect lesions or rashes noted on visible skin  Cardiac: RRR, normal S1, S2, no M/R/G, no friction rub appreciated  Pulm: Decreased breath sounds at L base  Abdomen: soft, ND, NT, no involuntary guarding or rebound tenderness  : no flank pain or indwelling urinary catheter  EXT: no peripheral edema, no asymmetry noted  MSK: no focal joint swelling noted, sensation and strength intact 5/5 in all extremities b/l  Neuro: AOx3, able to follow commands, no gross FND  Psych: coherent thought process, appropriate mood and affect      Last Recorded Vitals  Blood pressure 133/87, pulse 105, temperature 37 °C (98.6 °F), temperature source Oral, resp. rate 16, height 1.956 m (6' 5\"), weight 102 kg (225 lb), SpO2 97 %.    Results from last 7 days   Lab Units 11/17/23  0949   WBC AUTO x10*3/uL 17.4*   HEMOGLOBIN g/dL 13.0*   HEMATOCRIT % 41.0   PLATELETS AUTO x10*3/uL 581*     Results from last 7 days   Lab Units 11/17/23  0949   SODIUM mmol/L 134*   POTASSIUM mmol/L 4.1   CHLORIDE mmol/L 95*   CO2 mmol/L 28   BUN mg/dL 13   CREATININE mg/dL 0.90   CALCIUM mg/dL 9.7   PROTEIN TOTAL g/dL 7.9   BILIRUBIN TOTAL mg/dL 0.7   ALK PHOS U/L 91   ALT U/L 29   AST " U/L 13   GLUCOSE mg/dL 103*     Scheduled medications  colchicine, 0.6 mg, oral, BID  ibuprofen, 600 mg, oral, q12h  metoprolol succinate XL, 50 mg, oral, Daily  [START ON 11/18/2023] pantoprazole, 40 mg, oral, Daily before breakfast      Continuous medications     PRN medications  PRN medications: acetaminophen, sodium chloride 0.9%      Assessment/Plan   Principal Problem:    Fever, unspecified fever cause  Mr. Jonah Britton is a 34 yo M with PMHx of pericarditis and pAF, who presents to  ED with recurrent fevers. Pt continuing to experience daily fevers with drenching night sweats. Still may be related to pericarditis, however will send for additional infectious and rheumatologic labs. Interestingly, pt does endorse acute worsening in past week after seeming to improve, so unclear what would have caused that change in trajectory. Pt also endorsing what sounds like orthopnea, with increased pillows at night, although no reported SOB, LE edema, or chest pain.   Will order for repeat formal echo, although POCUS in ED reassuring. HDS stable at this time without evidence of tamponade. Will plan to consult Cardiology in AM for further evaluation and management. L pleural effusion seen on CXR, may be related to prior procedures. Will continuing monitoring respiratory status.      #Fevers  #Pericarditis  - Continue home colchicine, ibuprofen, and Tylenol  - Formal echo ordered  - Continue to monitor for signs of tamponade  - F/u CMV, HSV, EBV, HHV  - F/up Bcx  - consult cardiology in AM     #Hx of pAF  - Continue metoprolol succinate 50mg daily  - Not currently on AC  - Monitor on telemetry     #Left pleural effusion    - likely iso recent procedures  - flu and covid negative  - VBG/ lactate wnl  - CTM VS, patient satting well on RA, no current SOB    F: bolus PRN  E: replete PRN  N: regular  A: PIV    DVT PPx: SCDs, low score  GI PPx: ppi     CODE STATUS: Full Code (confirmed on admission)  SURROGATE DECISION MAKER:  Elvia Calcei (wife) [phone]; Dayna Calcei ([facility]) [phone]               Mirtha Chavez MD

## 2023-11-19 LAB
ALBUMIN SERPL BCP-MCNC: 4.2 G/DL (ref 3.4–5)
ANION GAP SERPL CALC-SCNC: 14 MMOL/L (ref 10–20)
BASOPHILS # BLD AUTO: 0.04 X10*3/UL (ref 0–0.1)
BASOPHILS NFR BLD AUTO: 0.5 %
BUN SERPL-MCNC: 12 MG/DL (ref 6–23)
CALCIUM SERPL-MCNC: 10 MG/DL (ref 8.6–10.6)
CCP IGG SERPL-ACNC: <1 U/ML
CHLORIDE SERPL-SCNC: 99 MMOL/L (ref 98–107)
CMV DNA SERPL NAA+PROBE-LOG IU: NORMAL {LOG_IU}/ML
CO2 SERPL-SCNC: 29 MMOL/L (ref 21–32)
CREAT SERPL-MCNC: 0.85 MG/DL (ref 0.5–1.3)
EBV DNA SPEC NAA+PROBE-LOG#: NORMAL {LOG_COPIES}/ML
EBV EA IGG SER QL: NEGATIVE
EBV NA AB SER QL: NEGATIVE
EBV VCA IGG SER IA-ACNC: NEGATIVE
EBV VCA IGM SER IA-ACNC: NORMAL
EOSINOPHIL # BLD AUTO: 0.23 X10*3/UL (ref 0–0.7)
EOSINOPHIL NFR BLD AUTO: 2.8 %
ERYTHROCYTE [DISTWIDTH] IN BLOOD BY AUTOMATED COUNT: 12.7 % (ref 11.5–14.5)
GFR SERPL CREATININE-BSD FRML MDRD: >90 ML/MIN/1.73M*2
GLUCOSE SERPL-MCNC: 79 MG/DL (ref 74–99)
HCT VFR BLD AUTO: 36.1 % (ref 41–52)
HGB BLD-MCNC: 11.2 G/DL (ref 13.5–17.5)
IMM GRANULOCYTES # BLD AUTO: 0.03 X10*3/UL (ref 0–0.7)
IMM GRANULOCYTES NFR BLD AUTO: 0.4 % (ref 0–0.9)
LABORATORY COMMENT REPORT: NOT DETECTED
LABORATORY COMMENT REPORT: NOT DETECTED
LYMPHOCYTES # BLD AUTO: 1.56 X10*3/UL (ref 1.2–4.8)
LYMPHOCYTES NFR BLD AUTO: 19.2 %
MAGNESIUM SERPL-MCNC: 2.28 MG/DL (ref 1.6–2.4)
MCH RBC QN AUTO: 26.9 PG (ref 26–34)
MCHC RBC AUTO-ENTMCNC: 31 G/DL (ref 32–36)
MCV RBC AUTO: 87 FL (ref 80–100)
MONOCYTES # BLD AUTO: 0.9 X10*3/UL (ref 0.1–1)
MONOCYTES NFR BLD AUTO: 11.1 %
NEUTROPHILS # BLD AUTO: 5.38 X10*3/UL (ref 1.2–7.7)
NEUTROPHILS NFR BLD AUTO: 66 %
NRBC BLD-RTO: 0 /100 WBCS (ref 0–0)
PHOSPHATE SERPL-MCNC: 4.6 MG/DL (ref 2.5–4.9)
PLATELET # BLD AUTO: 429 X10*3/UL (ref 150–450)
POTASSIUM SERPL-SCNC: 4.3 MMOL/L (ref 3.5–5.3)
RBC # BLD AUTO: 4.17 X10*6/UL (ref 4.5–5.9)
SODIUM SERPL-SCNC: 138 MMOL/L (ref 136–145)
WBC # BLD AUTO: 8.1 X10*3/UL (ref 4.4–11.3)

## 2023-11-19 PROCEDURE — 84520 ASSAY OF UREA NITROGEN: CPT

## 2023-11-19 PROCEDURE — 84155 ASSAY OF PROTEIN SERUM: CPT | Performed by: INTERNAL MEDICINE

## 2023-11-19 PROCEDURE — 36415 COLL VENOUS BLD VENIPUNCTURE: CPT

## 2023-11-19 PROCEDURE — 83735 ASSAY OF MAGNESIUM: CPT

## 2023-11-19 PROCEDURE — 2500000001 HC RX 250 WO HCPCS SELF ADMINISTERED DRUGS (ALT 637 FOR MEDICARE OP): Performed by: STUDENT IN AN ORGANIZED HEALTH CARE EDUCATION/TRAINING PROGRAM

## 2023-11-19 PROCEDURE — 99231 SBSQ HOSP IP/OBS SF/LOW 25: CPT

## 2023-11-19 PROCEDURE — 83615 LACTATE (LD) (LDH) ENZYME: CPT | Performed by: INTERNAL MEDICINE

## 2023-11-19 PROCEDURE — 36415 COLL VENOUS BLD VENIPUNCTURE: CPT | Performed by: INTERNAL MEDICINE

## 2023-11-19 PROCEDURE — 84443 ASSAY THYROID STIM HORMONE: CPT | Performed by: INTERNAL MEDICINE

## 2023-11-19 PROCEDURE — 2500000004 HC RX 250 GENERAL PHARMACY W/ HCPCS (ALT 636 FOR OP/ED): Performed by: STUDENT IN AN ORGANIZED HEALTH CARE EDUCATION/TRAINING PROGRAM

## 2023-11-19 PROCEDURE — 1200000002 HC GENERAL ROOM WITH TELEMETRY DAILY

## 2023-11-19 PROCEDURE — 86200 CCP ANTIBODY: CPT | Performed by: INTERNAL MEDICINE

## 2023-11-19 PROCEDURE — 85025 COMPLETE CBC W/AUTO DIFF WBC: CPT

## 2023-11-19 RX ADMIN — PANTOPRAZOLE SODIUM 40 MG: 40 TABLET, DELAYED RELEASE ORAL at 06:46

## 2023-11-19 RX ADMIN — ACETAMINOPHEN 975 MG: 325 TABLET ORAL at 12:52

## 2023-11-19 RX ADMIN — IBUPROFEN 600 MG: 600 TABLET ORAL at 16:28

## 2023-11-19 RX ADMIN — COLCHICINE 0.6 MG: 0.6 TABLET ORAL at 09:09

## 2023-11-19 RX ADMIN — COLCHICINE 0.6 MG: 0.6 TABLET ORAL at 21:11

## 2023-11-19 RX ADMIN — ACETAMINOPHEN 975 MG: 325 TABLET ORAL at 22:44

## 2023-11-19 RX ADMIN — METOPROLOL SUCCINATE 50 MG: 50 TABLET, EXTENDED RELEASE ORAL at 09:09

## 2023-11-19 RX ADMIN — Medication 3 MG: at 22:43

## 2023-11-19 RX ADMIN — IBUPROFEN 600 MG: 600 TABLET ORAL at 03:34

## 2023-11-19 ASSESSMENT — PAIN SCALES - GENERAL
PAINLEVEL_OUTOF10: 3
PAINLEVEL_OUTOF10: 0 - NO PAIN
PAINLEVEL_OUTOF10: 1
PAINLEVEL_OUTOF10: 0 - NO PAIN
PAINLEVEL_OUTOF10: 1

## 2023-11-19 ASSESSMENT — PAIN - FUNCTIONAL ASSESSMENT
PAIN_FUNCTIONAL_ASSESSMENT: 0-10

## 2023-11-19 NOTE — CARE PLAN
Problem: Safety  Goal: Patient will be injury free during hospitalization  Outcome: Progressing     Problem: Daily Care  Goal: Daily care needs are met  Outcome: Progressing     Problem: Psychosocial Needs  Goal: Demonstrates ability to cope with hospitalization/illness  Outcome: Progressing     Problem: Psychosocial Needs  Goal: Collaborate with me, my family, and caregiver to identify my specific goals  Outcome: Progressing   The patient's goals for the shift include      The clinical goals for the shift include Pt will remain hemodynamically stable during this shift.

## 2023-11-19 NOTE — PROGRESS NOTES
Jonah Britton is a 33 y.o. male on day 1 of admission presenting with Fever, unspecified fever cause.    Subjective   Interval History: Patient seen at bedside this morning. Feeling well, no fevers overnight.        Review of Systems    Objective   Range of Vitals (last 24 hours)  Heart Rate:  []   Temp:  [36.2 °C (97.2 °F)-37.2 °C (99 °F)]   Resp:  [18]   BP: (121-137)/(74-81)   SpO2:  [95 %-99 %]   Daily Weight  11/17/23 : 102 kg (225 lb)    Body mass index is 26.68 kg/m².    Physical Exam  General: pleasant, in NAD  HEENT: normocephalic, atraumatic  CV: heart regular rate and rhythm w/o murmurs  Resp: lungs clear to auscultation anteriorly   Abd: soft, nondistended, nontender  Ext: no peripheral edema  Neuro: grossly nonfocal, speech clear  Psych: appropriate affect    Antibiotics  sodium chloride 0.9% flush 3 mL  colchicine tablet 0.6 mg  ibuprofen tablet 600 mg  metoprolol succinate XL (Toprol-XL) 24 hr tablet 50 mg  pantoprazole (ProtoNix) EC tablet 40 mg  perflutren lipid microspheres (Definity) injection 0.5-10 mL of dilution  sulfur hexafluoride microsphr (Lumason) injection 24.28 mg  perflutren protein A microsphere (Optison) injection 0.5 mL  acetaminophen (Tylenol) tablet 650 mg  melatonin tablet 3 mg  perflutren lipid microspheres (Definity) injection 0.5-10 mL of dilution  acetaminophen (Tylenol) tablet 975 mg      Relevant Results  Labs  Results from last 72 hours   Lab Units 11/19/23  0526 11/18/23  0505 11/17/23  0949   WBC AUTO x10*3/uL 8.1 11.9* 17.4*   HEMOGLOBIN g/dL 11.2* 13.4* 13.0*   HEMATOCRIT % 36.1* 41.2 41.0   PLATELETS AUTO x10*3/uL 429 302 581*   NEUTROS PCT AUTO % 66.0 77.0 81.2   LYMPHS PCT AUTO % 19.2 13.4 9.9   MONOS PCT AUTO % 11.1 7.4 7.5   EOS PCT AUTO % 2.8 1.5 0.5     Results from last 72 hours   Lab Units 11/19/23  0526 11/18/23  0755 11/17/23  0949   SODIUM mmol/L 138 137 134*   POTASSIUM mmol/L 4.3 3.9 4.1   CHLORIDE mmol/L 99 98 95*   CO2 mmol/L 29 29 28   BUN mg/dL  12 12 13   CREATININE mg/dL 0.85 0.80 0.90   GLUCOSE mg/dL 79 95 103*   CALCIUM mg/dL 10.0 9.6 9.7   ANION GAP mmol/L 14 14 15   EGFR mL/min/1.73m*2 >90 >90 >90   PHOSPHORUS mg/dL 4.6 3.6  --      Results from last 72 hours   Lab Units 11/19/23  0526 11/18/23  0755 11/17/23  0949   ALK PHOS U/L  --   --  91   BILIRUBIN TOTAL mg/dL  --   --  0.7   PROTEIN TOTAL g/dL  --   --  7.9   ALT U/L  --   --  29   AST U/L  --   --  13   ALBUMIN g/dL 4.2 4.0 4.6     Estimated Creatinine Clearance: 125 mL/min (by C-G formula based on SCr of 0.85 mg/dL).  C-Reactive Protein   Date Value Ref Range Status   11/17/2023 26.26 (H) <1.00 mg/dL Final   11/07/2023 11.66 (H) <1.00 mg/dL Final   11/04/2023 24.11 (H) <1.00 mg/dL Final     Microbiology  No results found for the last 14 days.    Imaging  Transthoracic Echo (TTE) Limited    Result Date: 11/18/2023   [facility], [address] [place] [address]                Tel [phone] and Fax [phone] TRANSTHORACIC ECHOCARDIOGRAM REPORT  Patient Name:      SHELBY GUPTA JEFF       Reading Physician:    [id number] Jose De Jesus King MD Study Date:        11/18/2023           Ordering Provider:    [id number] KENNY GRECO MRN/PID:           [id number]             Fellow: Accession#:        [id number]         Nurse:                Marisol BANG Date of Birth/Age: 1990 / 33      Sonographer:          Elias ochoa RDCS Gender:            M                    Additional Staff: Height:            195.58 cm            Admit Date:           11/17/2023 Weight:            102.06 kg            Admission Status:     Inpatient -                                                               Routine BSA:               2.35 m2              Encounter#:           [id number]                                          Department Location:  [facility] Non                                                               Invasive Blood Pressure: 123 /78 mmHg Study Type:    TRANSTHORACIC ECHO (TTE) LIMITED Diagnosis/ICD: Other pericardial effusion (noninflammatory)-I31.39 Indication:    Pericardial effusion; Pericardisits associated with atrial septal                defect CPT Code:      Echo Limited-69484; Color Doppler-69746; Doppler Limited-95790 Patient History: Pertinent History: PAF; chest pain; pericarditis. Study Detail: The following Echo studies were performed: 2D, M-Mode, Doppler and               color flow. Technically challenging study due to body habitus.               Agitated saline used as a contrast agent for intraseptal flow               evaluation.  PHYSICIAN INTERPRETATION: Left Ventricle: The left ventricular systolic function is normal, with an estimated ejection fraction of 55%. There are no regional wall motion abnormalities. The left ventricular cavity size is normal. Spectral Doppler shows a normal pattern of left ventricular diastolic filling. Left Atrium: The left atrium is normal in size. Right Ventricle: The right ventricle is normal in size. There is normal right ventricular global systolic function. Right Atrium: The right atrium is normal in size. Aortic Valve: The aortic valve is trileaflet. Aortic valve regurgitation was not assessed. Mitral Valve: The mitral valve is normal in structure. There is trace mitral valve regurgitation. Tricuspid Valve: The tricuspid valve is structurally normal. There is trace tricuspid regurgitation. Right ventricular systolic pressure could not be accurately assessed in this patient. Pulmonic Valve: The pulmonic valve is structurally normal. Pulmonic valve regurgitation was not assessed. Pericardium: There is a small to moderate pericardial effusion lateral to the left ventricle. The pericardial effusion appears to contain fibrinous material.  There is no evidence of constrictive pericarditis. There is thickening of the pericardium lateral to the left ventricle. Pleural: There is left pleural effusion. Aorta: The aortic root is normal. Systemic Veins: The inferior vena cava appears to be of normal size. There is IVC inspiratory collapse greater than 50%. In comparison to the previous echocardiogram(s): Compared with study from 11/7/2023, the degree of pericardial effusion has decreased from moderate to mild to moderate. It continue to apperar organized with fibrinous material. The IVC is no longer dilated and continue to have >50% inspiratory collapse.  CONCLUSIONS:  1. Left ventricular systolic function is normal with a 55% estimated ejection fraction.  2. There is a small to moderate pericardial effusion lateral to the left ventricle.  3. The pericardial effusion appears to contain fibrinous material.  4. There is thickening of the pericardium lateral to the left ventricle.  5. There is no evidence of constrictive pericarditis.  6. Compared with study from 11/7/2023, the degree of pericardial effusion has decreased from moderate to mild to moderate. It continue to apperar organized with fibrinous material. The IVC is no longer dilated and continue to have >50% inspiratory collapse. QUANTITATIVE DATA SUMMARY: 2D MEASUREMENTS:                          Normal Ranges: Ao Root d:     3.60 cm   (2.0-3.7cm) LAs:           4.40 cm   (2.7-4.0cm) IVSd:          0.87 cm   (0.6-1.1cm) LVPWd:         0.76 cm   (0.6-1.1cm) LVIDd:         5.60 cm   (3.9-5.9cm) LVIDs:         4.10 cm LV Mass Index: 71.8 g/m2 LV % FS        26.8 % LA VOLUME:                             Normal Ranges: LA Volume Index: 19.2 ml/m2 RA VOLUME BY A/L METHOD:                       Normal Ranges: RA Area A4C: 19.1 cm2 AORTA MEASUREMENTS:                    Normal Ranges: Asc Ao, d: 3.10 cm (2.1-3.4cm) LV SYSTOLIC FUNCTION BY 2D PLANIMETRY (MOD):                     Normal Ranges: EF-A4C View:  59.1 % (>=55%) EF-A2C View: 54.1 % EF-Biplane:  56.0 %  RIGHT VENTRICLE: RV Basal 3.14 cm RV Mid   2.59 cm RV Major 7.9 cm TAPSE:   25.9 mm RV s'    0.14 m/s  [id number] Jose De Jesus King MD Electronically signed on 11/18/2023 at 8:25:34 PM  ** Final **     XR chest 1 view    Result Date: 11/17/2023  STUDY: Chest Radiograph;  11/17/23 at 10:55 AM INDICATION: Chest pain. COMPARISON: Chest XR and CTA chest 10/31/23. ACCESSION NUMBER(S): [id number] ORDERING CLINICIAN: DEONDRE CORTÉS TECHNIQUE:  Frontal chest was obtained at 1051 hours. FINDINGS: The heart is not enlarged. There is no evidence of acute pulmonary infiltrate. There is a small left pleural effusion. No right effusion is present    SMALL LEFT PLEURAL EFFUSION. ___ Signed by Alexi Jarrett MD    Point of Care Ultrasound    Result Date: 11/17/2023  Jena Phillip MD     11/17/2023 12:07 PM Performed by: Jena Phillip MD Authorized by: Deondre Cortés MD  Cardiac Indications: chest pain Consitutional Indications: fever Procedure: Cardiac Ultrasound Findings:  Views: parasternal long, parasternal short and apical four Effusion: The pericardial space was visualized and was positive for a PERICARDIAL EFFUSION. (trace pericardial effusion, similar in size when compared to 11/7/2023 TTE) Activity: Ventricular contractions were visualized. LV: LV systolic function was NORMAL. RV: RV size was NORMAL. Impression:         Assessment/Plan   Mr. Jonah Britton is a 32 yo M with PMHx of pericarditis and pAF, who presents to  ED with recurrent fevers I/s/o previously known pericarditis and pericardial effusion s/p pericardiocentesis. Pt continuing to experience daily fevers with drenching night sweats. Likely related to known pericarditis. On review of previous labs, c[place]ackie virus A positive, likely trigger for pericarditis. Repeat echo ordered and consulted Cardiology. HDS stable at this time without evidence of tamponade.      Updates 11/19:   - PET  ordered per cardiology rec     #Fevers  #Pericarditis  - Continue home colchicine, ibuprofen, and Tylenol  - repeat echo with decrease in pericardial effusion, no evidence of tamponade   - F/u CMV, HSV, EBV, HHV  - F/up Bcx  - Cardiology consulted, appreciate recs --> pet ct ordered to look for other source of persistent B symptoms      #Hx of pAF  - Continue metoprolol succinate 50mg daily  - Not currently on AC  - Monitor on telemetry     #Left pleural effusion    - likely iso recent procedures  - flu and covid negative  - VBG/ lactate wnl  - CTM VS, patient satting well on RA, no current SOB     F: bolus PRN  E: replete PRN  N: regular  A: PIV    DVT PPx: SCDs, low score  GI PPx: ppi     CODE STATUS: Full Code (confirmed on admission)  SURROGATE DECISION MAKER: Elviacandido Britton (wife) [phone]; Dayna Britton (mom) [phone]      Lance Lentz MD

## 2023-11-19 NOTE — CARE PLAN
The patient's goals for the shift include    Problem: Pain  Goal: My pain/discomfort is manageable  Outcome: Progressing     Problem: Safety  Goal: Patient will be injury free during hospitalization  Outcome: Progressing     Problem: Daily Care  Goal: Daily care needs are met  Outcome: Progressing       The clinical goals for the shift include pt will remain afebrile

## 2023-11-20 ENCOUNTER — APPOINTMENT (OUTPATIENT)
Dept: RADIOLOGY | Facility: HOSPITAL | Age: 33
End: 2023-11-20
Payer: COMMERCIAL

## 2023-11-20 ENCOUNTER — DOCUMENTATION (OUTPATIENT)
Dept: CARDIOLOGY | Facility: CLINIC | Age: 33
End: 2023-11-20
Payer: COMMERCIAL

## 2023-11-20 LAB
AMYLASE FLD-CCNC: 17 U/L
BASOPHILS NFR FLD MANUAL: 0 %
BLASTS NFR FLD MANUAL: 0 %
CLARITY FLD: ABNORMAL
COLOR FLD: ABNORMAL
EOSINOPHIL NFR FLD MANUAL: 13 %
FUNGUS SPEC CULT: NORMAL
FUNGUS SPEC FUNGUS STN: NORMAL
GLUCOSE BLD MANUAL STRIP-MCNC: 96 MG/DL (ref 74–99)
GLUCOSE FLD-MCNC: 104 MG/DL
IMMATURE GRANULOCYTES IN FLUID: 0 %
LDH FLD L TO P-CCNC: 126 U/L
LDH SERPL L TO P-CCNC: 101 U/L (ref 84–246)
LYMPHOCYTES NFR FLD MANUAL: 23 %
MONOS+MACROS NFR FLD MANUAL: 31 %
NEUTROPHILS NFR FLD MANUAL: 21 %
OTHER CELLS NFR FLD MANUAL: 12 %
PLASMA CELLS NFR FLD MANUAL: 0 %
PROT FLD-MCNC: 4.8 G/DL
PROT SERPL-MCNC: 7 G/DL (ref 6.4–8.2)
RBC # FLD AUTO: ABNORMAL /UL
TOTAL CELLS COUNTED FLD: 100
TSH SERPL-ACNC: 1.49 MIU/L (ref 0.44–3.98)
WBC # FLD AUTO: 2560 /UL

## 2023-11-20 PROCEDURE — 87116 MYCOBACTERIA CULTURE: CPT

## 2023-11-20 PROCEDURE — 2500000001 HC RX 250 WO HCPCS SELF ADMINISTERED DRUGS (ALT 637 FOR MEDICARE OP): Performed by: STUDENT IN AN ORGANIZED HEALTH CARE EDUCATION/TRAINING PROGRAM

## 2023-11-20 PROCEDURE — 99418 PROLNG IP/OBS E/M EA 15 MIN: CPT | Performed by: STUDENT IN AN ORGANIZED HEALTH CARE EDUCATION/TRAINING PROGRAM

## 2023-11-20 PROCEDURE — 99223 1ST HOSP IP/OBS HIGH 75: CPT | Performed by: STUDENT IN AN ORGANIZED HEALTH CARE EDUCATION/TRAINING PROGRAM

## 2023-11-20 PROCEDURE — 89051 BODY FLUID CELL COUNT: CPT

## 2023-11-20 PROCEDURE — 33016 PERICARDIOCENTESIS W/IMAGING: CPT

## 2023-11-20 PROCEDURE — 99232 SBSQ HOSP IP/OBS MODERATE 35: CPT

## 2023-11-20 PROCEDURE — 78815 PET IMAGE W/CT SKULL-THIGH: CPT | Mod: PET TUMOR INIT TX STRAT | Performed by: RADIOLOGY

## 2023-11-20 PROCEDURE — 87070 CULTURE OTHR SPECIMN AEROBIC: CPT

## 2023-11-20 PROCEDURE — A9552 F18 FDG: HCPCS | Performed by: INTERNAL MEDICINE

## 2023-11-20 PROCEDURE — 82150 ASSAY OF AMYLASE: CPT

## 2023-11-20 PROCEDURE — 32555 ASPIRATE PLEURA W/ IMAGING: CPT

## 2023-11-20 PROCEDURE — 32555 ASPIRATE PLEURA W/ IMAGING: CPT | Mod: LT,GC | Performed by: NURSE PRACTITIONER

## 2023-11-20 PROCEDURE — 82945 GLUCOSE OTHER FLUID: CPT

## 2023-11-20 PROCEDURE — 83615 LACTATE (LD) (LDH) ENZYME: CPT

## 2023-11-20 PROCEDURE — 3430000001 HC RX 343 DIAGNOSTIC RADIOPHARMACEUTICALS: Performed by: INTERNAL MEDICINE

## 2023-11-20 PROCEDURE — 2500000004 HC RX 250 GENERAL PHARMACY W/ HCPCS (ALT 636 FOR OP/ED): Performed by: STUDENT IN AN ORGANIZED HEALTH CARE EDUCATION/TRAINING PROGRAM

## 2023-11-20 PROCEDURE — 82947 ASSAY GLUCOSE BLOOD QUANT: CPT | Mod: 59

## 2023-11-20 PROCEDURE — 76942 ECHO GUIDE FOR BIOPSY: CPT

## 2023-11-20 PROCEDURE — 84157 ASSAY OF PROTEIN OTHER: CPT

## 2023-11-20 PROCEDURE — G0378 HOSPITAL OBSERVATION PER HR: HCPCS

## 2023-11-20 PROCEDURE — 99233 SBSQ HOSP IP/OBS HIGH 50: CPT | Performed by: STUDENT IN AN ORGANIZED HEALTH CARE EDUCATION/TRAINING PROGRAM

## 2023-11-20 PROCEDURE — 88104 CYTOPATH FL NONGYN SMEARS: CPT

## 2023-11-20 RX ORDER — FLUDEOXYGLUCOSE F 18 200 MCI/ML
12.19 INJECTION, SOLUTION INTRAVENOUS
Status: COMPLETED | OUTPATIENT
Start: 2023-11-20 | End: 2023-11-20

## 2023-11-20 RX ADMIN — IBUPROFEN 600 MG: 600 TABLET ORAL at 15:26

## 2023-11-20 RX ADMIN — FLUDEOXYGLUCOSE F 18 12.19 MILLICURIE: 200 INJECTION, SOLUTION INTRAVENOUS at 10:59

## 2023-11-20 RX ADMIN — PANTOPRAZOLE SODIUM 40 MG: 40 TABLET, DELAYED RELEASE ORAL at 06:40

## 2023-11-20 RX ADMIN — IBUPROFEN 600 MG: 600 TABLET ORAL at 03:47

## 2023-11-20 RX ADMIN — Medication 3 MG: at 20:54

## 2023-11-20 RX ADMIN — COLCHICINE 0.6 MG: 0.6 TABLET ORAL at 20:54

## 2023-11-20 RX ADMIN — COLCHICINE 0.6 MG: 0.6 TABLET ORAL at 08:57

## 2023-11-20 RX ADMIN — METOPROLOL SUCCINATE 50 MG: 50 TABLET, EXTENDED RELEASE ORAL at 08:57

## 2023-11-20 ASSESSMENT — COGNITIVE AND FUNCTIONAL STATUS - GENERAL
DAILY ACTIVITIY SCORE: 24
MOBILITY SCORE: 24

## 2023-11-20 ASSESSMENT — PAIN SCALES - GENERAL
PAINLEVEL_OUTOF10: 0 - NO PAIN
PAINLEVEL_OUTOF10: 0 - NO PAIN

## 2023-11-20 ASSESSMENT — PAIN - FUNCTIONAL ASSESSMENT: PAIN_FUNCTIONAL_ASSESSMENT: 0-10

## 2023-11-20 NOTE — PROGRESS NOTES
Jonah Britton is a 33 y.o. male on day 2 of admission presenting with Fever, unspecified fever cause.    Subjective   Interval History: Patient seen at bedside this morning. Feeling well, no fevers overnight. Did endorse night sweats. No CP, SOB, NVD.       Review of Systems  As above    Objective   Range of Vitals (last 24 hours)  Heart Rate:  [82-95]   Temp:  [36.3 °C (97.3 °F)-36.9 °C (98.4 °F)]   Resp:  [17]   BP: (111-131)/(69-81)   SpO2:  [94 %-100 %]   Daily Weight  11/17/23 : 102 kg (225 lb)    Body mass index is 26.68 kg/m².    Physical Exam  General: pleasant, in NAD  HEENT: normocephalic, atraumatic  CV: heart regular rate and rhythm w/o murmurs  Resp: lungs clear to auscultation anteriorly   Abd: soft, nondistended, nontender  Ext: no peripheral edema  Neuro: grossly nonfocal, speech clear  Psych: appropriate affect    Antibiotics  sodium chloride 0.9% flush 3 mL  colchicine tablet 0.6 mg  ibuprofen tablet 600 mg  metoprolol succinate XL (Toprol-XL) 24 hr tablet 50 mg  pantoprazole (ProtoNix) EC tablet 40 mg  perflutren lipid microspheres (Definity) injection 0.5-10 mL of dilution  sulfur hexafluoride microsphr (Lumason) injection 24.28 mg  perflutren protein A microsphere (Optison) injection 0.5 mL  acetaminophen (Tylenol) tablet 650 mg  melatonin tablet 3 mg  perflutren lipid microspheres (Definity) injection 0.5-10 mL of dilution  acetaminophen (Tylenol) tablet 975 mg      Relevant Results  Labs  Results from last 72 hours   Lab Units 11/19/23  0526 11/18/23  0505 11/17/23  0949   WBC AUTO x10*3/uL 8.1 11.9* 17.4*   HEMOGLOBIN g/dL 11.2* 13.4* 13.0*   HEMATOCRIT % 36.1* 41.2 41.0   PLATELETS AUTO x10*3/uL 429 302 581*   NEUTROS PCT AUTO % 66.0 77.0 81.2   LYMPHS PCT AUTO % 19.2 13.4 9.9   MONOS PCT AUTO % 11.1 7.4 7.5   EOS PCT AUTO % 2.8 1.5 0.5       Results from last 72 hours   Lab Units 11/19/23  0526 11/18/23  0755 11/17/23  0949   SODIUM mmol/L 138 137 134*   POTASSIUM mmol/L 4.3 3.9 4.1    CHLORIDE mmol/L 99 98 95*   CO2 mmol/L 29 29 28   BUN mg/dL 12 12 13   CREATININE mg/dL 0.85 0.80 0.90   GLUCOSE mg/dL 79 95 103*   CALCIUM mg/dL 10.0 9.6 9.7   ANION GAP mmol/L 14 14 15   EGFR mL/min/1.73m*2 >90 >90 >90   PHOSPHORUS mg/dL 4.6 3.6  --        Results from last 72 hours   Lab Units 11/19/23  0526 11/18/23  0755 11/17/23  0949   ALK PHOS U/L  --   --  91   BILIRUBIN TOTAL mg/dL  --   --  0.7   PROTEIN TOTAL g/dL  --   --  7.9   ALT U/L  --   --  29   AST U/L  --   --  13   ALBUMIN g/dL 4.2 4.0 4.6       Estimated Creatinine Clearance: 125 mL/min (by C-G formula based on SCr of 0.85 mg/dL).  C-Reactive Protein   Date Value Ref Range Status   11/17/2023 26.26 (H) <1.00 mg/dL Final   11/07/2023 11.66 (H) <1.00 mg/dL Final   11/04/2023 24.11 (H) <1.00 mg/dL Final     Microbiology  No results found for the last 14 days.    Imaging  Transthoracic Echo (TTE) Limited    Result Date: 11/18/2023   [facility], [address] [place] [address]                Tel [phone] and Fax [phone] TRANSTHORACIC ECHOCARDIOGRAM REPORT  Patient Name:      SHELBY AGUILAR       Reading Physician:    [id number] Jose De Jesus King MD Study Date:        11/18/2023           Ordering Provider:    [id number] KENNY GRECO MRN/PID:           [id number]             Fellow: Accession#:        [id number]         Nurse:                Marisol BANG Date of Birth/Age: 1990 / 33      Sonographer:          Elias ochoa RDCS Gender:            M                    Additional Staff: Height:            195.58 cm            Admit Date:           11/17/2023 Weight:            102.06 kg            Admission Status:     Inpatient -                                                               Routine BSA:               2.35 m2               Encounter#:           [id number]                                         Department Location:  [facility] Non                                                               Invasive Blood Pressure: 123 /78 mmHg Study Type:    TRANSTHORACIC ECHO (TTE) LIMITED Diagnosis/ICD: Other pericardial effusion (noninflammatory)-I31.39 Indication:    Pericardial effusion; Pericardisits associated with atrial septal                defect CPT Code:      Echo Limited-90552; Color Doppler-05727; Doppler Limited-42492 Patient History: Pertinent History: PAF; chest pain; pericarditis. Study Detail: The following Echo studies were performed: 2D, M-Mode, Doppler and               color flow. Technically challenging study due to body habitus.               Agitated saline used as a contrast agent for intraseptal flow               evaluation.  PHYSICIAN INTERPRETATION: Left Ventricle: The left ventricular systolic function is normal, with an estimated ejection fraction of 55%. There are no regional wall motion abnormalities. The left ventricular cavity size is normal. Spectral Doppler shows a normal pattern of left ventricular diastolic filling. Left Atrium: The left atrium is normal in size. Right Ventricle: The right ventricle is normal in size. There is normal right ventricular global systolic function. Right Atrium: The right atrium is normal in size. Aortic Valve: The aortic valve is trileaflet. Aortic valve regurgitation was not assessed. Mitral Valve: The mitral valve is normal in structure. There is trace mitral valve regurgitation. Tricuspid Valve: The tricuspid valve is structurally normal. There is trace tricuspid regurgitation. Right ventricular systolic pressure could not be accurately assessed in this patient. Pulmonic Valve: The pulmonic valve is structurally normal. Pulmonic valve regurgitation was not assessed. Pericardium: There is a small to moderate pericardial effusion lateral to the left ventricle.  The pericardial effusion appears to contain fibrinous material. There is no evidence of constrictive pericarditis. There is thickening of the pericardium lateral to the left ventricle. Pleural: There is left pleural effusion. Aorta: The aortic root is normal. Systemic Veins: The inferior vena cava appears to be of normal size. There is IVC inspiratory collapse greater than 50%. In comparison to the previous echocardiogram(s): Compared with study from 11/7/2023, the degree of pericardial effusion has decreased from moderate to mild to moderate. It continue to apperar organized with fibrinous material. The IVC is no longer dilated and continue to have >50% inspiratory collapse.  CONCLUSIONS:  1. Left ventricular systolic function is normal with a 55% estimated ejection fraction.  2. There is a small to moderate pericardial effusion lateral to the left ventricle.  3. The pericardial effusion appears to contain fibrinous material.  4. There is thickening of the pericardium lateral to the left ventricle.  5. There is no evidence of constrictive pericarditis.  6. Compared with study from 11/7/2023, the degree of pericardial effusion has decreased from moderate to mild to moderate. It continue to apperar organized with fibrinous material. The IVC is no longer dilated and continue to have >50% inspiratory collapse. QUANTITATIVE DATA SUMMARY: 2D MEASUREMENTS:                          Normal Ranges: Ao Root d:     3.60 cm   (2.0-3.7cm) LAs:           4.40 cm   (2.7-4.0cm) IVSd:          0.87 cm   (0.6-1.1cm) LVPWd:         0.76 cm   (0.6-1.1cm) LVIDd:         5.60 cm   (3.9-5.9cm) LVIDs:         4.10 cm LV Mass Index: 71.8 g/m2 LV % FS        26.8 % LA VOLUME:                             Normal Ranges: LA Volume Index: 19.2 ml/m2 RA VOLUME BY A/L METHOD:                       Normal Ranges: RA Area A4C: 19.1 cm2 AORTA MEASUREMENTS:                    Normal Ranges: Asc Ao, d: 3.10 cm (2.1-3.4cm) LV SYSTOLIC FUNCTION BY 2D  PLANIMETRY (MOD):                     Normal Ranges: EF-A4C View: 59.1 % (>=55%) EF-A2C View: 54.1 % EF-Biplane:  56.0 %  RIGHT VENTRICLE: RV Basal 3.14 cm RV Mid   2.59 cm RV Major 7.9 cm TAPSE:   25.9 mm RV s'    0.14 m/s  [id number] Jose De Jesus King MD Electronically signed on 11/18/2023 at 8:25:34 PM  ** Final **     XR chest 1 view    Result Date: 11/17/2023  STUDY: Chest Radiograph;  11/17/23 at 10:55 AM INDICATION: Chest pain. COMPARISON: Chest XR and CTA chest 10/31/23. ACCESSION NUMBER(S): [id number] ORDERING CLINICIAN: DEONDRE CORTÉS TECHNIQUE:  Frontal chest was obtained at 1051 hours. FINDINGS: The heart is not enlarged. There is no evidence of acute pulmonary infiltrate. There is a small left pleural effusion. No right effusion is present    SMALL LEFT PLEURAL EFFUSION. ___ Signed by Alexi Jarrett MD    Point of Care Ultrasound    Result Date: 11/17/2023  Jena Phillip MD     11/17/2023 12:07 PM Performed by: Jena Phillip MD Authorized by: Deondre Cortés MD  Cardiac Indications: chest pain Consitutional Indications: fever Procedure: Cardiac Ultrasound Findings:  Views: parasternal long, parasternal short and apical four Effusion: The pericardial space was visualized and was positive for a PERICARDIAL EFFUSION. (trace pericardial effusion, similar in size when compared to 11/7/2023 TTE) Activity: Ventricular contractions were visualized. LV: LV systolic function was NORMAL. RV: RV size was NORMAL. Impression:         Assessment/Plan   Mr. Jonah Britton is a 32 yo M with PMHx of pericarditis and pAF, who presents to  ED with recurrent fevers I/s/o previously known pericarditis and pericardial effusion s/p pericardiocentesis. Pt continuing to experience daily fevers with drenching night sweats. Likely related to known pericarditis. On review of previous labs, c[place]ackie virus A positive, likely trigger for pericarditis. Repeat echo ordered and consulted Cardiology. HDS stable at this  time without evidence of tamponade. TTE 11/18 with LVEF 55%, small to moderate pericardial effusion appearing to contain fibrinous material, thickening of the pericardium lateral to the left ventricle, no evidence constrictive pericarditis. The IVC is no longer dilated and continue to have >50% inspiratory collapse.     Updates 11/20:   - PET ordered per cardiology rec, pending  - patient may be able to be discharged after PET  - will assess MEFV gene mutation, may need to coordinate with genetics team     #Fevers  #Pericarditis  - Continue home colchicine, ibuprofen, and Tylenol  - repeat echo with decrease in pericardial effusion, no evidence of tamponade   - CMV, HSV, EBV all negative  - f/up HHV  - F/up Bcx  - Cardiology consulted, appreciate recs --> pet ct ordered to look for other source of persistent B symptoms      #Hx of pAF  - Continue metoprolol succinate 50mg daily  - Not currently on AC  - Monitor on telemetry     #Left pleural effusion    - likely iso recent procedures  - flu and covid negative  - VBG/ lactate wnl  - CTM VS, patient satting well on RA, no current SOB     F: bolus PRN  E: replete PRN  N: regular  A: PIV    DVT PPx: SCDs, low score  GI PPx: ppi     CODE STATUS: Full Code (confirmed on admission)  SURROGATE DECISION MAKER: Elvia Britton (wife) [phone]; Dayna Britton (mom) [phone]      Mirtha Chavez MD

## 2023-11-20 NOTE — PROGRESS NOTES
11/20/23 4747 Transitional Care Coordinator Notes:    Patient was scheduled for a PET scan and thoracentesis today. Family is at bedside and were able to answer assessment questions. Family denies any financial or social work needs. Patient is working on  establishing care with a PCP. Family will transport patient home at time of discharge.                    Assessment/Plan   Principal Problem:    Fever, unspecified fever cause    Discharge Plans: discharge to home           Mary Carrera RN

## 2023-11-20 NOTE — PROGRESS NOTES
Subjective   Patient reports feeling well, no new adverse events overnight. Patient denies any chest pain, shortness of breath, palpitations, dizziness or syncope. Patient is hemodynamically stable. He underwent PET scan. Today he was submitted to L pleural drainage with no complications.      Objective   Physical Exam  General: alert, oriented and in no acute distress  HEENT: NC/AT; EOMI; PERRLA, external ear is normal  Neck: supple; trachea midline; no masses; no JVD  Chest: lungs clear to auscultation bilaterally; no wheezing; crackles bilaterally  CVS: regular rhythm, S1S2 normal, no murmurs  Abdomen: Soft, non-tender, non-distension, no organomegaly  Extremities: no clubbing/cyanosis/edema  Neuro: Grossly intact     Psychiatric: Normal mood and affect    Cardiographics      11/19/2023    12:22 PM 11/19/2023     3:43 PM 11/19/2023     9:02 PM 11/20/2023    12:41 AM 11/20/2023     8:11 AM 11/20/2023    12:55 PM 11/20/2023     5:05 PM   Vitals   Systolic 119 131 116 111 116 128 125   Diastolic 73 81 69 69 75 80 75   Heart Rate 89 95 88 87 91 91 89   Temp 36.9 °C (98.4 °F) 36.3 °C (97.3 °F) 36.4 °C (97.5 °F) 36.4 °C (97.5 °F) 36.5 °C (97.7 °F) 36.5 °C (97.7 °F) 36.2 °C (97.2 °F)   Resp  17 17 17 18 16 16         Transthoracic Echo (TTE) Limited    Result Date: 11/18/2023   [facility], [address] [place] [address]                Tel [phone] and Fax [phone] TRANSTHORACIC ECHOCARDIOGRAM REPORT  Patient Name:      SHELBY Curry Physician:    [id number] Jose De Jesus King MD Study Date:        11/18/2023           Ordering Provider:    [id number] GRECO MRN/PID:           [id number]             Fellow: Accession#:        [id number]         Nurse:                Marisol BANG Date of Birth/Age: 1990 / 33      Sonographer:          Elias  Azzardi                    years                                      [facility] Gender:            M                    Additional Staff: Height:            195.58 cm            Admit Date:           11/17/2023 Weight:            102.06 kg            Admission Status:     Inpatient -                                                               Routine BSA:               2.35 m2              Encounter#:           [id number]                                         Department Location:  [facility]                                                               Invasive Blood Pressure: 123 /78 mmHg Study Type:    TRANSTHORACIC ECHO (TTE) LIMITED Diagnosis/ICD: Other pericardial effusion (noninflammatory)-I31.39 Indication:    Pericardial effusion; Pericardisits associated with atrial septal                defect CPT Code:      Echo Limited-36242; Color Doppler-48202; Doppler Limited-82718 Patient History: Pertinent History: PAF; chest pain; pericarditis. Study Detail: The following Echo studies were performed: 2D, M-Mode, Doppler and               color flow. Technically challenging study due to body habitus.               Agitated saline used as a contrast agent for intraseptal flow               evaluation.  PHYSICIAN INTERPRETATION: Left Ventricle: The left ventricular systolic function is normal, with an estimated ejection fraction of 55%. There are no regional wall motion abnormalities. The left ventricular cavity size is normal. Spectral Doppler shows a normal pattern of left ventricular diastolic filling. Left Atrium: The left atrium is normal in size. Right Ventricle: The right ventricle is normal in size. There is normal right ventricular global systolic function. Right Atrium: The right atrium is normal in size. Aortic Valve: The aortic valve is trileaflet. Aortic valve regurgitation was not assessed. Mitral Valve: The mitral valve is normal in structure. There is trace mitral valve regurgitation. Tricuspid  Valve: The tricuspid valve is structurally normal. There is trace tricuspid regurgitation. Right ventricular systolic pressure could not be accurately assessed in this patient. Pulmonic Valve: The pulmonic valve is structurally normal. Pulmonic valve regurgitation was not assessed. Pericardium: There is a small to moderate pericardial effusion lateral to the left ventricle. The pericardial effusion appears to contain fibrinous material. There is no evidence of constrictive pericarditis. There is thickening of the pericardium lateral to the left ventricle. Pleural: There is left pleural effusion. Aorta: The aortic root is normal. Systemic Veins: The inferior vena cava appears to be of normal size. There is IVC inspiratory collapse greater than 50%. In comparison to the previous echocardiogram(s): Compared with study from 11/7/2023, the degree of pericardial effusion has decreased from moderate to mild to moderate. It continue to apperar organized with fibrinous material. The IVC is no longer dilated and continue to have >50% inspiratory collapse.  CONCLUSIONS:  1. Left ventricular systolic function is normal with a 55% estimated ejection fraction.  2. There is a small to moderate pericardial effusion lateral to the left ventricle.  3. The pericardial effusion appears to contain fibrinous material.  4. There is thickening of the pericardium lateral to the left ventricle.  5. There is no evidence of constrictive pericarditis.  6. Compared with study from 11/7/2023, the degree of pericardial effusion has decreased from moderate to mild to moderate. It continue to apperar organized with fibrinous material. The IVC is no longer dilated and continue to have >50% inspiratory collapse. QUANTITATIVE DATA SUMMARY: 2D MEASUREMENTS:                          Normal Ranges: Ao Root d:     3.60 cm   (2.0-3.7cm) LAs:           4.40 cm   (2.7-4.0cm) IVSd:          0.87 cm   (0.6-1.1cm) LVPWd:         0.76 cm   (0.6-1.1cm) LVIDd:          5.60 cm   (3.9-5.9cm) LVIDs:         4.10 cm LV Mass Index: 71.8 g/m2 LV % FS        26.8 % LA VOLUME:                             Normal Ranges: LA Volume Index: 19.2 ml/m2 RA VOLUME BY A/L METHOD:                       Normal Ranges: RA Area A4C: 19.1 cm2 AORTA MEASUREMENTS:                    Normal Ranges: Asc Ao, d: 3.10 cm (2.1-3.4cm) LV SYSTOLIC FUNCTION BY 2D PLANIMETRY (MOD):                     Normal Ranges: EF-A4C View: 59.1 % (>=55%) EF-A2C View: 54.1 % EF-Biplane:  56.0 %  RIGHT VENTRICLE: RV Basal 3.14 cm RV Mid   2.59 cm RV Major 7.9 cm TAPSE:   25.9 mm RV s'    0.14 m/s  [id number] Jose De Jesus King MD Electronically signed on 11/18/2023 at 8:25:34 PM  ** Final **     Transthoracic Echo (TTE) Limited    Result Date: 11/7/2023   [facility], [address] [place] [address]                Tel [phone] and Fax [phone] TRANSTHORACIC ECHOCARDIOGRAM REPORT  Patient Name:      SHELBY AGUILAR       Reading Physician:    [id number] Noa Ellis MD Study Date:        11/7/2023            Ordering Provider:    [id number] ALVARO ARIZA MRN/PID:           [id number]             Fellow: Accession#:        [id number]         Nurse: Date of Birth/Age: 1990 / 33      Sonographer:                    years Gender:            M                    Additional Staff: Height:            195.58 cm            Admit Date: Weight:            106.14 kg            Admission Status:     Inpatient -                                                               Priority discharge BSA:               2.39 m2              Encounter#:           [id number]                                         Department Location:  [place] [address] Blood Pressure: 145 /81 mmHg Study Type:    TRANSTHORACIC ECHO (TTE) LIMITED Diagnosis/ICD:  Other pericardial effusion (noninflammatory)-I31.39 Indication:    evaluate pericardial effusion post drain removal with evaluation                for constriction Patient History: Pertinent History: Pericarditis. Study Detail: The following Echo studies were performed: 2D, Doppler, M-Mode and               color flow.  PHYSICIAN INTERPRETATION: Left Ventricle: The left ventricular systolic function is normal, with an estimated ejection fraction of 55%. There are no regional wall motion abnormalities. The left ventricular cavity size is normal. There appears to be a septal bounce. Left ventricular diastolic filling was not assessed. Left Atrium: The left atrium is normal in size. Right Ventricle: The right ventricle is normal in size. There is normal right ventricular global systolic function. Right Atrium: The right atrium is mildly dilated. Aortic Valve: The aortic valve is trileaflet. Aortic valve regurgitation was not assessed. Mitral Valve: The mitral valve is normal in structure. There is trace to mild mitral valve regurgitation. Tricuspid Valve: The tricuspid valve is structurally normal. There is trace tricuspid regurgitation. Pulmonic Valve: The pulmonic valve was not assessed. Pulmonic valve regurgitation was not assessed. Pericardium: There is a moderately sized pericardial effusion. There is a moderate complex, organized appearing residual pericardial effusion which is largest posterior to and lateral to the LV. There is no significant respiratory variation of the MV inflows ( 11.6%) or TV inflows ( 35%). There is no RA or RV collapse. The IVC is dilated ( 2.4cm) but collapses normally with inspiration ( > 50%). No tissue Doppler was done to assess for anulus reversus. Overall there are no echocardiographic features suggestive of tamponade. There is also no significant hepatic vein flow diastolic reversal on expiration. Pt is s/p pericardiocentesis on 11/2/2023. Not a complete assessment for constriction  given lack of tissue Doppler, however other than a possible septal bounce, no echocardiographic data to suggest constriction. Aorta: The aortic root is normal. Systemic Veins: The inferior vena cava appears dilated. There is IVC inspiratory collapse greater than 50%. In comparison to the previous echocardiogram(s): Compared with the prior exam from 10/13/2023 there are no significant changes. The organized pericardial effusion appears similar in size and extent of organization.  CONCLUSIONS:  1. Left ventricular systolic function is normal with a 55% estimated ejection fraction.  2. There appears to be a septal bounce.  3. There is a moderate pericardial effusion.  4. There is a moderate complex, organized appearing residual pericardial effusion which is largest posterior to and lateral to the LV. There is no significant respiratory variation of the MV inflows ( 11.6%) or TV inflows ( 35%). There is no RA or RV collapse. The IVC is dilated ( 2.4cm) but collapses normally with inspiration ( > 50%). No tissue Doppler was done to assess for anulus reversus. Overall there are no echocardiographic features suggestive of tamponade. There is also no significant hepatic vein flow diastolic reversal on expiration. Pt is s/p pericardiocentesis on 11/2/2023. Not a complete assessment for constriction given lack of tissue Doppler, however other than a possible septal bounce, no echocardiographic data to suggest constriction.  5. Compared with the prior exam from 10/13/2023 there are no significant changes. The organized pericardial effusion appears similar in size and extent of organization. QUANTITATIVE DATA SUMMARY: 2D MEASUREMENTS:                           Normal Ranges: Ao Root d:     3.30 cm    (2.0-3.7cm) LAs:           3.90 cm    (2.7-4.0cm) IVSd:          1.18 cm    (0.6-1.1cm) LVPWd:         1.12 cm    (0.6-1.1cm) LVIDd:         5.40 cm    (3.9-5.9cm) LVIDs:         4.05 cm LV Mass Index: 104.3 g/m2 LV % FS         25.0 % LA VOLUME:                              Normal Ranges: LA Vol A4C:        77.5 ml   (22+/-6mL/m2) LA Vol Index A4C:  32.4ml/m2 LA Area A4C:       24.2 cm2 LA Major Axis A4C: 6.4 cm RA VOLUME BY A/L METHOD:                       Normal Ranges: RA Area A4C: 22.0 cm2 AORTA MEASUREMENTS:                      Normal Ranges: Ao Sinus, d: 3.30 cm (2.1-3.5cm) LV SYSTOLIC FUNCTION BY 2D PLANIMETRY (MOD):                     Normal Ranges: EF-A4C View: 60.5 % (>=55%) EF-A2C View: 53.4 % EF-Biplane:  55.1 % LV DIASTOLIC FUNCTION:                     Normal Ranges: MV Peak E: 0.94 m/s (0.7-1.2 m/s) MV Peak A: 0.47 m/s (0.42-0.7 m/s) E/A Ratio: 2.00     (1.0-2.2) MITRAL VALVE:                 Normal Ranges: MV DT: 169 msec (150-240msec)  RIGHT VENTRICLE: RV Basal 4.05 cm TRICUSPID VALVE/RVSP:                   Normal Ranges: IVC Diam: 2.40 cm  [id number] Noa Ellsi MD Electronically signed on 11/7/2023 at 10:56:48 AM  ** Final **     Transthoracic Echo (TTE) Limited    Result Date: 11/4/2023   [facility] ([facility]), [address] [place] [address]                Tel [phone] and Fax [phone] TRANSTHORACIC ECHOCARDIOGRAM REPORT  Patient Name:      SHELBY Curry Physician:    [id number] Rand MD Study Date:        11/4/2023            Ordering Provider:    [id number] NAOMI MARIA MRN/PID:           [id number]             Fellow: Accession#:        [id number]         Nurse: Date of Birth/Age: 1990 / 33      Sonographer:          Jo Ann Jenkins RDCS                    years Gender:            M                    Additional Staff: Height:            195.58 cm            Admit Date:           11/1/2023 Weight:            106.14 kg            Admission Status:     Inpatient -                                                               Routine BSA:               2.39 m2               Encounter#:           [id number]                                         Department Location:  [facility]                                                               T5 Blood Pressure: 145 /79 mmHg Study Type:    TRANSTHORACIC ECHO (TTE) LIMITED Diagnosis/ICD: Other pericardial effusion (noninflammatory)-I31.39; Atrial                septal defect, unspecified-Q21.10 Indication:    Pericardial effusion; Pericarditis associated with atrial septal                defect CPT Code:      Echo Limited-30178; Color Doppler-01814; Doppler Limited-07266 Patient History: Pertinent History: Residual large pericardial effusion post centesis. Study Detail: The following Echo studies were performed: 2D, M-Mode, Doppler and               color flow. Technically challenging study due to prominent lung               artifact.  PHYSICIAN INTERPRETATION: Left Ventricle: The left ventricular systolic function is normal, with an estimated ejection fraction of 55%. There are no regional wall motion abnormalities. The left ventricular cavity size is normal. Left ventricular diastolic filling was not assessed. Left Atrium: The left atrium is normal in size. Right Ventricle: The right ventricle is normal in size. There is normal right ventricular global systolic function. Right Atrium: The right atrium is normal in size. Aortic Valve: The aortic valve is trileaflet. Aortic valve regurgitation was not assessed. Mitral Valve: The mitral valve is normal in structure. Mitral valve regurgitation was not assessed. Tricuspid Valve: The tricuspid valve is structurally normal. Tricuspid regurgitation was not assessed. Pulmonic Valve: The pulmonic valve was not assessed. Pulmonic valve regurgitation was not assessed. Pericardium: There is a moderately sized pericardial effusion. There is no evidence of cardiac tamponade. Pleural: There is a moderate left pleural effusion. Aorta: The aortic root is normal. Systemic Veins: The  inferior vena cava appears dilated. There is less than 50% IVC collapse with inspiration. In comparison to the previous echocardiogram(s):  CONCLUSIONS:  1. Left ventricular systolic function is normal with a 55% estimated ejection fraction.  2. There is a moderate pericardial effusion.  3. There is a moderate pleural effusion.  4. There is no evidence of cardiac tamponade. QUANTITATIVE DATA SUMMARY: LV SYSTOLIC FUNCTION BY 2D PLANIMETRY (MOD):                     Normal Ranges: EF-A4C View: 47.0 % (>=55%) EF-A2C View: 56.3 % EF-Biplane:  51.7 % TRICUSPID VALVE/RVSP:                   Normal Ranges: IVC Diam: 2.60 cm  [id number] Sunny Rand MD Electronically signed on 11/4/2023 at 3:16:42 PM  ** Final **     Transthoracic Echo (TTE) Limited    Result Date: 11/3/2023   [facility], [address] [place] [address]                Tel [phone] and Fax [phone] TRANSTHORACIC ECHOCARDIOGRAM REPORT  Patient Name:      SHELBY AGUILAR       Reading Physician:    [id number] Daniel Matos MD Study Date:        11/3/2023            Ordering Provider:    [id number] ROSLYN HUSSEIN MRN/PID:           [id number]             Fellow: Accession#:        [id number]         Nurse: Date of Birth/Age: 1990 / 33      Sonographer:          Jo Ann Jenkins RDCS                    years Gender:            M                    Additional Staff: Height:            195.00 cm            Admit Date:           11/1/2023 Weight:            106.00 kg            Admission Status:     Inpatient -                                                               Routine BSA:               2.38 m2              Encounter#:           [id number]                                         Department Location:  [facility]                                                               T5 Blood Pressure: 105 /66 mmHg  Study Type:    TRANSTHORACIC ECHO (TTE) LIMITED Diagnosis/ICD: Other pericardial effusion (noninflammatory)-I31.39 Indication:    Pericardial effusion CPT Code:      Echo Limited-56800; Color Doppler-08376; Doppler Limited-47811 Patient History: Pertinent History: A-Fib. residual large pericardial effusion post                    pericardiocentesis, elevated troponin. Study Detail: The following Echo studies were performed: 2D, M-Mode, Doppler and               color flow. Technically challenging study due to patient lying in               supine position.  PHYSICIAN INTERPRETATION: Left Ventricle: The left ventricular systolic function is low normal, with an estimated ejection fraction of 50-55%. There are no regional wall motion abnormalities. The left ventricular cavity size is normal. Left ventricular diastolic filling was not assessed. Left Atrium: The left atrium is normal in size. Right Ventricle: The right ventricle is normal in size. There is mildly reduced right ventricular systolic function. Right Atrium: The right atrium is normal in size. Aortic Valve: The aortic valve appears structurally normal. Aortic valve regurgitation was not assessed. Mitral Valve: The mitral valve is normal in structure. There is trace mitral valve regurgitation. Tricuspid Valve: The tricuspid valve is structurally normal. Tricuspid regurgitation was not assessed. The right ventricular systolic pressure is unable to be estimated. Pulmonic Valve: The pulmonic valve is not well visualized. Pulmonic valve regurgitation was not assessed. Pericardium: There is a large pericardial effusion. The effusion is circumferential. The pericardial effusion appears to contain focal strands. There is no evidence of cardiac tamponade. Although circumferetial pericardial effusion, maxium diamter is around the LV posterior wall with lots of focal strands and septation specially in the effusion noted posteriorly. Aorta: The aortic root is normal.  Systemic Veins: The inferior vena cava appears dilated. There is less than 50% IVC collapse with inspiration. The hepatic vein shows a systolic blunting flow pattern. In comparison to the previous echocardiogram(s): Compared with study from 11/2/2023, no significant change. Pericardial effusionss appear to be similar.  CONCLUSIONS:  1. Left ventricular systolic function is low normal with a 50-55% estimated ejection fraction.  2. There is mildly reduced right ventricular systolic function.  3. There is a large pericardial effusion.  4. There is no evidence of cardiac tamponade.  5. Although circumferetial pericardial effusion, maxium diamter is around the LV posterior wall with lots of focal strands and septation specially in the effusion noted posteriorly.  6. Compared with study from 11/2/2023, no significant change. Pericardial effusionss appear to be similar. QUANTITATIVE DATA SUMMARY: 2D MEASUREMENTS:                          Normal Ranges: IVSd:          1.10 cm   (0.6-1.1cm) LVPWd:         0.70 cm   (0.6-1.1cm) LVIDd:         5.70 cm   (3.9-5.9cm) LVIDs:         3.90 cm LV Mass Index: 82.8 g/m2 LV % FS        31.6 %  RIGHT VENTRICLE: RV Basal 4.50 cm TRICUSPID VALVE/RVSP:                   Normal Ranges: IVC Diam: 2.20 cm  [id number] Daniel Matos MD Electronically signed on 11/3/2023 at 5:16:31 PM  ** Final **     Transthoracic Echo (TTE) Limited    Result Date: 11/2/2023   [facility], [address] [place] [address]                Tel [phone] and Fax [phone] TRANSTHORACIC ECHOCARDIOGRAM REPORT  Patient Name:      SHELBY AGUILAR       Reading Physician:    [id number] Cirilo Jain MD Study Date:        11/2/2023            Ordering Provider:    [id number] NAOMI MARIA MRN/PID:           [id number]             Fellow: Accession#:        [id number]         Nurse: Date  of Birth/Age: 1990 / 33      Sonographer:          Bev Kirby RDCS                    years Gender:            M                    Additional Staff: Height:            195.58 cm            Admit Date: Weight:            106.00 kg            Admission Status:     Inpatient -                                                               Routine BSA:               2.39 m2              Encounter#:           [id number]                                         Department Location:  [facility]                                                               Cath Lab Blood Pressure: 152 /61 mmHg Study Type:    TRANSTHORACIC ECHO (TTE) LIMITED Diagnosis/ICD: Other pericardial effusion (noninflammatory)-I31.39 Indication:    pericardiocentesis CPT Code:      Echo Limited-88414 Patient History: Pertinent History: Pericarditis. Study Detail: The following Echo studies were performed: 2D, M-Mode, Doppler and               color flow.  PHYSICIAN INTERPRETATION: Left Ventricle: The left ventricular systolic function is normal, with an estimated ejection fraction of 60-65%. There are no regional wall motion abnormalities. The left ventricular cavity size is normal. Left ventricular diastolic filling was not assessed. Left Atrium: The left atrium is normal in size. A bubble study using agitated saline was performed. Bubble study is negative. Right Ventricle: The right ventricle is normal in size. There is normal right ventricular global systolic function. Right Atrium: The right atrium is normal in size. Aortic Valve: The aortic valve is trileaflet. Aortic valve regurgitation was not assessed. Mitral Valve: The mitral valve is normal in structure. Mitral valve regurgitation was not assessed. Tricuspid Valve: The tricuspid valve is structurally normal. Tricuspid regurgitation was not assessed. The right ventricular systolic pressure is unable to be estimated. Pulmonic Valve: The pulmonic valve was not assessed. Pulmonic  valve regurgitation was not assessed. Pericardium: There is a large pericardial effusion. The pericardial effusion appears to contain fibrinous material and appears to contain focal strands. After removal of 350 cc, ther is a residual large pericardial loculation adjacent to the lateral LV wall containing many bridging fibrous strands. Pleural: There is left pleural effusion. Aorta: The aortic root was not well visualized. In comparison to the previous echocardiogram(s): Although previous studies are available for review, their comparison with the current echocardiogram is clinically irrelevant.  CONCLUSIONS:  1. Left ventricular systolic function is normal with a 60-65% estimated ejection fraction.  2. There is a large pericardial effusion.  3. After removal of 350 cc, ther is a residual large pericardial loculation adjacent to the lateral LV wall containing many bridging fibrous strands. QUANTITATIVE DATA SUMMARY:  [id number] Cirilo Jain MD Electronically signed on 11/2/2023 at 5:55:43 PM  ** Final **     Transthoracic echo (TTE) limited    Result Date: 11/1/2023              [place] [id number]      Phone [phone] Fax [phone] TRANSTHORACIC ECHOCARDIOGRAM REPORT  Patient Name:      SHELBY AGUILAR       Reading Physician:    [id number] Neal Grant MD Study Date:        10/31/2023           Ordering Provider:    [id number] JAQUELINE SHEPPARD MRN/PID:           [id number]             Fellow: Accession#:        [id number]         Nurse: Date of Birth/Age: 1990 / 33      Sonographer:          Rossana Miller RDCS                    years Gender:            M                    Additional Staff: Height:            195.58 cm            Admit Date: Weight:            106.14 kg            Admission Status:     Outpatient BSA:               2.39 m2               Department Location:  [facility] Echo                                                               Lab Blood Pressure: 131 /72 mmHg Study Type:    TRANSTHORACIC ECHO (TTE) LIMITED Diagnosis/ICD: Other pericardial effusion (noninflammatory)-I31.39 Indication:    Pericardial Effusion CPT Codes:     Echo Limited-16774; Doppler Limited-85536; Color Doppler-48571 Patient History: Pertinent History: Pericardial eff and A-Fib. Study Detail: The following Echo studies were performed: 2D, M-Mode, Doppler and               color flow.  PHYSICIAN INTERPRETATION: Left Ventricle: Left ventricular systolic function is low normal, with an estimated ejection fraction of 50%. There are no regional wall motion abnormalities. The left ventricular cavity size is normal. Left ventricular diastolic filling was not assessed. Left Atrium: The left atrium was not assessed. Right Ventricle: The right ventricle was not assessed. Right ventricular systolic function not assessed. Right Atrium: The right atrium was not assessed. Aortic Valve: The aortic valve was not assessed. Aortic valve regurgitation was not assessed. Mitral Valve: The mitral valve was not assessed. Mitral valve regurgitation was not assessed. Tricuspid Valve: The tricuspid valve was not assessed. Tricuspid regurgitation was not assessed. Pulmonic Valve: The pulmonic valve was not assessed. The pulmonic valve regurgitation was not assessed. Pericardium: There is a moderate to large pericardial effusion. The effusion is circumferential. The pericardial effusion appears to contain focal strands and appears to contain fibrinous material. There is no evidence of cardiac tamponade. There is inferior vena caval plethora and >25% respiratory variation across TV inflow. Dilated IVC without collapse. Aorta: The aortic root was not assessed. Systemic Veins: The inferior vena cava appears dilated. In comparison to the previous echocardiogram(s): The pericardial effusion is  unchanged as compared to prior study on 10/25/2023.  CONCLUSIONS:  1. Left ventricular systolic function is low normal with a 50% estimated ejection fraction.  2. Moderate to large pericardial effusion.  3. There is no evidence of cardiac tamponade. QUANTITATIVE DATA SUMMARY: 2D MEASUREMENTS:                          Normal Ranges: IVSd:          1.27 cm   (0.6-1.1cm) LVPWd:         1.11 cm   (0.6-1.1cm) LVIDd:         4.89 cm   (3.9-5.9cm) LV Mass Index: 92.9 g/m2 LV SYSTOLIC FUNCTION BY 2D PLANIMETRY (MOD):                     Normal Ranges: EF-A4C View: 42.2 % (>=55%) EF-A2C View: 41.8 % EF-Biplane:  43.8 % TRICUSPID VALVE/RVSP:                   Normal Ranges: IVC Diam: 2.45 cm  [id number] Neal Grant MD Electronically signed on 11/1/2023 at 12:10:41 PM  ** Final **     Transthoracic Echo (TTE) Complete    Result Date: 10/25/2023              [place] [address]      Phone [phone] Fax [phone] TRANSTHORACIC ECHOCARDIOGRAM REPORT  Patient Name:      SHELBY AGUILAR       Reading Physician:    [id number] Neal Grant MD Study Date:        10/25/2023           Ordering Provider:    [id number] ERIC BARRON MRN/PID:           [id number]             Fellow: Accession#:        [id number]         Nurse: Date of Birth/Age: 1990 / 33      Sonographer:          Louise Ramirez RDCS                    years Gender:            M                    Additional Staff: Height:            195.58 cm            Admit Date:           10/24/2023 Weight:            106.60 kg            Admission Status:     Inpatient -                                                               Routine BSA:               2.40 m2              Department Location:  [facility] Echo                                                               Lab Blood Pressure:  121 /81 mmHg Study Type:    TRANSTHORACIC ECHO (TTE) COMPLETE Diagnosis/ICD: Unspecified atrial fibrillation-I48.91 Indication:    Atrial Fibrillation CPT Codes:     Echo Complete w Full [id number] Patient History: No pertinent past medical history. Study Detail: The following Echo studies were performed: 2D, M-Mode, Doppler and               color flow.  PHYSICIAN INTERPRETATION: Left Ventricle: Left ventricular systolic function is low normal, with an estimated ejection fraction of 50-55%. There are no regional wall motion abnormalities. The left ventricular cavity size is normal. Left ventricular diastolic filling was indeterminate. Left Atrium: The left atrium is normal in size. Right Ventricle: The right ventricle is normal in size. There is reduced right ventricular systolic function. Right Atrium: The right atrium is normal in size. Aortic Valve: The aortic valve is trileaflet. There is no evidence of aortic valve regurgitation. The peak instantaneous gradient of the aortic valve is 6.7 mmHg. The mean gradient of the aortic valve is 3.0 mmHg. Mitral Valve: The mitral valve is normal in structure. There is no evidence of mitral valve regurgitation. Tricuspid Valve: The tricuspid valve is structurally normal. There is trace tricuspid regurgitation. Pulmonic Valve: The pulmonic valve is structurally normal. There is no indication of pulmonic valve regurgitation. Pericardium: There is a moderate to large pericardial effusion. There is no evidence of cardiac tamponade. Aorta: The aortic root is normal.  CONCLUSIONS:  1. Left ventricular systolic function is low normal with a 50-55% estimated ejection fraction.  2. There is reduced right ventricular systolic function.  3. Moderate to large pericardial effusion.  4. There is no evidence of cardiac tamponade. QUANTITATIVE DATA SUMMARY: 2D MEASUREMENTS:                          Normal Ranges: Ao Root d:     3.20 cm   (2.0-3.7cm) LAs:           4.20 cm   (2.7-4.0cm)  IVSd:          1.00 cm   (0.6-1.1cm) LVPWd:         1.00 cm   (0.6-1.1cm) LVIDd:         4.40 cm   (3.9-5.9cm) LVIDs:         3.20 cm LV Mass Index: 61.7 g/m2 LV % FS        27.3 % LA VOLUME:                               Normal Ranges: LA Vol A4C:        45.0 ml    (22+/-6mL/m2) LA Vol A2C:        45.4 ml LA Vol BP:         50.6 ml LA Vol Index A4C:  18.8ml/m2 LA Vol Index A2C:  18.9 ml/m2 LA Vol Index BP:   21.1 ml/m2 LA Area A4C:       18.4 cm2 LA Area A2C:       16.5 cm2 LA Major Axis A4C: 6.4 cm LA Major Axis A2C: 5.1 cm LA Volume Index:   21.1 ml/m2 RA VOLUME BY A/L METHOD:                       Normal Ranges: RA Area A4C: 16.4 cm2 AORTA MEASUREMENTS:                      Normal Ranges: Ao Sinus, d: 3.20 cm (2.1-3.5cm) Ao STJ, d:   2.70 cm (1.7-3.4cm) Asc Ao, d:   2.80 cm (2.1-3.4cm) LV SYSTOLIC FUNCTION BY 2D PLANIMETRY (MOD):                     Normal Ranges: EF-A4C View: 49.1 % (>=55%) EF-A2C View: 47.8 % EF-Biplane:  46.3 % LV DIASTOLIC FUNCTION:                        Normal Ranges: MV Peak E:    0.83 m/s (0.7-1.2 m/s) MV e'         0.11 m/s (>8.0) MV lateral e' 0.10 m/s MV medial e'  0.12 m/s E/e' Ratio:   7.58     (<8.0) AORTIC VALVE:                                   Normal Ranges: AoV Vmax:                1.29 m/s (<=1.7m/s) AoV Peak P.7 mmHg (<20mmHg) AoV Mean PG:             3.0 mmHg (1.7-11.5mmHg) LVOT Max Red:            0.83 m/s (<=1.1m/s) AoV VTI:                 20.00 cm (18-25cm) LVOT VTI:                16.30 cm LVOT Diameter:           2.40 cm  (1.8-2.4cm) AoV Area, VTI:           3.69 cm2 (2.5-5.5cm2) AoV Area,Vmax:           2.92 cm2 (2.5-4.5cm2) AoV Dimensionless Index: 0.82  RIGHT VENTRICLE: RV Basal 3.20 cm RV Mid   2.70 cm RV Major 8.2 cm TAPSE:   16.5 mm RV s'    0.15 m/s TRICUSPID VALVE/RVSP:                   Normal Ranges: IVC Diam: 1.80 cm  [id number] Neal Grant MD Electronically signed on 10/25/2023 at 9:05:44 AM  ** Final **        Imaging    PET Scan  (11/19/2023):  1. Nonspecific mildly hypermetabolic nonenlarged right  supraclavicular and subcarinal lymph nodes which may be reactive.  2. Hypermetabolic small to moderate pericardial effusion which is  decreased in size from prior 10/31/2023 CTA. A persistent  inflammatory/infectious process cannot be excluded.  3. Increased moderate left pleural effusion with associated  atelectasis without abnormal metabolic activity. Previously seen  small right pleural effusion and associated atelectasis/opacities  have improved.  4. Borderline splenomegaly without associated hypermetabolic activity  to suggest lymphomatous involvement.      Lab Review   Lab Results   Component Value Date     11/19/2023     11/18/2023     (L) 11/17/2023    K 4.3 11/19/2023    K 3.9 11/18/2023    K 4.1 11/17/2023    CO2 29 11/19/2023    CO2 29 11/18/2023    CO2 28 11/17/2023    BUN 12 11/19/2023    BUN 12 11/18/2023    BUN 13 11/17/2023    CREATININE 0.85 11/19/2023    CREATININE 0.80 11/18/2023    CREATININE 0.90 11/17/2023    GLUCOSE 79 11/19/2023    GLUCOSE 95 11/18/2023    GLUCOSE 103 (H) 11/17/2023    CALCIUM 10.0 11/19/2023    CALCIUM 9.6 11/18/2023    CALCIUM 9.7 11/17/2023     ECG: Sinus tachycardia     Echocardiogram: 11/7/2023  CONCLUSIONS:   1. Left ventricular systolic function is normal with a 55% estimated ejection fraction.   2. There appears to be a septal bounce.   3. There is a moderate pericardial effusion.   4. There is a moderate complex, organized appearing residual pericardial effusion which is largest posterior to and lateral to the LV. There is no significant respiratory variation of the MV inflows ( 11.6%) or TV inflows ( 35%). There is no RA or RV collapse. The IVC is dilated ( 2.4cm) but collapses normally with inspiration ( > 50%). No tissue Doppler was done to assess for anulus reversus. Overall there are no echocardiographic features suggestive of tamponade. There is also no significant hepatic  vein flow diastolic reversal on expiration. Pt is s/p pericardiocentesis on 11/2/2023. Not a complete assessment for constriction given lack of tissue Doppler, however other than a possible septal bounce, no echocardiographic data to suggest constriction.   5. Compared with the prior exam from 10/13/2023 there are no significant changes. The organized pericardial effusion appears similar in size and extent of organization.     Cardiac MRI 11/5/2023  IMPRESSION:  1. Mildly dilated LV with normal systolic function (LVEF 55%).  2. Moderate-large pericardial effusion (max 2.1 cm posterolaterally).  3. Thickened pericardium with increased STIR signal and LGE  consistent with pericarditis. There is evidence of respirophasic  septal shift suggestive of constrictive physiology.  4. No evidence of myocardial inflammation/edema on T2 weighted  imaging.  5. No evidence of myocardial infarction or infiltration on LGE  imaging.  6. Mildly dilated RV with preserved systolic function.  7. Small bilateral pleural effusions.      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         Assessment/Plan   In summary, Mr. Jonah Britton is a 33 y.o. male who PMH of acute pericarditis with pericardial effusion s/p  pericardiocentesis and pAfib who presented with recurrent fevers and admitted for fever of unknown origin.     Cardiology consulted for persistent pericardial effusion.     Of note, patient  was recently admitted in Oct and Nov, found to have pericarditis with pericardial effusion in the setting of viral syndrome with no evidence of cardiac tamponade.  Was also noted to be in atrial fibrillation with RVR, spontaneously converted to NSR.  Underwent multiple work-up including inflammatory markers, TB, hepatitis panel, viral panel, autoimmune work-up with no significant abnormalities only positive for coxsackie virus.  CT PE obtained was negative for PE.  He underwent pericardiocentesis on 11/2 with  drainage of 350 cc of fluid with drain in place for a few days.  Cytology negative for malignancy.  Cardiac MRI from 11/5 showed normal systolic function with EF 55%, moderate pericardial effusion, evidence of pericarditis and findings suggestive of constrictive physiology.  He was discharged home on colchicine 0.6 mg twice daily and ibuprofen 600 mg twice daily.     Patient now returning due to persistent fevers for the past several days up to 102F associated with night sweats and some recent weight loss.  He denies any chest pain, shortness of breath, palpitations, orthopnea, PND, presyncope or syncope.    Assessment    #Fever of unknown origin  #Recent acute pericarditis with pericardial effusion requiring drainage  Currently no clinical signs of pericarditis at this time (no chest pain typical for pericarditis, no friction rub upon auscultation). EKG showed some ST segment depression in V4 and V5 with diffuse T wave inversion which are non specific. There are no ST elevations or AR segment depressions seen. Trop negative. Although patient has persistent pericardial effusion, it is decreased from prior imaging.   - S/P successful L pleural drainage (11/20/2023)  - PET Scan findings consistent with pericarditis; pericardial effusion has been improving.  - Would suggest to keep colchicine and ibuprofen  - Would suggest to continue antipyretics for fevers per primary team  - Please reach out to Cardiology team if need for any additional questions or clarifications.     #pAF  CHASVASC 0. No indication for AC.   Currently sinus tachy in the setting of fever.  -Cont home metop     Phi Omalley MD  Cardiology

## 2023-11-20 NOTE — CARE PLAN
The patient's goals for the shift include    Problem: Pain  Goal: My pain/discomfort is manageable  Outcome: Progressing     Problem: Daily Care  Goal: Daily care needs are met  Outcome: Progressing       The clinical goals for the shift include Pt will remain hemodynamically stable during this shift.

## 2023-11-20 NOTE — CARE PLAN
Problem: Safety  Goal: Patient will be injury free during hospitalization  Outcome: Progressing     Problem: Safety  Goal: I will remain free of falls  Outcome: Progressing     Problem: Daily Care  Goal: Daily care needs are met  Outcome: Progressing     Problem: Psychosocial Needs  Goal: Demonstrates ability to cope with hospitalization/illness  Outcome: Progressing     Problem: Psychosocial Needs  Goal: Collaborate with me, my family, and caregiver to identify my specific goals  Outcome: Progressing   The patient's goals for the shift include      The clinical goals for the shift include Pt will remain hemodynamically stable during this shift.

## 2023-11-20 NOTE — POST-PROCEDURE NOTE
Interventional Radiology Brief Postprocedure Note    A time out was performed and Left Hemithorax was examined with US and appropriate entry point was confirmed and marked.  The patient was prepped and draped in a sterile manner, 1% lidocaine was used to anesthesize the skin and subcutaneous tissue. A 5F Centesis needle was then introduced through the skin into the pleural space, the centesis catheter was then threaded without difficulty. 300 ml of serosanguineous fluid was removed without difficulty. The catheter was then removed. No immediate complications were noted during and immediately following the procedure.

## 2023-11-21 VITALS
BODY MASS INDEX: 26.57 KG/M2 | HEIGHT: 77 IN | SYSTOLIC BLOOD PRESSURE: 109 MMHG | OXYGEN SATURATION: 99 % | TEMPERATURE: 96.4 F | RESPIRATION RATE: 18 BRPM | WEIGHT: 225 LBS | HEART RATE: 87 BPM | DIASTOLIC BLOOD PRESSURE: 70 MMHG

## 2023-11-21 LAB
ALBUMIN SERPL BCP-MCNC: 4 G/DL (ref 3.4–5)
ANION GAP SERPL CALC-SCNC: 16 MMOL/L (ref 10–20)
BACTERIA BLD CULT: NORMAL
BACTERIA BLD CULT: NORMAL
BASOPHILS # BLD AUTO: 0.06 X10*3/UL (ref 0–0.1)
BASOPHILS NFR BLD AUTO: 0.7 %
BUN SERPL-MCNC: 12 MG/DL (ref 6–23)
CALCIUM SERPL-MCNC: 9.6 MG/DL (ref 8.6–10.6)
CHLORIDE SERPL-SCNC: 100 MMOL/L (ref 98–107)
CO2 SERPL-SCNC: 25 MMOL/L (ref 21–32)
CREAT SERPL-MCNC: 0.74 MG/DL (ref 0.5–1.3)
EOSINOPHIL # BLD AUTO: 0.27 X10*3/UL (ref 0–0.7)
EOSINOPHIL NFR BLD AUTO: 3.3 %
ERYTHROCYTE [DISTWIDTH] IN BLOOD BY AUTOMATED COUNT: 13.1 % (ref 11.5–14.5)
GFR SERPL CREATININE-BSD FRML MDRD: >90 ML/MIN/1.73M*2
GLUCOSE SERPL-MCNC: 91 MG/DL (ref 74–99)
HCT VFR BLD AUTO: 36.5 % (ref 41–52)
HGB BLD-MCNC: 11.5 G/DL (ref 13.5–17.5)
HUMAN HERPESVIRUS-6 PCR PLASMA: NOT DETECTED COPIES/ML
HUMAN HERPESVIRUS-8 PCR WHOLE BLOOD: NOT DETECTED COPIES/ML
IMM GRANULOCYTES # BLD AUTO: 0.04 X10*3/UL (ref 0–0.7)
IMM GRANULOCYTES NFR BLD AUTO: 0.5 % (ref 0–0.9)
LDH SERPL L TO P-CCNC: 98 U/L (ref 84–246)
LYMPHOCYTES # BLD AUTO: 1.45 X10*3/UL (ref 1.2–4.8)
LYMPHOCYTES NFR BLD AUTO: 18 %
MAGNESIUM SERPL-MCNC: 2.08 MG/DL (ref 1.6–2.4)
MCH RBC QN AUTO: 26.9 PG (ref 26–34)
MCHC RBC AUTO-ENTMCNC: 31.5 G/DL (ref 32–36)
MCV RBC AUTO: 86 FL (ref 80–100)
MONOCYTES # BLD AUTO: 0.7 X10*3/UL (ref 0.1–1)
MONOCYTES NFR BLD AUTO: 8.7 %
NEUTROPHILS # BLD AUTO: 5.55 X10*3/UL (ref 1.2–7.7)
NEUTROPHILS NFR BLD AUTO: 68.8 %
NRBC BLD-RTO: 0 /100 WBCS (ref 0–0)
PATH REVIEW-CELL CT,FLUID: NORMAL
PHOSPHATE SERPL-MCNC: 4.2 MG/DL (ref 2.5–4.9)
PLATELET # BLD AUTO: 409 X10*3/UL (ref 150–450)
POTASSIUM SERPL-SCNC: 4.3 MMOL/L (ref 3.5–5.3)
RBC # BLD AUTO: 4.27 X10*6/UL (ref 4.5–5.9)
SODIUM SERPL-SCNC: 137 MMOL/L (ref 136–145)
WBC # BLD AUTO: 8.1 X10*3/UL (ref 4.4–11.3)

## 2023-11-21 PROCEDURE — 36415 COLL VENOUS BLD VENIPUNCTURE: CPT

## 2023-11-21 PROCEDURE — 2500000004 HC RX 250 GENERAL PHARMACY W/ HCPCS (ALT 636 FOR OP/ED): Performed by: STUDENT IN AN ORGANIZED HEALTH CARE EDUCATION/TRAINING PROGRAM

## 2023-11-21 PROCEDURE — 85025 COMPLETE CBC W/AUTO DIFF WBC: CPT

## 2023-11-21 PROCEDURE — 83615 LACTATE (LD) (LDH) ENZYME: CPT

## 2023-11-21 PROCEDURE — G0378 HOSPITAL OBSERVATION PER HR: HCPCS

## 2023-11-21 PROCEDURE — 80069 RENAL FUNCTION PANEL: CPT

## 2023-11-21 PROCEDURE — 2500000001 HC RX 250 WO HCPCS SELF ADMINISTERED DRUGS (ALT 637 FOR MEDICARE OP): Performed by: STUDENT IN AN ORGANIZED HEALTH CARE EDUCATION/TRAINING PROGRAM

## 2023-11-21 PROCEDURE — 99239 HOSP IP/OBS DSCHRG MGMT >30: CPT

## 2023-11-21 PROCEDURE — 83735 ASSAY OF MAGNESIUM: CPT

## 2023-11-21 RX ADMIN — IBUPROFEN 600 MG: 600 TABLET ORAL at 04:41

## 2023-11-21 RX ADMIN — PANTOPRAZOLE SODIUM 40 MG: 40 TABLET, DELAYED RELEASE ORAL at 06:18

## 2023-11-21 RX ADMIN — METOPROLOL SUCCINATE 50 MG: 50 TABLET, EXTENDED RELEASE ORAL at 09:02

## 2023-11-21 RX ADMIN — COLCHICINE 0.6 MG: 0.6 TABLET ORAL at 09:02

## 2023-11-21 ASSESSMENT — PAIN SCALES - GENERAL
PAINLEVEL_OUTOF10: 2
PAINLEVEL_OUTOF10: 0 - NO PAIN

## 2023-11-21 ASSESSMENT — COGNITIVE AND FUNCTIONAL STATUS - GENERAL
DAILY ACTIVITIY SCORE: 24
MOBILITY SCORE: 24

## 2023-11-21 ASSESSMENT — PAIN - FUNCTIONAL ASSESSMENT
PAIN_FUNCTIONAL_ASSESSMENT: 0-10
PAIN_FUNCTIONAL_ASSESSMENT: 0-10

## 2023-11-21 NOTE — CARE PLAN
Problem: Pain  Goal: My pain/discomfort is manageable  Outcome: Progressing     Problem: Safety  Goal: Patient will be injury free during hospitalization  Outcome: Progressing  Goal: I will remain free of falls  Outcome: Progressing     Problem: Daily Care  Goal: Daily care needs are met  Outcome: Progressing     Problem: Psychosocial Needs  Goal: Demonstrates ability to cope with hospitalization/illness  Outcome: Progressing  Goal: Collaborate with me, my family, and caregiver to identify my specific goals  Outcome: Progressing     Problem: Discharge Barriers  Goal: My discharge needs are met  Outcome: Progressing      The patient's goals for the shift include  patient will be safe and stable throughout the shift.    The clinical goals for the shift include patient will be afebrile throughout the shift    Over the shift, the patient did make progress towards the following goals. Patient was safe and stable throughout the shift.Patient was afebrile throughout the shift

## 2023-11-21 NOTE — DISCHARGE SUMMARY
Discharge Diagnosis  Fever, unspecified fever cause    Issues Requiring Follow-Up  - PCP and Cardiology Follow up    Test Results Pending At Discharge  Pending Labs       Order Current Status    AFB Culture/Smear In process    Tressa-Barr Virus Antibody to Viral Capsid Antigen, IgM; [facility]; [id number] - Miscellaneous Test In process    HHV-6 DNA probe, quantitative In process    HHV-8 PCR Quantitative Whole Blood In process    Pathologist Review-Cell Count,Fluid In process    Blood Culture Preliminary result    Blood Culture Preliminary result    Sterile Fluid Culture/Smear Preliminary result            Hospital Course  Jonah Britton is a 32 yo M with PMHx of pericarditis and pAF, who presents to  ED with recurrent fevers I/s/o previously known pericarditis and pericardial effusion s/p pericardiocentesis. Pt continuing to experience daily fevers with drenching night sweats on admission. Likely related to known pericarditis. On review of previous labs, cocksackie virus A positive, likely trigger for pericarditis. On admission, repeat echo ordered and consulted Cardiology. Admission CXR significant for left sided pleural effusion likely iso recent pericardiocentesis. Patient home meds including colchicine, ibuprofen, metoprolol succinate 50mg daily and Tylenol started.  TTE 11/18 with LVEF 55%, small to moderate pericardial effusion appearing to contain fibrinous material, thickening of the pericardium lateral to the left ventricle, no evidence constrictive pericarditis. The IVC is no longer dilated and continue to have >50% inspiratory collapse. CMV, HSV, EBV returned negative. HHV6 and 8 pending results.   PET scan performed 11/20 to evaluate for potential malignant processes due to patient presentation with B symptoms, which showed Nonspecific mildly hypermetabolic nonenlarged right supraclavicular and subcarinal lymph nodes which may be reactive. Hypermetabolic small to moderate pericardial effusion which is  decreased in size from prior 10/31/2023 CTA. Increased moderate left pleural effusion with associated atelectasis. Previously seen small right pleural effusion and associated atelectasis/opacities have improved. Borderline splenomegaly without associated hypermetabolic activity to suggest lymphomatous involvement.   Genetics consulted 11/20 to evaluate for MEFV mutation per cardiology recommendations. Patient deferred any genetic evaluation at this time. Thoracentesis performed 11/20 for further evaluation of left sided pleural effusion with results that showed 4+ PMNs on gram stain, 12% unclassified cells, 2560 WBC, and 94552 RBC. Per Light's Criteria, patient with exudative effusion (98% sensitive per calculator). Exudative effusion thought to be infectious/viral iso known coxsackie virus infection. Patient discharged in stable condition and arranged for close outpatient follow up with cardiology and PCP.    Pertinent Physical Exam At Time of Discharge  Physical Exam  General: pleasant, in NAD  HEENT: normocephalic, atraumatic  CV: heart regular rate and rhythm w/o murmurs  Resp: lungs clear to auscultation bilaterally  Abd: soft, nondistended, nontender  Ext: no peripheral edema  Neuro: grossly nonfocal, speech clear  Psych: appropriate affect    Home Medications     Medication List      CONTINUE taking these medications     colchicine 0.6 mg tablet; Take 1 tablet (0.6 mg) by mouth 2 times a day.   ibuprofen 600 mg tablet; Take 1 tablet (600 mg) by mouth every 12 hours.   metoprolol succinate XL 50 mg 24 hr tablet; Commonly known as:   Toprol-XL; Take 1 tablet (50 mg) by mouth once daily. Do not crush or   chew. Do not start before November 8, 2023.   pantoprazole 40 mg EC tablet; Commonly known as: ProtoNix; Take 1 tablet   (40 mg) by mouth once daily in the morning. Take before meals. Do not   crush, chew, or split. Do not start before November 8, 2023.       Outpatient Follow-Up  Future Appointments   Date  Time Provider Department Center   11/29/2023 11:00 AM [facility] 2 [id number] [facility]   12/19/2023  1:40 PM Israel Saenz MD [id number] [facility]       Mirtha Chavez MD

## 2023-11-21 NOTE — CONSULTS
Basic info:  Jonah Britton (Legal Name)     33 y.o., 1990   Legal sex: Male   Marital status:    Race: White   Ethnicity: Decline to Answer   Languages   English   [address] [address] [place] [id number]   Employer: EMPLOYED   MRN: [id number]  Bed: 6014-A    Requesting physician/service:  Reason for the visit / consultation:      Referred by: Harman Sinha MD  For: Potential MEFV mutation (?)    History of Present Illness:    Jonah Britton is a 33 y.o. male with PMH recently diagnosed pAF and pericarditis presenting to  with recurrent fevers - for 1 month    -- pAfib  -- effusive-constrictive pericarditis s/p pericardiocentesis   -- left pleural effusion   -- h/o recurrent fevers   -- unintentional weight loss  -- night sweats.  ----- Negative pericardial fluid sampling studies  ----- Negative autoimmune eval.    Of [place] descent and has no toxic habits.  No foreign travel for ~ 3-4 years when he travelled to [facility]  He was discharged ~ 1 week earlier but returns with recurrent debilitating fevers.    Mom said he had lower Hb as a baby  Previous rheumatology consult -- Pericarditis, not responding to NSAIDs and colchicine, preceded by viral illness. No current signs or sx suggestive of RA, CTD, Behcet's, sarcoidosis, vasculitis or auto inflammatory diseases like FMF, patient was healthy prior to this episode    Review of Systems:    Constitutional: No concerns reported  Head and Neck: No concerns reported  Eyes:  Myopia  ENT:  No concerns reported  Respiratory:  Before tapping: could not take full deep breath -- need to take a cough  Cardiovascular: ache  Gastrointestinal:  No concerns reported  Genitourinary:  No concerns reported  Reproductive/Endocrine:  No concerns reported  Musculoskeletal:  No concerns reported, lower back pain for 3 years, no joint pain  Hematology/Lymphatic:  No concerns reported  Skin: No concerns reported, consistent rosacea at face  Neurological:  No concerns  "reported  Psychiatric: No concerns reported       Pertinent positive and negative ROS are listed in HPI, PMH and above, otherwise reviewed in detail for 15 systems and negative        Past Medical History:      No medical history until last month    Past Surgical History:      n/a    Allergies:  Nil        Medications:    n/a     Family History:    (Ref: scanned pedigree in the EMR)    Other than the above, upon our specific inquiries, there is no report of family history of intellectual disability or learning disability, autism, birth defect, multiple miscarriage or stillbirth, infant or early childhood death, consanguinity, and other known genetic disease.     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 Quitted for 2 years      Examination:       /75 (BP Location: Right arm, Patient Position: Lying)   Pulse 91   Temp 36.5 °C (97.7 °F) (Temporal)   Resp 17   Ht 1.956 m (6' 5\")   Wt 102 kg (225 lb)   SpO2 96%   BMI 26.68 kg/m²     General: No distress. Spontaneous movements. Appears well-developed and well-nourished.  Skin: No rashes or jaundice. Warm and dry.  HEENT:  Hair: No abnormal texture and distribution    Head Shape: normocephalic, atraumatic   Face:  no asymmetry for facial expression  Eyes:  No hypertelorism, no periorbital edema, no epicanthal folds. No discharge or swelling. No scleral icterus or injection. EOM intact bilaterally without nystagmus.   Ears:  set and rotation within normal limit, no pits or tags  Nose: no apparent dysmorphology found  Mouth: no microretrognathia,   Neck:  no webbing    Torso:   Chest: No respiratory distress. Breath sounds clear. No widely-spaced nipples. No pectus.   Heart: Warm and well-perfused without cyanosis. No murmur heard. No tachycardia or bradycardia  Abdomen:  Soft and flat. No TTP. No guarding, no rebound pain.   Back:  No CV angle knocking pain.  Genitalia: Deferred  Ext & Neuro:  Extremities:  No " evidence of joint contractures or hypermobility. No abnormal palmar creasing, no arachnodactyly, no clinodactyly, no syndactyly, no overlapping fingers. No pitting edema.   Neurological: Good head control. Spontaneous movements of arms and legs. Normal tone. No evidence of spasticity. There were no involuntary movements or tremors. Psychiatric: Oriented to person, place, and time. No abnormal mood and affect. No abnormal speech and behavior. Judgment and thought content normal.        Lab Results:  Admission on 11/17/2023  Component Date Value Ref Range Status   Sedimentation Rate 11/17/2023 43 (H)  0 - 15 mm/h Final   Extra Tube 11/17/2023 Hold for add-ons.   Final   Auto resulted.   Extra Tube 11/17/2023 Hold for add-ons.   Final   Auto resulted.   Extra Tube 11/17/2023 Hold for add-ons.   Final   Auto resulted.   Extra Tube 11/17/2023 Hold for add-ons.   Final   Auto resulted.   Extra Tube 11/17/2023 Hold for add-ons.   Final   Auto resulted.   POCT pH, Venous 11/17/2023 7.43  7.33 - 7.43 pH Final   POCT pCO2, Venous 11/17/2023 41  41 - 51 mm Hg Final   POCT pO2, Venous 11/17/2023 35  35 - 45 mm Hg Final   POCT SO2, Venous 11/17/2023 58  45 - 75 % Final   POCT Oxy Hemoglobin, Venous 11/17/2023 57.3  45.0 - 75.0 % Final   POCT Hematocrit Calculated, Venous 11/17/2023 37.0 (L)  41.0 - 52.0 % Final   POCT Sodium, Venous 11/17/2023 133 (L)  136 - 145 mmol/L Final   POCT Potassium, Venous 11/17/2023 4.0  3.5 - 5.3 mmol/L Final   POCT Chloride, Venous 11/17/2023 99  98 - 107 mmol/L Final   POCT Ionized Calicum, Venous 11/17/2023 1.14  1.10 - 1.33 mmol/L Final   POCT Glucose, Venous 11/17/2023 123 (H)  74 - 99 mg/dL Final   POCT Lactate, Venous 11/17/2023 1.0  0.4 - 2.0 mmol/L Final   POCT Base Excess, Venous 11/17/2023 2.6  -2.0 - 3.0 mmol/L Final   POCT HCO3 Calculated, Venous 11/17/2023 27.2 (H)  22.0 - 26.0 mmol/L Final   POCT Hemoglobin, Venous 11/17/2023 12.4 (L)  13.5 - 17.5 g/dL Final   POCT Anion Gap,  Venous 11/17/2023 11.0  10.0 - 25.0 mmol/L Final   Patient Temperature 11/17/2023 37.0  degrees Celsius Final   FiO2 11/17/2023 21  % Final   Coronavirus 2019, PCR 11/17/2023 Not Detected  Not Detected Final   Flu A Result 11/17/2023 Not Detected  Not Detected Final   Flu B Result 11/17/2023 Not Detected  Not Detected Final   BNP 11/17/2023 53  0 - 99 pg/mL Final   WBC 11/17/2023 17.4 (H)  4.4 - 11.3 x10*3/uL Final   nRBC 11/17/2023 0.0  0.0 - 0.0 /100 WBCs Final   RBC 11/17/2023 4.87  4.50 - 5.90 x10*6/uL Final   Hemoglobin 11/17/2023 13.0 (L)  13.5 - 17.5 g/dL Final   Hematocrit 11/17/2023 41.0  41.0 - 52.0 % Final   MCV 11/17/2023 84  80 - 100 fL Final   MCH 11/17/2023 26.7  26.0 - 34.0 pg Final   MCHC 11/17/2023 31.7 (L)  32.0 - 36.0 g/dL Final   RDW 11/17/2023 12.6  11.5 - 14.5 % Final   Platelets 11/17/2023 581 (H)  150 - 450 x10*3/uL Final   Neutrophils % 11/17/2023 81.2  40.0 - 80.0 % Final   Immature Granulocytes %, Automated 11/17/2023 0.5  0.0 - 0.9 % Final   Immature Granulocyte Count (IG) includes promyelocytes, myelocytes and metamyelocytes but does not include bands. Percent differential counts (%) should be interpreted in the context of the absolute cell counts (cells/UL).   Lymphocytes % 11/17/2023 9.9  13.0 - 44.0 % Final   Monocytes % 11/17/2023 7.5  2.0 - 10.0 % Final   Eosinophils % 11/17/2023 0.5  0.0 - 6.0 % Final   Basophils % 11/17/2023 0.4  0.0 - 2.0 % Final   Neutrophils Absolute 11/17/2023 14.14 (H)  1.20 - 7.70 x10*3/uL Final   Percent differential counts (%) should be interpreted in the context of the absolute cell counts (cells/uL).   Immature Granulocytes Absolute, Au* 11/17/2023 0.09  0.00 - 0.70 x10*3/uL Final   Lymphocytes Absolute 11/17/2023 1.73  1.20 - 4.80 x10*3/uL Final   Monocytes Absolute 11/17/2023 1.30 (H)  0.10 - 1.00 x10*3/uL Final   Eosinophils Absolute 11/17/2023 0.08  0.00 - 0.70 x10*3/uL Final   Basophils Absolute 11/17/2023 0.07  0.00 - 0.10  x10*3/uL Final   Glucose 11/17/2023 103 (H)  74 - 99 mg/dL Final   Sodium 11/17/2023 134 (L)  136 - 145 mmol/L Final   Potassium 11/17/2023 4.1  3.5 - 5.3 mmol/L Final   Chloride 11/17/2023 95 (L)  98 - 107 mmol/L Final   Bicarbonate 11/17/2023 28  21 - 32 mmol/L Final   Anion Gap 11/17/2023 15  10 - 20 mmol/L Final   Urea Nitrogen 11/17/2023 13  6 - 23 mg/dL Final   Creatinine 11/17/2023 0.90  0.50 - 1.30 mg/dL Final   eGFR 11/17/2023 >90  >60 mL/min/1.73m*2 Final   Calculations of estimated GFR are performed using the 2021 CKD-EPI Study Refit equation without the race variable for the IDMS-Traceable creatinine methods.  https://jasn.asnjournals.org/content/early/2021/09/22/ASN.8154444694   Calcium 11/17/2023 9.7  8.6 - 10.6 mg/dL Final   Albumin 11/17/2023 4.6  3.4 - 5.0 g/dL Final   Alkaline Phosphatase 11/17/2023 91  33 - 120 U/L Final   Total Protein 11/17/2023 7.9  6.4 - 8.2 g/dL Final   AST 11/17/2023 13  9 - 39 U/L Final   Bilirubin, Total 11/17/2023 0.7  0.0 - 1.2 mg/dL Final   ALT 11/17/2023 29  10 - 52 U/L Final   Patients treated with Sulfasalazine may generate falsely decreased results for ALT.   Troponin I, High Sensitivity 11/17/2023 6  0 - 53 ng/L Final   Blood Culture 11/17/2023 No growth at 3 days   Preliminary   Blood Culture 11/17/2023 No growth at 3 days   Preliminary   Cytomegalovirus DNA, PCR Log IU/ML 11/17/2023    Final   Not calculated   CMV DNA Result 11/17/2023 Not Detected  Not Detected Final   HSV-1 Whole Blood 11/17/2023 Not Detected  Not Detected Final   HSV-2 Whole blood 11/17/2023 Not Detected  Not Detected Final   EBV VCA, IgG  11/17/2023 Negative  Negative Final   EBV VCA, IgM  11/17/2023    Final   Due to reagent shortage, the EBV VCA IgM test has been temporarily sent out to [facility].    EBV Early Antigen Antibody, IgG 11/17/2023 Negative  Negative Final   EBV Nuclear Antigen Antibody, IgG 11/17/2023 Negative  Negative Final   C-Reactive Protein 11/17/2023 26.26 (H)   <1.00 mg/dL Final   EBV DNA Result 11/17/2023 Not Detected  Not Detected Final   EBV PCR Plasma Log 11/17/2023    Final   Not calculated   WBC 11/18/2023 11.9 (H)  4.4 - 11.3 x10*3/uL Final   nRBC 11/18/2023 0.0  0.0 - 0.0 /100 WBCs Final   RBC 11/18/2023 4.98  4.50 - 5.90 x10*6/uL Final   Hemoglobin 11/18/2023 13.4 (L)  13.5 - 17.5 g/dL Final   Hematocrit 11/18/2023 41.2  41.0 - 52.0 % Final   MCV 11/18/2023 83  80 - 100 fL Final   MCH 11/18/2023 26.9  26.0 - 34.0 pg Final   MCHC 11/18/2023 32.5  32.0 - 36.0 g/dL Final   RDW 11/18/2023 13.1  11.5 - 14.5 % Final   Platelets 11/18/2023 302  150 - 450 x10*3/uL Final   Neutrophils % 11/18/2023 77.0  40.0 - 80.0 % Final   Immature Granulocytes %, Automated 11/18/2023 0.3  0.0 - 0.9 % Final   Immature Granulocyte Count (IG) includes promyelocytes, myelocytes and metamyelocytes but does not include bands. Percent differential counts (%) should be interpreted in the context of the absolute cell counts (cells/UL).   Lymphocytes % 11/18/2023 13.4  13.0 - 44.0 % Final   Monocytes % 11/18/2023 7.4  2.0 - 10.0 % Final   Eosinophils % 11/18/2023 1.5  0.0 - 6.0 % Final   Basophils % 11/18/2023 0.4  0.0 - 2.0 % Final   Neutrophils Absolute 11/18/2023 9.11 (H)  1.20 - 7.70 x10*3/uL Final   Percent differential counts (%) should be interpreted in the context of the absolute cell counts (cells/uL).   Immature Granulocytes Absolute, Au* 11/18/2023 0.04  0.00 - 0.70 x10*3/uL Final   Lymphocytes Absolute 11/18/2023 1.59  1.20 - 4.80 x10*3/uL Final   Monocytes Absolute 11/18/2023 0.88  0.10 - 1.00 x10*3/uL Final   Eosinophils Absolute 11/18/2023 0.18  0.00 - 0.70 x10*3/uL Final   Basophils Absolute 11/18/2023 0.05  0.00 - 0.10 x10*3/uL Final   Glucose 11/18/2023 95  74 - 99 mg/dL Final   Sodium 11/18/2023 137  136 - 145 mmol/L Final   Potassium 11/18/2023 3.9  3.5 - 5.3 mmol/L Final   Chloride 11/18/2023 98  98 - 107 mmol/L Final   Bicarbonate 11/18/2023 29  21 - 32 mmol/L Final   Anion  Gap 11/18/2023 14  10 - 20 mmol/L Final   Urea Nitrogen 11/18/2023 12  6 - 23 mg/dL Final   Creatinine 11/18/2023 0.80  0.50 - 1.30 mg/dL Final   eGFR 11/18/2023 >90  >60 mL/min/1.73m*2 Final   Calculations of estimated GFR are performed using the 2021 CKD-EPI Study Refit equation without the race variable for the IDMS-Traceable creatinine methods.  https://jasn.asnjournals.org/content/early/2021/09/22/ASN.1348929486   Calcium 11/18/2023 9.6  8.6 - 10.6 mg/dL Final   Phosphorus 11/18/2023 3.6  2.5 - 4.9 mg/dL Final   The performance characteristics of phosphorus testing in heparinized plasma have been validated by the individual  laboratory site where testing is performed. Testing on heparinized plasma is not approved by the FDA; however, such approval is not necessary.   Albumin 11/18/2023 4.0  3.4 - 5.0 g/dL Final   Magnesium 11/18/2023 2.01  1.60 - 2.40 mg/dL Final   LV biplane EF 11/18/2023 56   Final   Tricuspid annular plane systolic e* 11/18/2023 2.6   Final   RV free wall pk S' 11/18/2023 14.00   Final   LVIDd 11/18/2023 5.60   Final   LV A4C EF 11/18/2023 59.1   Final   WBC 11/19/2023 8.1  4.4 - 11.3 x10*3/uL Final   nRBC 11/19/2023 0.0  0.0 - 0.0 /100 WBCs Final   RBC 11/19/2023 4.17 (L)  4.50 - 5.90 x10*6/uL Final   Hemoglobin 11/19/2023 11.2 (L)  13.5 - 17.5 g/dL Final   Hematocrit 11/19/2023 36.1 (L)  41.0 - 52.0 % Final   MCV 11/19/2023 87  80 - 100 fL Final   MCH 11/19/2023 26.9  26.0 - 34.0 pg Final   MCHC 11/19/2023 31.0 (L)  32.0 - 36.0 g/dL Final   RDW 11/19/2023 12.7  11.5 - 14.5 % Final   Platelets 11/19/2023 429  150 - 450 x10*3/uL Final   Neutrophils % 11/19/2023 66.0  40.0 - 80.0 % Final   Immature Granulocytes %, Automated 11/19/2023 0.4  0.0 - 0.9 % Final   Immature Granulocyte Count (IG) includes promyelocytes, myelocytes and metamyelocytes but does not include bands. Percent differential counts (%) should be interpreted in the context of the absolute cell counts  (cells/UL).   Lymphocytes % 11/19/2023 19.2  13.0 - 44.0 % Final   Monocytes % 11/19/2023 11.1  2.0 - 10.0 % Final   Eosinophils % 11/19/2023 2.8  0.0 - 6.0 % Final   Basophils % 11/19/2023 0.5  0.0 - 2.0 % Final   Neutrophils Absolute 11/19/2023 5.38  1.20 - 7.70 x10*3/uL Final   Percent differential counts (%) should be interpreted in the context of the absolute cell counts (cells/uL).   Immature Granulocytes Absolute, Au* 11/19/2023 0.03  0.00 - 0.70 x10*3/uL Final   Lymphocytes Absolute 11/19/2023 1.56  1.20 - 4.80 x10*3/uL Final   Monocytes Absolute 11/19/2023 0.90  0.10 - 1.00 x10*3/uL Final   Eosinophils Absolute 11/19/2023 0.23  0.00 - 0.70 x10*3/uL Final   Basophils Absolute 11/19/2023 0.04  0.00 - 0.10 x10*3/uL Final   Glucose 11/19/2023 79  74 - 99 mg/dL Final   Sodium 11/19/2023 138  136 - 145 mmol/L Final   Potassium 11/19/2023 4.3  3.5 - 5.3 mmol/L Final   Chloride 11/19/2023 99  98 - 107 mmol/L Final   Bicarbonate 11/19/2023 29  21 - 32 mmol/L Final   Anion Gap 11/19/2023 14  10 - 20 mmol/L Final   Urea Nitrogen 11/19/2023 12  6 - 23 mg/dL Final   Creatinine 11/19/2023 0.85  0.50 - 1.30 mg/dL Final   eGFR 11/19/2023 >90  >60 mL/min/1.73m*2 Final   Calculations of estimated GFR are performed using the 2021 CKD-EPI Study Refit equation without the race variable for the IDMS-Traceable creatinine methods.  https://jasn.asnjournals.org/content/early/2021/09/22/ASN.7593310502   Calcium 11/19/2023 10.0  8.6 - 10.6 mg/dL Final   Phosphorus 11/19/2023 4.6  2.5 - 4.9 mg/dL Final   The performance characteristics of phosphorus testing in heparinized plasma have been validated by the individual  laboratory site where testing is performed. Testing on heparinized plasma is not approved by the FDA; however, such approval is not necessary.   Albumin 11/19/2023 4.2  3.4 - 5.0 g/dL Final   Magnesium 11/19/2023 2.28  1.60 - 2.40 mg/dL Final   Citrulline Antibody, IgG 11/19/2023 <1  <3 U/mL Final   NEGATIVE < 3  U/ML  POSITIVE >=3 U/ML   POCT Glucose 11/20/2023 96  74 - 99 mg/dL Final   Amylase, Fluid 11/20/2023 17  Not established. U/L Final   Color, Fluid 11/20/2023 Orange (A)  Colorless, Straw, Yellow Final   Clarity, Fluid 11/20/2023 Cloudy (A)  Clear Final   WBC, Fluid 11/20/2023 2,560  See Comment /uL Final   RBC, Fluid 11/20/2023 12,000  0  /uL /uL Final   Neutrophils %, Manual, Fluid 11/20/2023 21  <25 % % Final   Lymphocytes %, Manual, Fluid 11/20/2023 23  <75 % % Final   Mono/Macrophages %, Manual, Fluid 11/20/2023 31  <70 % % Final   Eosinophils %, Manual, Fluid 11/20/2023 13  0 % % Final   Basophils %, Manual, Fluid 11/20/2023 0  0 % % Final   Immature Granulocytes %, Manual, F* 11/20/2023 0  0 % % Final   Blasts %, Manual, Fluid 11/20/2023 0  0 % % Final   Unclassified Cells %, Manual, Fluid 11/20/2023 12 (H)  0 % % Final   Plasma Cells %, Manual, Fluid 11/20/2023 0  0 % % Final   Total Cells Counted, Fluid 11/20/2023 100   Final   Glucose, Fluid 11/20/2023 104  Not established mg/dL Final   LD, Fluid 11/20/2023 126  Not established. U/L Final   Protein, Total Fluid 11/20/2023 4.8  Not established g/dL Final   LDH 11/19/2023 101  84 - 246 U/L Final   Total Protein 11/19/2023 7.0  6.4 - 8.2 g/dL Final   Thyroid Stimulating Hormone 11/19/2023 1.49  0.44 - 3.98 mIU/L Final       Diagnostic Imaging:      PET inpatient w CMO approval call [id number]  Final Result  1. Nonspecific mildly hypermetabolic nonenlarged right  supraclavicular and subcarinal lymph nodes which may be reactive.  2. Hypermetabolic small to moderate pericardial effusion which is  decreased in size from prior 10/31/2023 CTA. A persistent  inflammatory/infectious process cannot be excluded.  3. Increased moderate left pleural effusion with associated  atelectasis without abnormal metabolic activity. Previously seen  small right pleural effusion and associated atelectasis/opacities  have improved.  4. Borderline splenomegaly without associated  hypermetabolic activity  to suggest lymphomatous involvement.          This study has been interpreted at [facility] in [place], [place].      MACRO:  None      Signed by: Xavier Lux 11/20/2023 2:11 PM  Dictation workstation:   [id number]    Transthoracic Echo (TTE) Limited  Final Result    Point of Care Ultrasound  Final Result    XR chest 1 view  Final Result  SMALL LEFT PLEURAL EFFUSION.  ___   Signed by Alexi Jarrett MD    Point of Care Ultrasound    (Results Pending)  US thoracentesis    (Results Pending)      Assessment & Recommendations:    Jonah Britton is a 33 y.o. male with:   -- pAfib  -- effusive-constrictive pericarditis s/p pericardiocentesis   -- left pleural effusion s/p thoracentesis  -- recurrent fevers in the past month  -- unintentional weight loss  -- night sweats.  ----- Negative pericardial fluid sampling studies  ----- Negative autoimmune eval.       Reviewing [facility] clinical criteria:   -- Major features include fever (recurrent in 1 month), abdominal pain (negative), chest pain (negative), joint pain (negative), and skin eruption (negative)  -- Minor features include increased erythrocyte sedimentation rate (increased), leukocytosis (increased) , and elevated serum fibrinogen (elevated D-Dimer).    Family history: negative. Father side of [place] descent.    We discussed the genetic testing meaning, and discussed the pros and cons of the test. We also discussed the alternatives.     We discussed:  - the basics of genetics  - the potential benefits or information for the patient and for the other family members  - benefits, risks, and alternatives    Recommendations:   -- Following the discussion, the patient decided not to undergo genetic testing at this time.    -- The germline genetic test can be performed at any time - does not need to be performed during the acute episode, so if the patient opts for further genetic evaluation in the future,  they can be followed up in the clinic and arrange for genetic testing.        Attending Physician Statement:        The history was reviewed with the family and is detailed above. I performed a physical examination which is documented above. The impression and plan were reviewed with the patient/family extensively and are documented above. I have reviewed and edited the above note. Greater than 50% of the time was spent on patient counseling and coordination of care.    Face to face time: 47 min     --  Caridad Tobin MD, PhD  Director, Urogenetics Program, Department of Genetics and Genome Sciences  Chief, Urogenetics Division, U[facility] [place]   and Attending in Genetics and Urology  [facility], and [facility]

## 2023-11-22 LAB — SCAN RESULT: NORMAL

## 2023-11-23 LAB
BACTERIA FLD CULT: NORMAL
GRAM STN SPEC: NORMAL
GRAM STN SPEC: NORMAL

## 2023-11-27 LAB
ATRIAL RATE: 64 BPM
P AXIS: 66 DEGREES
P OFFSET: 194 MS
P ONSET: 142 MS
PR INTERVAL: 160 MS
Q ONSET: 222 MS
QRS COUNT: 11 BEATS
QRS DURATION: 78 MS
QT INTERVAL: 382 MS
QTC CALCULATION(BAZETT): 394 MS
QTC FREDERICIA: 390 MS
R AXIS: 47 DEGREES
T AXIS: 28 DEGREES
T OFFSET: 413 MS
VENTRICULAR RATE: 64 BPM

## 2023-11-29 ENCOUNTER — HOSPITAL ENCOUNTER (OUTPATIENT)
Dept: CARDIOLOGY | Facility: HOSPITAL | Age: 33
Discharge: HOME | End: 2023-11-29
Payer: COMMERCIAL

## 2023-11-29 DIAGNOSIS — I30.0 ACUTE IDIOPATHIC PERICARDITIS (HHS-HCC): ICD-10-CM

## 2023-11-29 PROCEDURE — 93306 TTE W/DOPPLER COMPLETE: CPT

## 2023-11-29 PROCEDURE — 93306 TTE W/DOPPLER COMPLETE: CPT | Performed by: INTERNAL MEDICINE

## 2023-11-30 ENCOUNTER — PHARMACY VISIT (OUTPATIENT)
Dept: PHARMACY | Facility: CLINIC | Age: 33
End: 2023-11-30
Payer: COMMERCIAL

## 2023-11-30 LAB
AORTIC VALVE PEAK VELOCITY: 1.44
AV PEAK GRADIENT: 8.3
AVA (PEAK VEL): 4.76
EJECTION FRACTION APICAL 4 CHAMBER: 61
EJECTION FRACTION: 57
LEFT ATRIUM VOLUME AREA LENGTH INDEX BSA: 29
LEFT VENTRICLE INTERNAL DIMENSION DIASTOLE: 5.7 (ref 3.5–6)
LEFT VENTRICULAR OUTFLOW TRACT DIAMETER: 2.6
MITRAL VALVE E/A RATIO: 1.4
MITRAL VALVE E/E' RATIO: 5.08
RIGHT VENTRICLE FREE WALL PEAK S': 11.7
TRICUSPID ANNULAR PLANE SYSTOLIC EXCURSION: 1.5

## 2023-11-30 PROCEDURE — RXMED WILLOW AMBULATORY MEDICATION CHARGE

## 2023-12-05 PROCEDURE — RXMED WILLOW AMBULATORY MEDICATION CHARGE

## 2023-12-07 ENCOUNTER — LAB (OUTPATIENT)
Dept: LAB | Facility: LAB | Age: 33
End: 2023-12-07
Payer: COMMERCIAL

## 2023-12-07 ENCOUNTER — PHARMACY VISIT (OUTPATIENT)
Dept: PHARMACY | Facility: CLINIC | Age: 33
End: 2023-12-07
Payer: COMMERCIAL

## 2023-12-07 DIAGNOSIS — I30.0 ACUTE IDIOPATHIC PERICARDITIS (HHS-HCC): ICD-10-CM

## 2023-12-07 LAB — CRP SERPL-MCNC: 2.64 MG/DL

## 2023-12-07 PROCEDURE — 36415 COLL VENOUS BLD VENIPUNCTURE: CPT

## 2023-12-07 PROCEDURE — 86140 C-REACTIVE PROTEIN: CPT

## 2023-12-19 ENCOUNTER — OFFICE VISIT (OUTPATIENT)
Dept: PRIMARY CARE | Facility: CLINIC | Age: 33
End: 2023-12-19
Payer: COMMERCIAL

## 2023-12-19 VITALS
HEIGHT: 75 IN | BODY MASS INDEX: 28.6 KG/M2 | DIASTOLIC BLOOD PRESSURE: 80 MMHG | SYSTOLIC BLOOD PRESSURE: 126 MMHG | WEIGHT: 230 LBS | HEART RATE: 72 BPM

## 2023-12-19 DIAGNOSIS — R79.82 CRP ELEVATED: ICD-10-CM

## 2023-12-19 DIAGNOSIS — I31.39 PERICARDIAL EFFUSION (HHS-HCC): ICD-10-CM

## 2023-12-19 DIAGNOSIS — I30.1 ACUTE VIRAL PERICARDITIS (HHS-HCC): Primary | ICD-10-CM

## 2023-12-19 PROBLEM — I30.0 ACUTE IDIOPATHIC PERICARDITIS (HHS-HCC): Status: RESOLVED | Noted: 2023-11-01 | Resolved: 2023-12-19

## 2023-12-19 PROBLEM — M62.9 HAMSTRING TIGHTNESS OF BOTH LOWER EXTREMITIES: Status: RESOLVED | Noted: 2023-11-12 | Resolved: 2023-12-19

## 2023-12-19 PROBLEM — S39.012A STRAIN OF LUMBAR PARASPINAL MUSCLE, INITIAL ENCOUNTER: Status: RESOLVED | Noted: 2023-11-12 | Resolved: 2023-12-19

## 2023-12-19 PROBLEM — R50.9 FEVER, UNSPECIFIED FEVER CAUSE: Status: RESOLVED | Noted: 2023-11-17 | Resolved: 2023-12-19

## 2023-12-19 PROCEDURE — 1036F TOBACCO NON-USER: CPT | Performed by: FAMILY MEDICINE

## 2023-12-19 PROCEDURE — 99204 OFFICE O/P NEW MOD 45 MIN: CPT | Performed by: FAMILY MEDICINE

## 2023-12-19 ASSESSMENT — PATIENT HEALTH QUESTIONNAIRE - PHQ9
2. FEELING DOWN, DEPRESSED OR HOPELESS: NOT AT ALL
1. LITTLE INTEREST OR PLEASURE IN DOING THINGS: NOT AT ALL
SUM OF ALL RESPONSES TO PHQ9 QUESTIONS 1 AND 2: 0

## 2023-12-19 NOTE — ASSESSMENT & PLAN NOTE
CRP was last done December 7, it was still a bit elevated, so that may need to be evaluated going forward to determine whether or not medication can be discontinued

## 2023-12-19 NOTE — PROGRESS NOTES
Subjective   Patient ID: Jonah Britton is a 33 y.o. male who presents for Annual Exam.    Past Medical, Surgical, and Family History reviewed and updated in chart.    Reviewed all medications by prescribing practitioner or clinical pharmacist (such as prescriptions, OTCs, herbal therapies and supplements) and documented in the medical record.    IVIS Mckenzie has come to establish care at the suggestion of his cousin, Cruz Yves Britton. He has generally been in good health without any medical concerns or complaints until about a month ago.    His health issues began in early October when he developed a severe chest cold. He experienced a lingering cough but attributed it to a simple cold, feeling as though he had recovered by the end of October.    However, on October 24, he visited the emergency department for the first time and was diagnosed with acute pericardial effusion, acute pericarditis, and atrial fibrillation with RVR. Upon discharge, he was prescribed colchicine, ibuprofen, metoprolol, and pantoprazole. However, due to a lack of improvement, he was transferred to the main campus for further evaluation by multiple teams.    An echocardiogram revealed a small to moderate pericardial effusion with fibrinous material and a thickened pericardium, but no constrictive pericarditis. Chart review indicates negative results for herpes 6 and herpes 8. However, after a thoracentesis, he tested positive for Coxsackie A serotypes. Thus, it is believed that he likely developed pericarditis as a result of the Coxsackievirus.    A PET scan was conducted to rule out potential malignancies given the patient's symptoms. The scan showed mildly hypermetabolic non-enlarged lymph nodes, a hypermetabolic pericardial effusion, moderately increased left pleural effusion with associated atelectasis, and borderline splenomegaly without lymphomatous involvement.    Genetics was consulted to evaluate for a potential MEFV mutation, but  Jonah declined genetic testing. He was discharged in stable condition with arranged follow-ups with Cardiology.    His appointment with Cardiology scheduled for this afternoon was postponed for at least another 3 weeks. He is interested in exploring whether I can assist him in arranging for a local cardiac consultation to further evaluate his ongoing medical therapy.    Presently, Jonah reports feeling well, is asymptomatic, and has been exercising without any residual symptoms. He was told that he might need to stay on his current medication for at least 3 months, possibly longer, and is seeking guidance from Cardiology regarding his medication regimen and duration.    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 He enjoys snowboarding and other physical activities and hopes to continue participating in such events without restrictions.    Review of Systems  GENERAL - No reported fever, fatigue or chills  SKIN - No reported rash, new skin lesions, or change in moles  EYES - No reported blurred vision, or change in visual acuity  EARS - No reported ear pain, discharge, ringing, or difficulty hearing  NOSE - No reported nasal congestion, discharge, or bleeding  MOUTH - No reported sore throat, postnasal drip or painful/difficulty swallowing  NECK - No reported pain or swelling  RESPIRATORY - No reported shortness of breath, cough, wheezing  CARDIOVASCULAR - No reported palpitations, chest pain, orthopnea, peripheral edema, syncope or claudication  GASTROINTESTINAL - No reported nausea, vomiting, diarrhea, constipation, abdominal pain, melena and or bright red blood  GENITOURINARY - No reported dysuria, frequency of urination, urgency, or hesitancy  MUSCULOSKELETAL - No reported joint or muscle pain, or back pain  NEUROLOGICAL - No reported localized numbness, weakness, or tingling  PSYCHIATRIC - No reported depression, anxiety, substance abuse, suicidal  or homicidal ideation  ENDOCRINE - No reported heat or cold intolerance, weight loss or gain, increasing thirst  HEMATO-IMMUNOLOGIC - No reported easy bruising, bleeding, oral ulcerations or recurrent infections    History reviewed. No pertinent past medical history.  Past Surgical History:   Procedure Laterality Date    CARDIAC CATHETERIZATION N/A 2023    Procedure: Pericardiocentesis;  Surgeon: Jazmyn Rich MD;  Location: [place] [address] [facility];  Service: Cardiovascular;  Laterality: N/A;     Social History     Tobacco Use    Smoking status: Former     Types: Cigarettes     Start date:      Quit date: 10/1/2019     Years since quittin.2    Smokeless tobacco: Never    Tobacco comments:     Smoked about 1 pack of cigarettes per week for about 7 years   Substance Use Topics    Alcohol use: Yes     Alcohol/week: 2.0 standard drinks of alcohol     Types: 2 Standard drinks or equivalent per week    Drug use: Never     Family History   Problem Relation Name Age of Onset    Hypothyroidism Father      Breast cancer Paternal Grandmother      Skin cancer Paternal Grandmother      Hypothyroidism Paternal Grandmother      Atrial fibrillation Paternal Grandmother      Alzheimer's disease Paternal Grandfather       Immunization History   Administered Date(s) Administered    DTP 1991, 1992, 1992    DTaP, Unspecified 1993, 1996    Flu vaccine (IIV4), preservative free *Check age/dose* 2019    Hepatitis B vaccine, pediatric/adolescent (RECOMBIVAX, ENGERIX) 08/10/1999, 1999, 2000    HiB PRP-T conjugate vaccine (HIBERIX, ACTHIB) 1991, 1992, 1992, 1993    MMR vaccine, subcutaneous (MMR II) 1993, 08/10/1999    Meningococcal MCV4P 2010    OPV 1991, 1992, 1993, 1996    Pfizer Purple Cap SARS-CoV-2 10/06/2021, 10/28/2021    Td (adult), unspecified 2004    Tdap vaccine, age 7 year and older (BOOSTRIX)  "06/04/2010, 01/17/2019     Current Outpatient Medications   Medication Instructions    colchicine 0.6 mg, oral, 2 times daily    ibuprofen 600 mg, oral, Every 12 hours    metoprolol succinate XL (Toprol-XL) 50 mg 24 hr tablet Take 1 tablet (50 mg) by mouth once daily. Do not crush or chew. Do not start before November 8, 2023.    pantoprazole (ProtoNix) 40 mg EC tablet Take 1 tablet (40 mg) by mouth once daily in the morning. Take before meals. Do not crush, chew, or split. Do not start before November 8, 2023.     No Known Allergies    Objective   Vitals:    12/19/23 1012   BP: 126/80   Pulse: 72   Weight: 104 kg (230 lb)   Height: 1.905 m (6' 3\")     Body mass index is 28.75 kg/m².    BP Readings from Last 3 Encounters:   12/19/23 126/80   11/21/23 109/70   11/14/23 122/70      Wt Readings from Last 3 Encounters:   12/19/23 104 kg (230 lb)   11/17/23 102 kg (225 lb)   11/14/23 102 kg (224 lb)        Lab on 12/07/2023   Component Date Value    C-Reactive Protein 12/07/2023 2.64 (H)    Hospital Outpatient Visit on 11/29/2023   Component Date Value    AV pk jay 11/29/2023 1.44     LVOT diam 11/29/2023 2.60     LV biplane EF 11/29/2023 57     MV avg E/e' ratio 11/29/2023 5.08     MV E/A ratio 11/29/2023 1.40     LA vol index A/L 11/29/2023 29.0     Tricuspid annular plane * 11/29/2023 1.5     RV free wall pk S' 11/29/2023 11.70     LVIDd 11/29/2023 5.70     Aortic Valve Area by Con* 11/29/2023 4.76     AV pk grad 11/29/2023 8.3     LV A4C EF 11/29/2023 61.0    No results displayed because visit has over 200 results.        Physical Exam  CONSTITUTIONAL - well nourished, well developed, looks like stated age, in no acute distress, not ill-appearing, and not tired appearing  SKIN - normal skin color and pigmentation, normal skin turgor without rash, lesions, or nodules visualized  HEAD - no trauma, normocephalic  EYES - pupils are equal and reactive to light, extraocular muscles are intact, and normal external exam  ENT " - TM's intact, no injection, no signs of infection, uvula midline, normal tongue movement and throat normal, no exudate  NECK - supple without rigidity, no neck mass was observed, no thyromegaly or thyroid nodules  CHEST - clear to auscultation, no wheezing, no crackles and no rales, good effort  CARDIAC - regular rate and regular rhythm, no skipped beats, no murmur  EXTREMITIES - no obvious or evident edema, no obvious or evident deformities  NEUROLOGICAL - normal gait, normal balance, normal motor, no ataxia, alert, oriented and no focal signs  PSYCHIATRIC - alert, pleasant and cordial, age-appropriate  IMMUNOLOGIC - no cervical lymphadenopathy    Assessment/Plan   Problem List Items Addressed This Visit       Pericardial effusion     Echo Report (11/18/2023)  1. The left ventricular systolic function is normal with an estimated ejection fraction of 55%.  2. A small to moderate pericardial effusion is noted lateral to the left ventricle.  3. The pericardial effusion appears to contain fibrinous material.  4. Thickening of the pericardium is observed lateral to the left ventricle.  5. There is no evidence of constrictive pericarditis.  6. Compared with the study from 11/7/2023, the degree of pericardial effusion has decreased from moderate to mild to moderate. It continues to appear organized with fibrinous material. The IVC is no longer dilated and continues to have >50% inspiratory collapse.         Relevant Orders    Referral to Cardiology    Acute viral pericarditis - Primary     I am presuming that the elevated levels of your coxsackie a serotype indicate the likely cause of your pericarditis. I'm pleased to hear that you're currently asymptomatic and no longer experiencing atrial fibrillation.    I will assist in arranging an appointment with Dr. Birch and the Cardiology team for further evaluation and medical management. In the meantime, I advise you to continue your current medication regimen without any  "changes. As for physical activity, you should only do what you can tolerate. The notion of \"no pain, no gain\" doesn't apply to you until you're cleared by Cardiology.         Relevant Orders    Referral to Cardiology    CRP elevated     CRP was last done December 7, it was still a bit elevated, so that may need to be evaluated going forward to determine whether or not medication can be discontinued         Relevant Orders    Referral to Cardiology     "

## 2023-12-19 NOTE — ASSESSMENT & PLAN NOTE
"I am presuming that the elevated levels of your coxsackie a serotype indicate the likely cause of your pericarditis. I'm pleased to hear that you're currently asymptomatic and no longer experiencing atrial fibrillation.    I will assist in arranging an appointment with Dr. Birch and the Cardiology team for further evaluation and medical management. In the meantime, I advise you to continue your current medication regimen without any changes. As for physical activity, you should only do what you can tolerate. The notion of \"no pain, no gain\" doesn't apply to you until you're cleared by Cardiology.  "

## 2023-12-19 NOTE — ASSESSMENT & PLAN NOTE
Echo Report (11/18/2023)  1. The left ventricular systolic function is normal with an estimated ejection fraction of 55%.  2. A small to moderate pericardial effusion is noted lateral to the left ventricle.  3. The pericardial effusion appears to contain fibrinous material.  4. Thickening of the pericardium is observed lateral to the left ventricle.  5. There is no evidence of constrictive pericarditis.  6. Compared with the study from 11/7/2023, the degree of pericardial effusion has decreased from moderate to mild to moderate. It continues to appear organized with fibrinous material. The IVC is no longer dilated and continues to have >50% inspiratory collapse.

## 2023-12-20 DIAGNOSIS — I32: ICD-10-CM

## 2023-12-20 DIAGNOSIS — Q21.10: ICD-10-CM

## 2023-12-20 RX ORDER — PANTOPRAZOLE SODIUM 40 MG/1
40 TABLET, DELAYED RELEASE ORAL
Qty: 30 TABLET | Refills: 0 | Status: CANCELLED | OUTPATIENT
Start: 2023-12-20 | End: 2024-01-19

## 2023-12-20 RX ORDER — IBUPROFEN 600 MG/1
600 TABLET ORAL EVERY 12 HOURS
Qty: 60 TABLET | Refills: 0 | Status: CANCELLED | OUTPATIENT
Start: 2023-12-20 | End: 2024-01-19

## 2023-12-21 ENCOUNTER — TELEPHONE (OUTPATIENT)
Dept: CARDIOLOGY | Facility: CLINIC | Age: 33
End: 2023-12-21
Payer: COMMERCIAL

## 2023-12-21 ENCOUNTER — APPOINTMENT (OUTPATIENT)
Dept: CARDIOLOGY | Facility: CLINIC | Age: 33
End: 2023-12-21
Payer: COMMERCIAL

## 2023-12-21 NOTE — TELEPHONE ENCOUNTER
I cannot see this patient.  He is in the middle of an ongoing evaluation and treatment with over 6 cardiologists over the last 6 weeks.  He needs to follow up with Dr. Madden, Dany Avendaño or any of the others he has seen recently. Changing in mid course is not advisable.

## 2023-12-27 LAB
ACID FAST STN SPEC: NORMAL
MYCOBACTERIUM SPEC CULT: NORMAL

## 2023-12-30 PROCEDURE — RXMED WILLOW AMBULATORY MEDICATION CHARGE

## 2024-01-02 DIAGNOSIS — Q21.10: ICD-10-CM

## 2024-01-02 DIAGNOSIS — I32: ICD-10-CM

## 2024-01-03 RX ORDER — PANTOPRAZOLE SODIUM 40 MG/1
40 TABLET, DELAYED RELEASE ORAL
Qty: 90 TABLET | Refills: 0 | Status: SHIPPED | OUTPATIENT
Start: 2024-01-03 | End: 2024-04-30 | Stop reason: WASHOUT

## 2024-01-04 ENCOUNTER — PHARMACY VISIT (OUTPATIENT)
Dept: PHARMACY | Facility: CLINIC | Age: 34
End: 2024-01-04
Payer: COMMERCIAL

## 2024-01-09 LAB
ACID FAST STN SPEC: NORMAL
MYCOBACTERIUM SPEC CULT: NORMAL

## 2024-01-11 ENCOUNTER — OFFICE VISIT (OUTPATIENT)
Dept: ORTHOPEDIC SURGERY | Facility: HOSPITAL | Age: 34
End: 2024-01-11
Payer: COMMERCIAL

## 2024-01-11 ENCOUNTER — OFFICE VISIT (OUTPATIENT)
Dept: CARDIOLOGY | Facility: HOSPITAL | Age: 34
End: 2024-01-11
Payer: COMMERCIAL

## 2024-01-11 VITALS
HEIGHT: 76 IN | SYSTOLIC BLOOD PRESSURE: 125 MMHG | HEART RATE: 62 BPM | DIASTOLIC BLOOD PRESSURE: 75 MMHG | BODY MASS INDEX: 27.64 KG/M2 | WEIGHT: 227 LBS | OXYGEN SATURATION: 98 %

## 2024-01-11 VITALS — WEIGHT: 227 LBS | HEIGHT: 76 IN | BODY MASS INDEX: 27.64 KG/M2

## 2024-01-11 DIAGNOSIS — S62.022D: Primary | ICD-10-CM

## 2024-01-11 DIAGNOSIS — I32: ICD-10-CM

## 2024-01-11 DIAGNOSIS — I48.0 PAROXYSMAL ATRIAL FIBRILLATION (MULTI): ICD-10-CM

## 2024-01-11 DIAGNOSIS — I30.1 ACUTE VIRAL PERICARDITIS (HHS-HCC): Primary | ICD-10-CM

## 2024-01-11 DIAGNOSIS — Q21.10: ICD-10-CM

## 2024-01-11 PROCEDURE — 99214 OFFICE O/P EST MOD 30 MIN: CPT | Performed by: INTERNAL MEDICINE

## 2024-01-11 PROCEDURE — 99213 OFFICE O/P EST LOW 20 MIN: CPT | Performed by: STUDENT IN AN ORGANIZED HEALTH CARE EDUCATION/TRAINING PROGRAM

## 2024-01-11 PROCEDURE — RXMED WILLOW AMBULATORY MEDICATION CHARGE

## 2024-01-11 PROCEDURE — 1036F TOBACCO NON-USER: CPT | Performed by: INTERNAL MEDICINE

## 2024-01-11 PROCEDURE — 1036F TOBACCO NON-USER: CPT | Performed by: STUDENT IN AN ORGANIZED HEALTH CARE EDUCATION/TRAINING PROGRAM

## 2024-01-11 RX ORDER — METOPROLOL SUCCINATE 25 MG/1
25 TABLET, EXTENDED RELEASE ORAL DAILY
Qty: 30 TABLET | Refills: 5 | Status: SHIPPED | OUTPATIENT
Start: 2024-01-11 | End: 2024-01-16 | Stop reason: SDUPTHER

## 2024-01-11 ASSESSMENT — PAIN - FUNCTIONAL ASSESSMENT: PAIN_FUNCTIONAL_ASSESSMENT: NO/DENIES PAIN

## 2024-01-11 ASSESSMENT — PAIN SCALES - GENERAL: PAINLEVEL: 0-NO PAIN

## 2024-01-11 NOTE — PROGRESS NOTES
PRIMARY CARE PHYSICIAN: Po Marquez DO  REFERRING PROVIDER: No referring provider defined for this encounter.     CONSULT ORTHOPAEDIC: Hand and Wrist Evaluation    ASSESSMENT & PLAN    Impression: 33 y.o. male with a left wrist nondisplaced scaphoid waist fracture.    Plan:   I explained to the patient the nature of their diagnosis.  I reviewed their imaging studies with them.    Based on the history, physical exam and imaging studies above, the patient's presentation is consistent with the above diagnosis.  I had a long discussion with the patient regarding their presentation and the treatment options.  We discussed initial nonoperative versus operative management options as well as potential further diagnostic imaging.  I reviewed the patient's x-ray findings with him.  We discussed treatment options going forward.  I recommend initial nonoperative management.  He will continue with his immobilization with the thumb spica splint.  He will wear this at all times.  No impact or lifting on the side.  I reviewed the treatment plan with him.  He expressed understanding.  He will return to see me in 2 to 3 weeks with repeat x-rays of the left wrist.    Follow-Up: Patient will follow-up 2 to 3 weeks with repeat x-rays left wrist.    At the end of the visit, all questions were answered in full. The patient is in agreement with the plan and recommendations. They will call the office with any questions/concerns.    Note dictated with [employer] software. Completed without full typed error editing and sent to avoid delay.     SUBJECTIVE  CHIEF COMPLAINT:   Chief Complaint   Patient presents with    Left Wrist - Pain        HPI: Jonah Britton is a 33 y.o. patient. Jonah Britton complains of left wrist pain. He injured his left wrist ~1 week ago while snowboarding and has been wearing a thumb spica splint since the injury. Jonah states that he does not have pain in his left wrist unless he moves it.  He  denies any issues with this wrist in the past.  X-rays performed at the local urgent care demonstrates a nondisplaced scaphoid waist fracture.    They deny any associated neck pain.  No numbness, tingling, or paresthesias.    REVIEW OF SYSTEMS  Constitutional: See HPI for pain assessment, No significant weight loss, recent trauma  Cardiovascular: No chest pain, shortness of breath  Respiratory: No difficulty breathing, cough  Gastrointestinal: No nausea, vomiting, diarrhea, constipation  Musculoskeletal: Noted in HPI, positive for pain, restricted motion, stiffness  Integumentary: No rashes, easy bruising, redness   Neurological: no numbness or tingling in extremities, no gait disturbances   Psychiatric: No mood changes, memory changes, social issues  Heme/Lymph: no excessive swelling, easy bruising, excessive bleeding  ENT: No hearing changes  Eyes: No vision changes    No past medical history on file.     No Known Allergies     Past Surgical History:   Procedure Laterality Date    CARDIAC CATHETERIZATION N/A 2023    Procedure: Pericardiocentesis;  Surgeon: Jazmyn Rich MD;  Location: [place] [address] Cardiac [facility];  Service: Cardiovascular;  Laterality: N/A;        Family History   Problem Relation Name Age of Onset    Hypothyroidism Father      Breast cancer Paternal Grandmother      Skin cancer Paternal Grandmother      Hypothyroidism Paternal Grandmother      Atrial fibrillation Paternal Grandmother      Alzheimer's disease Paternal Grandfather          Social History     Socioeconomic History    Marital status:      Spouse name: Not on file    Number of children: Not on file    Years of education: Not on file    Highest education level: Not on file   Occupational History    Not on file   Tobacco Use    Smoking status: Former     Types: Cigarettes     Start date:      Quit date: 10/1/2019     Years since quittin.2    Smokeless tobacco: Never    Tobacco comments:     Smoked about 1 pack of  cigarettes per week for about 7 years   Substance and Sexual Activity    Alcohol use: Yes     Alcohol/week: 2.0 standard drinks of alcohol     Types: 2 Standard drinks or equivalent per week    Drug use: Never    Sexual activity: Not on file   Other Topics Concern    Not on file   Social History Narrative    Not on file     Social Determinants of Health     Financial Resource Strain: Low Risk  (11/18/2023)    Overall Financial Resource Strain (CARDIA)     Difficulty of Paying Living Expenses: Not hard at all   Food Insecurity: Not on file   Transportation Needs: No Transportation Needs (11/18/2023)    PRAPARE - Transportation     Lack of Transportation (Medical): No     Lack of Transportation (Non-Medical): No   Physical Activity: Not on file   Stress: Not on file   Social Connections: Not on file   Intimate Partner Violence: Not on file   Housing Stability: Low Risk  (11/18/2023)    Housing Stability Vital Sign     Unable to Pay for Housing in the Last Year: No     Number of Places Lived in the Last Year: 1     Unstable Housing in the Last Year: No        CURRENT MEDICATIONS:   Current Outpatient Medications   Medication Sig Dispense Refill    metoprolol succinate XL (Toprol-XL) 25 mg 24 hr tablet Take 1 tablet (25 mg) by mouth once daily. Do not crush or chew. 30 tablet 5    pantoprazole (ProtoNix) 40 mg EC tablet Take 1 tablet (40 mg) by mouth once daily in the morning. Take before meals. Do not crush, chew, or split. Do not start before November 8, 2023. 90 tablet 0     No current facility-administered medications for this visit.        OBJECTIVE    PHYSICAL EXAM      11/20/2023     7:56 PM 11/21/2023    12:30 AM 11/21/2023     4:14 AM 11/21/2023     8:55 AM 11/21/2023    11:47 AM 12/19/2023    10:12 AM 1/11/2024     8:40 AM   Vitals   Systolic 126 121 130 122 109 126 125   Diastolic 75 64 74 83 70 80 75   Heart Rate 91 82 100 92 87 72 62   Temp 36.5 °C (97.7 °F) 36.2 °C (97.2 °F) 36.6 °C (97.9 °F) 36.4 °C (97.5  "°F) 35.8 °C (96.4 °F)     Resp 17 19 18 18 18     Height (in)      1.905 m (6' 3\") 1.93 m (6' 4\")   Weight (lb)      230 227   BMI      28.75 kg/m2 27.63 kg/m2   BSA (m2)      2.35 m2 2.35 m2   Visit Report      Report Report    Report      There is no height or weight on file to calculate BMI.    GENERAL: A/Ox3, NAD. Appears healthy, well nourished  PSYCHIATRIC: Mood stable, appropriate memory recall  EYES: EOM intact, no scleral icterus  CARDIOVASCULAR: Palpable peripheral pulses  LUNGS: Breathing non-labored on room air  SKIN: no erythema, rashes, or ecchymosis     MUSCULOSKELETAL:  Laterality: left Hand and Wrist Exam  - Symmetric shoulder and elbow ROM  - Skin intact  - No erythema or warmth  - No ecchymosis or soft tissue swelling  - Wrist ROM: Deferred  - Strength: Deferred  - Palpation: Positive tenderness at the snuffbox  - Able to make a loose fist    NEUROVASCULAR:  - Neurovascular Status: sensation intact to light touch distally, upper extremity motor grossly intact  - Capillary refill brisk at extremities, Bilateral peripheral pulses 2+    Imaging: Multiple views of the affected left hand/wrist(s) demonstrate: A nondisplaced scaphoid waist fracture.   X-rays were personally reviewed and interpreted by me.  Radiology reports were reviewed by me as well, if readily available at the time.      Yves Britton MD  Attending Surgeon    Sports Medicine Orthopaedic Surgery  [facility] [place]  [facility] [employer]   "

## 2024-01-11 NOTE — PROGRESS NOTES
Subjective   Jonah Britton is a 33 y.o. male pericarditis and paroxysmal atrial fibrillation.     Investigations:  Imaging:  TTE (11/18/2023) - LVEF 55%, small-moderate pericardial effusion with fibrinous material, pericardial thickening lateral to the left ventricle, non-dilated IVC with > 50% collapse.  PET/CT (11/20/2023) - hypermetabolic pericardial effusion, reduced in size compared to prior CTA, pleural effusion does not have metabolic activity, mildly hypermetabolic right supraclavicular  and subcarinal lymph nodes (possibly reactive).  TTE (10/25/2023) - LVEF 50-55%, RV systolic dysfunction, moderate-large pericardial effusion, no evidence of tamponade.  TTE (10/31/2023) - LVEF 50%, moderate-large pericardial effusion, no evidence of tamponade.   Pericardiocentesis (11/2/2023) - 340 ml drained, brown fluid.  TTE (11/2/2023) - LVEF 60-65%, post 350ml drainage, residual large pericardial loculation adjacent to the lateral wall with fibrinous stranding.   TTE (11/4/2023) - LVEF 55%, moderate pericardial effusion, no evidence of tamponade, left sided pleural effusion.   TTE (11/7/2023) - LVEF 55%, septal bounce, moderate pericardial effusion, complex/organized appearing posteriorly, no MV inflow variation, no RA/RV collapse, dilated IVC (2.4 cm) with >50% insp collapse.   CPTE (10/31/2023) - no PE, posterior bibasal airspace opacities, large pericardial effusion.   CMR (11/6/2023) - LVEF 55%, normal myocardial T2 values (no evidence of myocarditis), no significant myocardial LGE, thickened pericardium with elevated STIR signal and LGE suggestive of pericarditis, moderate-large pericardial perfusion with fibrinous material, mild respirophasic septal flattening suggestive of possible constrictive physiology. No RV/RA collapse. Small bilateral pleural effusions.     HPI  Re-admitted to hospital in November with fever and nightsweats.   Underwent extensive testing including at PET-CT, which showed pericardial  "activity and mild activity in a subcarinal/supraclavicular node (thought to be reactive) overall consistent with pericarditis.   Maintained on colchicine and ibuprofen.   Subsequent resolution of fever/symptomatic improvement.   Repeat TTE 11/18/2023 - normal LV systolic function, reduction in size of pericardial effusion, non-dilated IVC.  CRP 12/7/2023 - 2.64.    Fractured wrist (scaphoid) snowboarding - 6 days ago.   No fevers or nightsweats.  Still taking colchicine and ibuprofen.   No side effects from the medications.   Exercising daily.     ROS  A 10-system review was performed and was unremarkable apart from what is presented in the HPI.     Objective   Physical Exam  Alert and orientated.   Appropriate responses, normal affect.  No respiratory distress at rest.   Skin warm and dry.   Normal radial pulse character and volume. Clinically SR.   Anicteric sclera, no conjunctival pallor.   No JVD or carotid bruits.  Heart sounds dual, no added heart sounds or audible murmurs. No rub.   Chest clear on auscultation.   Calves soft, non-tender.  No pedal edema.      Lab Review:   Lab Results   Component Value Date     11/21/2023    K 4.3 11/21/2023     11/21/2023    CO2 25 11/21/2023    BUN 12 11/21/2023    CREATININE 0.74 11/21/2023    GLUCOSE 91 11/21/2023    CALCIUM 9.6 11/21/2023     No results found for: \"CKTOTAL\", \"CKMB\", \"CKMBINDEX\", \"TROPONINI\"  Lab Results   Component Value Date    WBC 8.1 11/21/2023    HGB 11.5 (L) 11/21/2023    HCT 36.5 (L) 11/21/2023    MCV 86 11/21/2023     11/21/2023       Assessment/Plan   In summary, Jonah Britton is a 33 y.o. male presenting for cardiology follow-up of pericarditis and paroxysmal atrial fibrillation. He was admitted shortly after his last clinic review due to persistent fevers. At that point he was further investigated with a FDG-PET/CT that showed some pericardial activity, likely consistent with pericarditis, and a subsequent TTE showed normal " LVEF with a reduction in pericardial effusion size and no evidence of constriction. He has continued colchicine/ibuprofen and pleasingly he is currently asymptomatic and his fevers/nightsweats have resolved. He has returned to regular activities including exercise but unfortunately fractured his scaphoid whilst snowboarding a few days ago. On examination, his heart sounds were dual with no audible rub and his chest was clear. He should continue combination therapy with colchicine/ibuprofen until the end of the month (3 months total) and then colchicine monotherapy. He may reduce his metoprolol succinate dose to 25 mg daily. I have arranged for repeat inflammatory markers and will follow-up with Jonah at the end of February.

## 2024-01-11 NOTE — PATIENT INSTRUCTIONS
You are progressing well from a cardiac viewpoint. Your cardiovascular examination today was within normal limits.     Continue colchicine and ibuprofen at current doses.     Repeat lab tests (CRP, RFP) at the end of January. If the lab work is satisfactory we will stop ibuprofen and continue colchicine at that point.     You may reduce your metoprolol dose to 25 mg daily.     You have no exercise restrictions from a cardiac viewpoint at present.    I will follow-up with you over the phone at the end of Jan and in clinic at the end of Feb.

## 2024-01-12 ENCOUNTER — HOSPITAL ENCOUNTER (OUTPATIENT)
Dept: RADIOLOGY | Facility: HOSPITAL | Age: 34
Discharge: HOME | End: 2024-01-12
Payer: COMMERCIAL

## 2024-01-12 DIAGNOSIS — S62.022D: ICD-10-CM

## 2024-01-12 PROCEDURE — 73200 CT UPPER EXTREMITY W/O DYE: CPT | Mod: LT

## 2024-01-12 PROCEDURE — 73200 CT UPPER EXTREMITY W/O DYE: CPT | Mod: LEFT SIDE | Performed by: RADIOLOGY

## 2024-01-15 ENCOUNTER — PHARMACY VISIT (OUTPATIENT)
Dept: PHARMACY | Facility: CLINIC | Age: 34
End: 2024-01-15
Payer: COMMERCIAL

## 2024-01-15 DIAGNOSIS — I30.1 ACUTE VIRAL PERICARDITIS (HHS-HCC): Primary | ICD-10-CM

## 2024-01-15 RX ORDER — IBUPROFEN 600 MG/1
600 TABLET ORAL 2 TIMES DAILY
Status: DISCONTINUED | OUTPATIENT
Start: 2024-01-15 | End: 2024-01-15

## 2024-01-15 RX ORDER — COLCHICINE 0.6 MG/1
0.6 TABLET ORAL 2 TIMES DAILY
Status: DISCONTINUED | OUTPATIENT
Start: 2024-01-15 | End: 2024-01-15

## 2024-01-16 ENCOUNTER — TREATMENT (OUTPATIENT)
Dept: OCCUPATIONAL THERAPY | Facility: CLINIC | Age: 34
End: 2024-01-16
Payer: COMMERCIAL

## 2024-01-16 ENCOUNTER — OFFICE VISIT (OUTPATIENT)
Dept: ORTHOPEDIC SURGERY | Facility: CLINIC | Age: 34
End: 2024-01-16
Payer: COMMERCIAL

## 2024-01-16 DIAGNOSIS — S62.012A DISPLACED FRACTURE OF DISTAL POLE OF NAVICULAR (SCAPHOID) BONE OF LEFT WRIST, INITIAL ENCOUNTER FOR CLOSED FRACTURE: ICD-10-CM

## 2024-01-16 DIAGNOSIS — I30.1 ACUTE VIRAL PERICARDITIS (HHS-HCC): ICD-10-CM

## 2024-01-16 DIAGNOSIS — I49.3 VENTRICULAR ECTOPIC BEATS: Primary | ICD-10-CM

## 2024-01-16 DIAGNOSIS — I48.0 PAROXYSMAL ATRIAL FIBRILLATION (MULTI): ICD-10-CM

## 2024-01-16 DIAGNOSIS — S62.012A: Primary | ICD-10-CM

## 2024-01-16 DIAGNOSIS — S62.012A DISPLACED FRACTURE OF DISTAL POLE OF NAVICULAR (SCAPHOID) BONE OF LEFT WRIST, INITIAL ENCOUNTER FOR CLOSED FRACTURE: Primary | ICD-10-CM

## 2024-01-16 PROCEDURE — 1036F TOBACCO NON-USER: CPT | Performed by: ORTHOPAEDIC SURGERY

## 2024-01-16 PROCEDURE — 99203 OFFICE O/P NEW LOW 30 MIN: CPT | Performed by: ORTHOPAEDIC SURGERY

## 2024-01-16 PROCEDURE — L3906 WHO W/O JOINTS CF: HCPCS | Performed by: OCCUPATIONAL THERAPIST

## 2024-01-16 PROCEDURE — 99213 OFFICE O/P EST LOW 20 MIN: CPT | Performed by: ORTHOPAEDIC SURGERY

## 2024-01-16 RX ORDER — IBUPROFEN 600 MG/1
600 TABLET ORAL 2 TIMES DAILY
Qty: 28 TABLET | Refills: 0 | Status: SHIPPED | OUTPATIENT
Start: 2024-01-16 | End: 2024-01-31

## 2024-01-16 RX ORDER — COLCHICINE 0.6 MG/1
TABLET ORAL DAILY
COMMUNITY
End: 2024-01-16 | Stop reason: SDUPTHER

## 2024-01-16 RX ORDER — METOPROLOL SUCCINATE 25 MG/1
50 TABLET, EXTENDED RELEASE ORAL DAILY
Qty: 30 TABLET | Refills: 5 | Status: SHIPPED | OUTPATIENT
Start: 2024-01-16 | End: 2024-01-18

## 2024-01-16 RX ORDER — COLCHICINE 0.6 MG/1
0.6 TABLET ORAL 2 TIMES DAILY
Qty: 60 TABLET | Refills: 1 | Status: SHIPPED | OUTPATIENT
Start: 2024-01-16 | End: 2024-03-18 | Stop reason: SDUPTHER

## 2024-01-16 ASSESSMENT — ENCOUNTER SYMPTOMS
OCCASIONAL FEELINGS OF UNSTEADINESS: 0
FEVER: 0
SHORTNESS OF BREATH: 0
WHEEZING: 0
FATIGUE: 0
CHILLS: 0
DEPRESSION: 0
TROUBLE SWALLOWING: 0
LOSS OF SENSATION IN FEET: 0
JOINT SWELLING: 1

## 2024-01-16 ASSESSMENT — PAIN - FUNCTIONAL ASSESSMENT: PAIN_FUNCTIONAL_ASSESSMENT: 0-10

## 2024-01-16 ASSESSMENT — PAIN SCALES - GENERAL: PAINLEVEL_OUTOF10: 0 - NO PAIN

## 2024-01-16 NOTE — PROGRESS NOTES
Reason for Appointment  Chief Complaint   Patient presents with    Left Wrist - Pain     History of Present Illness  New patient is a 33 y.o. male here today for evaluation of his left wrist after he fell snowboarding 2 weeks ago.  CT scan of the left wrist shows an incomplete but comminuted and mildly displaced scaphoid waist fracture.  He is very active and enjoys snowboarding, he is going on a ski trip next week.  Isolated pain over the scaphoid region and he only has pain when using the wrist.  He is has a simple thumb spica splint on today. He has a recent history of pericarditis, he has been cleared from a cardiology standpoint to proceed with any surgery if needed.    History reviewed. No pertinent past medical history.    Past Surgical History:   Procedure Laterality Date    CARDIAC CATHETERIZATION N/A 11/2/2023    Procedure: Pericardiocentesis;  Surgeon: Jazmyn Rich MD;  Location: [place] [address] [facility];  Service: Cardiovascular;  Laterality: N/A;       Medication Documentation Review Audit       Reviewed by Milagros Marie PA-C (Physician Assistant) on 01/16/24 at 1322      Medication Order Taking? Sig Documenting Provider Last Dose Status   colchicine 0.6 mg tablet [id number] Yes Take by mouth once daily. Historical Provider, MD Taking Active   ibuprofen 600 mg tablet [id number] Yes Take 1 tablet (600 mg) by mouth 2 times a day for 14 days. Israel Saenz MD Taking Active   metoprolol succinate XL (Toprol-XL) 25 mg 24 hr tablet [id number] Yes Take 1 tablet (25 mg) by mouth once daily. Do not crush or chew. Israel Saenz MD Taking Active     Discontinued 01/11/24 0927   pantoprazole (ProtoNix) 40 mg EC tablet [id number] Yes Take 1 tablet (40 mg) by mouth once daily in the morning. Take before meals. Do not crush, chew, or split. Do not start before November 8, 2023. Po Marquez, DO Taking Active                    No Known Allergies    Review of Systems   Constitutional:  Negative for chills,  fatigue and fever.   HENT:  Negative for postnasal drip and trouble swallowing.    Respiratory:  Negative for shortness of breath and wheezing.    Cardiovascular:  Negative for chest pain and leg swelling.   Musculoskeletal:  Positive for joint swelling.   Skin:  Negative for pallor and rash.     Exam   On exam patient is alert, awake, and in no acute distress. No JVD no auditory wheezes.  He has good shoulder and elbow motion.  Some mild swelling and bruising in the left wrist.  Tender over the left scaphoid, pain with flexion extension.  Good digital motion otherwise with no triggering.  Good pulses and sensation in the upper extremity.  Skin is warm and dry without ulcerations, no other swelling or lymphadenopathy.    Assessment   Encounter Diagnosis   Name Primary?    Displaced fracture of distal pole of navicular (scaphoid) bone of left wrist, initial encounter for closed fracture Yes       Plan   We discussed conservative versus operative treatment today.  With his activity level operative fixation would give him the best chance for long-term function and healing but he does understand there are still increased risk of nonunion with a scaphoid fracture even with surgery.  He is going on a ski trip next week, we will get him into a custom heavy clamshell thumb spica brace to protect the area and allow him to go on his trip.  We will see him back in 2 weeks with a repeat x-ray of the left wrist and decide on operative intervention at that time.    Written by Milagros Marie PA-C

## 2024-01-16 NOTE — PROGRESS NOTES
Rehab Walk-in Note    Patient Name: Jonah Britton  MRN: [id number]  Today's Date: 1/16/2024    Referring Physician: Leslie    Subjective   Current Problem: displace fracture of distal pole of navicular/scaphoid bone of L wrist   History of Current Problem: Patient reports falling while snowboarding on 1/5/24.       Objective   General Visit Information: Patient is pleasant and cooperative.      Clinical Presentation: Mild edema is noted in wrist  Splint Type: R FA based thumb spice with IP free with volar and dorsal plates.   Treatment order: R FA based thumb spica/clamb shell splint   Precautions:   Splint use during activity.     Pain:no pain was voiced     Home Living:Patient lives with his wife.     Prior Level of Function:Patient is employed as an owner of a [employer] and was independent prior to injury.        Assessment/Plan   Therapist fabricated/fitted R FA based thumb spica/clamb shell  splint for patient's R thumb/wrist .  Patient instructed patient in proper don/doff tech, wear recommendations, importance of skin monitoring, and splint care; written instruction provided.   Patient to wear splint during activity as instructed patient physician/therapist.  Plan: Discharge with follow-up as needed.

## 2024-01-17 ENCOUNTER — PHARMACY VISIT (OUTPATIENT)
Dept: PHARMACY | Facility: CLINIC | Age: 34
End: 2024-01-17
Payer: COMMERCIAL

## 2024-01-17 ENCOUNTER — LAB (OUTPATIENT)
Dept: LAB | Facility: LAB | Age: 34
End: 2024-01-17
Payer: COMMERCIAL

## 2024-01-17 ENCOUNTER — PATIENT MESSAGE (OUTPATIENT)
Dept: CARDIOLOGY | Facility: HOSPITAL | Age: 34
End: 2024-01-17
Payer: COMMERCIAL

## 2024-01-17 DIAGNOSIS — I30.1 ACUTE VIRAL PERICARDITIS (HHS-HCC): ICD-10-CM

## 2024-01-17 DIAGNOSIS — I49.3 VENTRICULAR ECTOPIC BEATS: ICD-10-CM

## 2024-01-17 LAB
ALBUMIN SERPL BCP-MCNC: 5 G/DL (ref 3.4–5)
ALP SERPL-CCNC: 82 U/L (ref 33–120)
ALT SERPL W P-5'-P-CCNC: 30 U/L (ref 10–52)
ANION GAP SERPL CALC-SCNC: 13 MMOL/L (ref 10–20)
AST SERPL W P-5'-P-CCNC: 22 U/L (ref 9–39)
BILIRUB SERPL-MCNC: 0.4 MG/DL (ref 0–1.2)
BUN SERPL-MCNC: 17 MG/DL (ref 6–23)
CALCIUM SERPL-MCNC: 9.9 MG/DL (ref 8.6–10.3)
CHLORIDE SERPL-SCNC: 102 MMOL/L (ref 98–107)
CO2 SERPL-SCNC: 26 MMOL/L (ref 21–32)
CREAT SERPL-MCNC: 0.94 MG/DL (ref 0.5–1.3)
CRP SERPL-MCNC: 0.16 MG/DL
EGFRCR SERPLBLD CKD-EPI 2021: >90 ML/MIN/1.73M*2
GLUCOSE SERPL-MCNC: 84 MG/DL (ref 74–99)
MAGNESIUM SERPL-MCNC: 2.16 MG/DL (ref 1.6–2.4)
PHOSPHATE SERPL-MCNC: 4.7 MG/DL (ref 2.5–4.9)
POTASSIUM SERPL-SCNC: 4.3 MMOL/L (ref 3.5–5.3)
PROT SERPL-MCNC: 7.6 G/DL (ref 6.4–8.2)
SODIUM SERPL-SCNC: 137 MMOL/L (ref 136–145)

## 2024-01-17 PROCEDURE — 80053 COMPREHEN METABOLIC PANEL: CPT

## 2024-01-17 PROCEDURE — 84100 ASSAY OF PHOSPHORUS: CPT

## 2024-01-17 PROCEDURE — 86140 C-REACTIVE PROTEIN: CPT

## 2024-01-17 PROCEDURE — 36415 COLL VENOUS BLD VENIPUNCTURE: CPT

## 2024-01-17 PROCEDURE — 83735 ASSAY OF MAGNESIUM: CPT

## 2024-01-17 PROCEDURE — RXMED WILLOW AMBULATORY MEDICATION CHARGE

## 2024-01-17 NOTE — PROGRESS NOTES
Occasional VEBs detected on wearable device following reduction of metoprolol succinate to 25mg daily, will go back to the 50mg daily dose.    For labs - CMP, Mg, inflammatory markers.     Due to have orthopedic surgery for management of scaphoid fracture next week. Repeat TTE ordered.

## 2024-01-18 ENCOUNTER — OFFICE VISIT (OUTPATIENT)
Dept: PRIMARY CARE | Facility: CLINIC | Age: 34
End: 2024-01-18
Payer: COMMERCIAL

## 2024-01-18 ENCOUNTER — HOSPITAL ENCOUNTER (OUTPATIENT)
Dept: CARDIOLOGY | Facility: CLINIC | Age: 34
Discharge: HOME | End: 2024-01-18
Payer: COMMERCIAL

## 2024-01-18 ENCOUNTER — PHARMACY VISIT (OUTPATIENT)
Dept: PHARMACY | Facility: CLINIC | Age: 34
End: 2024-01-18
Payer: COMMERCIAL

## 2024-01-18 ENCOUNTER — TELEPHONE (OUTPATIENT)
Dept: CARDIOLOGY | Facility: CLINIC | Age: 34
End: 2024-01-18

## 2024-01-18 VITALS
BODY MASS INDEX: 27.87 KG/M2 | DIASTOLIC BLOOD PRESSURE: 80 MMHG | SYSTOLIC BLOOD PRESSURE: 138 MMHG | HEART RATE: 62 BPM | WEIGHT: 229 LBS

## 2024-01-18 DIAGNOSIS — R00.2 PALPITATIONS: Primary | ICD-10-CM

## 2024-01-18 DIAGNOSIS — R00.2 PALPITATIONS: ICD-10-CM

## 2024-01-18 PROBLEM — R00.0 TACHYCARDIA: Status: ACTIVE | Noted: 2023-11-17

## 2024-01-18 PROBLEM — I25.9 MYOCARDIAL ISCHEMIA: Status: ACTIVE | Noted: 2024-01-18

## 2024-01-18 PROBLEM — M54.50 LOW BACK PAIN: Status: ACTIVE | Noted: 2024-01-18

## 2024-01-18 PROBLEM — R05.9 COUGH, UNSPECIFIED: Status: ACTIVE | Noted: 2023-10-19

## 2024-01-18 PROCEDURE — 1036F TOBACCO NON-USER: CPT | Performed by: FAMILY MEDICINE

## 2024-01-18 PROCEDURE — 93248 EXT ECG>7D<15D REV&INTERPJ: CPT | Performed by: INTERNAL MEDICINE

## 2024-01-18 PROCEDURE — 93000 ELECTROCARDIOGRAM COMPLETE: CPT | Performed by: FAMILY MEDICINE

## 2024-01-18 PROCEDURE — 93246 EXT ECG>7D<15D RECORDING: CPT

## 2024-01-18 PROCEDURE — 99214 OFFICE O/P EST MOD 30 MIN: CPT | Performed by: FAMILY MEDICINE

## 2024-01-18 PROCEDURE — RXMED WILLOW AMBULATORY MEDICATION CHARGE

## 2024-01-18 RX ORDER — METOPROLOL TARTRATE 25 MG/1
25 TABLET, FILM COATED ORAL 2 TIMES DAILY
Qty: 180 TABLET | Refills: 1 | Status: SHIPPED | OUTPATIENT
Start: 2024-01-18 | End: 2024-04-30 | Stop reason: WASHOUT

## 2024-01-18 ASSESSMENT — PATIENT HEALTH QUESTIONNAIRE - PHQ9
SUM OF ALL RESPONSES TO PHQ9 QUESTIONS 1 AND 2: 0
1. LITTLE INTEREST OR PLEASURE IN DOING THINGS: NOT AT ALL
2. FEELING DOWN, DEPRESSED OR HOPELESS: NOT AT ALL

## 2024-01-18 NOTE — PROGRESS NOTES
Subjective   Patient ID: Jonah Britton is a 33 y.o. male who presents for Palpitations (Since Saturday feels more when at rest).    Past Medical, Surgical, and Family History reviewed and updated in chart.    Reviewed all medications by prescribing practitioner or clinical pharmacist (such as prescriptions, OTCs, herbal therapies and supplements) and documented in the medical record.    HPI  Jonah consulted with his cardiologist on January 11, and it was decided to reduce his Toprol-XL dosage from 50 mg daily to 25 mg daily. He commenced the new dosage on January 12, and began experiencing palpitations the following day. His Apple iWatch has detected premature ventricular contractions (PVCs), but not atrial fibrillation, with a frequency of every 6 to 10 seconds. Earlier this week, Jonah reached out to his cardiologist who suggested he return to his original dose of 50 mg of metoprolol daily. Despite this adjustment, he reports no significant change in his palpitations.    Jonah consumes one cup of caffeinated coffee every morning, has abstained from alcohol since New Year's, and notes that he doesn't perceive the palpitations when he is physically active. He becomes more aware of them during periods of rest. Importantly, he has not experienced any chest pain, shortness of breath, or any other cardiac or respiratory issues.    During Jonah's last visit, we discussed arranging an appointment with Dr. Birch, who is based closer to his residence and from whom he wishes to seek an additional cardiology opinion. Dr. Birch has agreed to see him, and Jonah plans to schedule an appointment.    Moreover, Jonah is open to the idea of wearing a cardiac event monitor to determine whether his symptoms are indeed due to PVCs, or if they may be indicative of a cardiac arrhythmia.    Review of Systems  All pertinent positive symptoms are included in the history of present illness.    All other systems have been reviewed and are  negative and noncontributory to this patient's current ailments.    History reviewed. No pertinent past medical history.  Past Surgical History:   Procedure Laterality Date    CARDIAC CATHETERIZATION N/A 2023    Procedure: Pericardiocentesis;  Surgeon: Jazmyn Rich MD;  Location: [place] [address] [facility];  Service: Cardiovascular;  Laterality: N/A;     Social History     Tobacco Use    Smoking status: Former     Types: Cigarettes     Start date:      Quit date: 10/1/2019     Years since quittin.3    Smokeless tobacco: Never    Tobacco comments:     Smoked about 1 pack of cigarettes per week for about 7 years   Vaping Use    Vaping Use: Never used   Substance Use Topics    Alcohol use: Yes     Alcohol/week: 2.0 standard drinks of alcohol     Types: 2 Standard drinks or equivalent per week    Drug use: Never     Family History   Problem Relation Name Age of Onset    Hypothyroidism Father      Breast cancer Paternal Grandmother      Skin cancer Paternal Grandmother      Hypothyroidism Paternal Grandmother      Atrial fibrillation Paternal Grandmother      Alzheimer's disease Paternal Grandfather       Immunization History   Administered Date(s) Administered    DTP 1991, 1992, 1992    DTaP, Unspecified 1993, 1996    Flu vaccine (IIV4), preservative free *Check age/dose* 2019    Hepatitis B vaccine, pediatric/adolescent (RECOMBIVAX, ENGERIX) 08/10/1999, 1999, 2000    HiB PRP-T conjugate vaccine (HIBERIX, ACTHIB) 1991, 1992, 1992, 1993    MMR vaccine, subcutaneous (MMR II) 1993, 08/10/1999    Meningococcal MCV4P 2010    OPV 1991, 1992, 1993, 1996    Pfizer Purple Cap SARS-CoV-2 10/06/2021, 10/28/2021    Td (adult), unspecified 2004    Tdap vaccine, age 7 year and older (BOOSTRIX) 2010, 2019     Current Outpatient Medications   Medication Instructions    colchicine 0.6 mg,  oral, 2 times daily    ibuprofen 600 mg, oral, 2 times daily    metoprolol tartrate (LOPRESSOR) 25 mg, oral, 2 times daily    pantoprazole (ProtoNix) 40 mg EC tablet Take 1 tablet (40 mg) by mouth once daily in the morning. Take before meals. Do not crush, chew, or split. Do not start before November 8, 2023.     No Known Allergies    Objective   Vitals:    01/18/24 1121   BP: 138/80   Pulse: 62   Weight: 104 kg (229 lb)     Body mass index is 27.87 kg/m².    BP Readings from Last 3 Encounters:   01/18/24 138/80   01/11/24 125/75   12/19/23 126/80      Wt Readings from Last 3 Encounters:   01/18/24 104 kg (229 lb)   01/11/24 103 kg (227 lb)   01/11/24 103 kg (227 lb)        Lab on 01/17/2024   Component Date Value    C-Reactive Protein 01/17/2024 0.16     Glucose 01/17/2024 84     Sodium 01/17/2024 137     Potassium 01/17/2024 4.3     Chloride 01/17/2024 102     Bicarbonate 01/17/2024 26     Anion Gap 01/17/2024 13     Urea Nitrogen 01/17/2024 17     Creatinine 01/17/2024 0.94     eGFR 01/17/2024 >90     Calcium 01/17/2024 9.9     Albumin 01/17/2024 5.0     Alkaline Phosphatase 01/17/2024 82     Total Protein 01/17/2024 7.6     AST 01/17/2024 22     Bilirubin, Total 01/17/2024 0.4     ALT 01/17/2024 30     Magnesium 01/17/2024 2.16     Phosphorus 01/17/2024 4.7      Physical Exam  CONSTITUTIONAL - well nourished, well developed, looks like stated age, in no acute distress, not ill-appearing, and not tired appearing  SKIN - normal skin color and pigmentation, normal skin turgor without rash, lesions, or nodules visualized  HEAD - no trauma, normocephalic  EYES - pupils are equal and reactive to light, extraocular muscles are intact, and normal external exam  CHEST - clear to auscultation, no wheezing, no crackles and no rales, good effort  CARDIAC - regular rate and regular rhythm, multiple ectopic beats, noticed every 5 seconds or so, no murmur  EXTREMITIES - no obvious or evident edema, no obvious or evident  deformities  NEUROLOGICAL - normal gait, normal balance, normal motor, no ataxia, alert, oriented and no focal signs  PSYCHIATRIC - alert, pleasant and cordial, age-appropriate    Assessment/Plan   Problem List Items Addressed This Visit       Palpitations - Primary     EKG reveals sinus rhythm with occasional ectopic beat  PVCs are clearly the reason you are feeling the skipped beats  We decided to have you have a cardiac event monitor placed today  Requisition was sent to Dr. Birch, and they will place it on you today    We will discontinue metoprolol ER 50 mg daily in favor of metoprolol tartrate 25 mg twice daily  The thought behind this is to see if we can get better coverage and more effectiveness for you throughout the day and night  Please discontinue all caffeinated beverages including your caffeinated coffee, try to avoid alcohol, and make follow-up with Dr. Birch per our conversation         Relevant Medications    metoprolol tartrate (Lopressor) 25 mg tablet    Other Relevant Orders    ECG 12 lead (Clinic Performed) (Completed)    Holter or Event Cardiac Monitor

## 2024-01-18 NOTE — ASSESSMENT & PLAN NOTE
EKG reveals sinus rhythm with occasional ectopic beat  PVCs are clearly the reason you are feeling the skipped beats  We decided to have you have a cardiac event monitor placed today  Requisition was sent to Dr. Birch, and they will place it on you today    We will discontinue metoprolol ER 50 mg daily in favor of metoprolol tartrate 25 mg twice daily  The thought behind this is to see if we can get better coverage and more effectiveness for you throughout the day and night  Please discontinue all caffeinated beverages including your caffeinated coffee, try to avoid alcohol, and make follow-up with Dr. Birch per our conversation

## 2024-01-22 ENCOUNTER — HOSPITAL ENCOUNTER (OUTPATIENT)
Dept: CARDIOLOGY | Facility: HOSPITAL | Age: 34
Discharge: HOME | End: 2024-01-22
Payer: COMMERCIAL

## 2024-01-22 DIAGNOSIS — I30.0 ACUTE IDIOPATHIC PERICARDITIS (HHS-HCC): ICD-10-CM

## 2024-01-22 PROCEDURE — 93356 MYOCRD STRAIN IMG SPCKL TRCK: CPT

## 2024-01-22 PROCEDURE — 93356 MYOCRD STRAIN IMG SPCKL TRCK: CPT | Performed by: INTERNAL MEDICINE

## 2024-01-22 PROCEDURE — 93306 TTE W/DOPPLER COMPLETE: CPT | Performed by: INTERNAL MEDICINE

## 2024-01-23 LAB
AORTIC VALVE MEAN GRADIENT: 2.6 MMHG
AORTIC VALVE PEAK VELOCITY: 1.11 M/S
AV PEAK GRADIENT: 4.9 MMHG
AVA (PEAK VEL): 2.85 CM2
AVA (VTI): 3.15 CM2
EJECTION FRACTION APICAL 4 CHAMBER: 51.7
EJECTION FRACTION: 51 %
GLOBAL LONGITUDINAL STRAIN: 16.7 %
LEFT ATRIUM VOLUME AREA LENGTH INDEX BSA: 20.6 ML/M2
LEFT VENTRICLE INTERNAL DIMENSION DIASTOLE: 5.15 CM (ref 3.5–6)
LEFT VENTRICULAR OUTFLOW TRACT DIAMETER: 2.02 CM
MITRAL VALVE E/A RATIO: 1.39
MITRAL VALVE E/E' RATIO: 5.24
RIGHT VENTRICLE FREE WALL PEAK S': 11.71 CM/S
TRICUSPID ANNULAR PLANE SYSTOLIC EXCURSION: 2.3 CM

## 2024-01-30 ENCOUNTER — APPOINTMENT (OUTPATIENT)
Dept: PRIMARY CARE | Facility: CLINIC | Age: 34
End: 2024-01-30
Payer: COMMERCIAL

## 2024-02-01 ENCOUNTER — HOSPITAL ENCOUNTER (OUTPATIENT)
Dept: RADIOLOGY | Facility: HOSPITAL | Age: 34
Discharge: HOME | End: 2024-02-01
Payer: COMMERCIAL

## 2024-02-01 ENCOUNTER — OFFICE VISIT (OUTPATIENT)
Dept: ORTHOPEDIC SURGERY | Facility: HOSPITAL | Age: 34
End: 2024-02-01
Payer: COMMERCIAL

## 2024-02-01 DIAGNOSIS — S62.022D: ICD-10-CM

## 2024-02-01 DIAGNOSIS — S63.8X2A LEFT SCAPHOLUNATE LIGAMENT TEAR, INITIAL ENCOUNTER: Primary | ICD-10-CM

## 2024-02-01 PROCEDURE — 1036F TOBACCO NON-USER: CPT | Performed by: ORTHOPAEDIC SURGERY

## 2024-02-01 PROCEDURE — 99213 OFFICE O/P EST LOW 20 MIN: CPT | Performed by: ORTHOPAEDIC SURGERY

## 2024-02-01 PROCEDURE — 73110 X-RAY EXAM OF WRIST: CPT | Mod: LT

## 2024-02-01 ASSESSMENT — PAIN SCALES - GENERAL: PAINLEVEL_OUTOF10: 0 - NO PAIN

## 2024-02-01 ASSESSMENT — ENCOUNTER SYMPTOMS
WOUND: 0
FEVER: 0
SINUS PAIN: 0
SHORTNESS OF BREATH: 0
WHEEZING: 0
TROUBLE SWALLOWING: 0
CHILLS: 0
FATIGUE: 0

## 2024-02-01 ASSESSMENT — PAIN - FUNCTIONAL ASSESSMENT: PAIN_FUNCTIONAL_ASSESSMENT: 0-10

## 2024-02-01 NOTE — PROGRESS NOTES
Reason for Appointment  Mild L wrist pain    History of Present Illness  Patient is a 33 y.o. male here today for follow-up evaluation of his left wrist almost 4 weeks out status post a left scaphoid fracture.  X-rays taken today are reviewed and do not show any significant shift in the fracture, he still has some comminution over the more distal scaphoid waist.  He has been wearing a custom splint full-time and has been snowboarding with no issues.  Previous CT scan did show some mild intercarpal subluxation and an MRI was recommended.  No other changes in his past medical history, allergies, or medications.    History reviewed. No pertinent past medical history.    Past Surgical History:   Procedure Laterality Date    CARDIAC CATHETERIZATION N/A 2023    Procedure: Pericardiocentesis;  Surgeon: Jazmyn Rich MD;  Location: [place] [address] [facility];  Service: Cardiovascular;  Laterality: N/A;       Medication Documentation Review Audit       Reviewed by Milagros Marie PA-C (Physician Assistant) on 24 at 1540      Medication Order Taking? Sig Documenting Provider Last Dose Status   colchicine 0.6 mg tablet [id number] Yes Take 1 tablet (0.6 mg) by mouth 2 times a day. Israel Saenz MD Taking Active   ibuprofen 600 mg tablet [id number]  Take 1 tablet (600 mg) by mouth 2 times a day for 14 days. Israel Saenz MD   24 2359   metoprolol tartrate (Lopressor) 25 mg tablet [id number] Yes Take 1 tablet (25 mg) by mouth 2 times a day. Po Marquez,  Taking Active   pantoprazole (ProtoNix) 40 mg EC tablet [id number] Yes Take 1 tablet (40 mg) by mouth once daily in the morning. Take before meals. Do not crush, chew, or split. Do not start before 2023. Po Marquez, DO Taking Active                    No Known Allergies    Review of Systems   Constitutional:  Negative for chills, fatigue and fever.   HENT:  Negative for sinus pain and trouble swallowing.    Respiratory:  Negative  for shortness of breath and wheezing.    Cardiovascular:  Negative for chest pain and leg swelling.   Skin:  Negative for rash and wound.     Exam   On exam left wrist shows improved swelling and bruising today, tenderness over the left scaphoid and pain with motion.  No gross instability of the wrist.  Good digital motion with no triggering.  Good pulses and sensation in the upper extremity    Assessment   Encounter Diagnoses   Name Primary?    Closed transverse fracture of waist of scaphoid with routine healing, left     Left scapholunate ligament tear, initial encounter Yes       Plan   X-rays show good healing with no displacement but to make a definitive decision on operative treatment as well as MRI being recommended for mild intercarpal subluxation, we will get an MRI of the left wrist to evaluate for healing of the scaphoid fracture as well as any ligamentous injury.  Follow-up with us after the MRI    Written by Milagros Olmos saw, evaluated, and treated the patient with the PA

## 2024-02-07 ENCOUNTER — TELEPHONE (OUTPATIENT)
Dept: CARDIOLOGY | Facility: CLINIC | Age: 34
End: 2024-02-07
Payer: COMMERCIAL

## 2024-02-13 ENCOUNTER — APPOINTMENT (OUTPATIENT)
Dept: CARDIOLOGY | Facility: CLINIC | Age: 34
End: 2024-02-13
Payer: COMMERCIAL

## 2024-02-14 PROCEDURE — RXMED WILLOW AMBULATORY MEDICATION CHARGE

## 2024-02-15 NOTE — RESULT ENCOUNTER NOTE
Jonah and I discussed the cardiac event monitor last evening.  HR went as high as 193 bpm, but he was snowboarding at time the rest was relatively unremarkable with some PVCs.

## 2024-02-16 ENCOUNTER — PHARMACY VISIT (OUTPATIENT)
Dept: PHARMACY | Facility: CLINIC | Age: 34
End: 2024-02-16
Payer: COMMERCIAL

## 2024-02-22 ENCOUNTER — HOSPITAL ENCOUNTER (OUTPATIENT)
Dept: RADIOLOGY | Facility: HOSPITAL | Age: 34
Discharge: HOME | End: 2024-02-22
Payer: COMMERCIAL

## 2024-02-22 DIAGNOSIS — S62.022D: ICD-10-CM

## 2024-02-22 DIAGNOSIS — S63.8X2A LEFT SCAPHOLUNATE LIGAMENT TEAR, INITIAL ENCOUNTER: ICD-10-CM

## 2024-02-22 PROCEDURE — 73221 MRI JOINT UPR EXTREM W/O DYE: CPT | Mod: LEFT SIDE | Performed by: STUDENT IN AN ORGANIZED HEALTH CARE EDUCATION/TRAINING PROGRAM

## 2024-02-22 PROCEDURE — 73221 MRI JOINT UPR EXTREM W/O DYE: CPT | Mod: LT

## 2024-02-27 ENCOUNTER — OFFICE VISIT (OUTPATIENT)
Dept: CARDIOLOGY | Facility: HOSPITAL | Age: 34
End: 2024-02-27
Payer: COMMERCIAL

## 2024-02-27 ENCOUNTER — APPOINTMENT (OUTPATIENT)
Dept: CARDIOLOGY | Facility: HOSPITAL | Age: 34
End: 2024-02-27
Payer: COMMERCIAL

## 2024-02-27 VITALS
HEART RATE: 68 BPM | WEIGHT: 228 LBS | HEIGHT: 77 IN | BODY MASS INDEX: 26.92 KG/M2 | OXYGEN SATURATION: 98 % | SYSTOLIC BLOOD PRESSURE: 133 MMHG | DIASTOLIC BLOOD PRESSURE: 79 MMHG

## 2024-02-27 DIAGNOSIS — I30.1 ACUTE VIRAL PERICARDITIS (HHS-HCC): Primary | ICD-10-CM

## 2024-02-27 PROCEDURE — 99214 OFFICE O/P EST MOD 30 MIN: CPT | Performed by: INTERNAL MEDICINE

## 2024-02-27 PROCEDURE — 1036F TOBACCO NON-USER: CPT | Performed by: INTERNAL MEDICINE

## 2024-02-27 ASSESSMENT — ENCOUNTER SYMPTOMS
LOSS OF SENSATION IN FEET: 0
DEPRESSION: 0
OCCASIONAL FEELINGS OF UNSTEADINESS: 0

## 2024-02-27 ASSESSMENT — PAIN SCALES - GENERAL: PAINLEVEL: 0-NO PAIN

## 2024-02-27 NOTE — PATIENT INSTRUCTIONS
You are progressing well from a cardiovascular viewpoint.  Your clinical examination today was unremarkable.    As discussed, continue colchicine until the end of March and then stop the medication please    Please have blood collected for lab work 2 weeks after stopping colchicine.    You may trial cessation of metoprolol at any point however this may may best be left total after you stop colchicine therapy.    I will follow-up with you in April to assess her progress.

## 2024-02-27 NOTE — PROGRESS NOTES
Subjective   Jonah Britton is a 33 y.o. Jonah Britton is a 33 y.o. male pericarditis, paroxysmal atrial fibrillation and VEBs.     Investigations:  Imaging:  TTE (11/18/2023) - LVEF 55%, small-moderate pericardial effusion with fibrinous material, pericardial thickening lateral to the left ventricle, non-dilated IVC with > 50% collapse.  PET/CT (11/20/2023) - hypermetabolic pericardial effusion, reduced in size compared to prior CTA, pleural effusion does not have metabolic activity, mildly hypermetabolic right supraclavicular  and subcarinal lymph nodes (possibly reactive).  TTE (10/25/2023) - LVEF 50-55%, RV systolic dysfunction, moderate-large pericardial effusion, no evidence of tamponade.  TTE (10/31/2023) - LVEF 50%, moderate-large pericardial effusion, no evidence of tamponade.   Pericardiocentesis (11/2/2023) - 340 ml drained, brown fluid.  TTE (11/2/2023) - LVEF 60-65%, post 350ml drainage, residual large pericardial loculation adjacent to the lateral wall with fibrinous stranding.   TTE (11/4/2023) - LVEF 55%, moderate pericardial effusion, no evidence of tamponade, left sided pleural effusion.   TTE (11/7/2023) - LVEF 55%, septal bounce, moderate pericardial effusion, complex/organized appearing posteriorly, no MV inflow variation, no RA/RV collapse, dilated IVC (2.4 cm) with >50% insp collapse.   CPTE (10/31/2023) - no PE, posterior bibasal airspace opacities, large pericardial effusion.   CMR (11/6/2023) - LVEF 55%, normal myocardial T2 values (no evidence of myocarditis), no significant myocardial LGE, thickened pericardium with elevated STIR signal and LGE suggestive of pericarditis, moderate-large pericardial perfusion with fibrinous material, mild respirophasic septal flattening suggestive of possible constrictive physiology. No RV/RA collapse. Small bilateral pleural effusions. .    Chief Complaint:  Follow-up    HPI  Feels well  Asymptomatic  Stopped NSAID 4-weeks ago  Tolerating colchicine, no  side effects.   Changed metoprolol to tartrate 25mg bid. Had heart rate monitor with low burden of PVCs.   Only notices palpitations.     ROS  A 10-system review was performed and was unremarkable apart from what is presented in the HPI.     Objective   Physical Exam  Alert and orientated.   Appropriate responses, normal affect.  No respiratory distress at rest.   Skin warm and dry.   Normal radial pulse character and volume. Clinically SR.   Anicteric sclera, no conjunctival pallor.   No JVD or carotid bruits.  Heart sounds dual, no added heart sounds or audible murmurs. No audible rub.   Chest clear on auscultation.   No pedal edema.    Lab Review:   Lab Results   Component Value Date     01/17/2024    K 4.3 01/17/2024     01/17/2024    CO2 26 01/17/2024    BUN 17 01/17/2024    CREATININE 0.94 01/17/2024    GLUCOSE 84 01/17/2024    CALCIUM 9.9 01/17/2024     Lab Results   Component Value Date    WBC 8.1 11/21/2023    HGB 11.5 (L) 11/21/2023    HCT 36.5 (L) 11/21/2023    MCV 86 11/21/2023     11/21/2023       Assessment/Plan   In summary, Jonah Britton is a 33 y.o. Jonah Britton is a 33 y.o. male pericarditis, paroxysmal atrial fibrillation and VEBs.  He has been well since last review having stopped ibuprofen but continued colchicine.  At present he denies any specific cardiac symptoms.  Reassuringly his last CRP was within normal limits and his echocardiogram showed resolution of his pericardial effusion with no evidence of pericardial constriction.  He trialed a lower dose of metoprolol but had palpitations secondary to PVCs that were confirmed on a monitor ordered by his PCP.  He is currently taking metoprolol tartrate 25 mg twice daily.  His cardiovascular examination was within normal limits and there was no audible rub.  At this stage of advised him to continue colchicine until the end of March and then stop.  He is to have lab work including inflammatory markers 2 weeks after stopping  colchicine.  After completing his course of colchicine a trial of metoprolol cessation would be reasonable however there is no urgency in doing so.  I will follow-up with Jonah in April to assess his progress.

## 2024-03-06 ENCOUNTER — APPOINTMENT (OUTPATIENT)
Dept: CARDIOLOGY | Facility: CLINIC | Age: 34
End: 2024-03-06
Payer: COMMERCIAL

## 2024-03-13 ENCOUNTER — TELEPHONE (OUTPATIENT)
Dept: ORTHOPEDIC SURGERY | Facility: CLINIC | Age: 34
End: 2024-03-13
Payer: COMMERCIAL

## 2024-03-13 NOTE — TELEPHONE ENCOUNTER
Patient calling because he wants to avoid another office visit to go over MRI. Would rather just have a phone call to discuss. He doesn't want to come back in as he has had multiple appointments. Wants to know next step and how much longer he is supposed to wear the brace? Please advise.  Phone number is the primary in the chart and is aware providers in surgery today.

## 2024-03-18 ENCOUNTER — HOSPITAL ENCOUNTER (OUTPATIENT)
Dept: RADIOLOGY | Facility: CLINIC | Age: 34
Discharge: HOME | End: 2024-03-18
Payer: COMMERCIAL

## 2024-03-18 ENCOUNTER — OFFICE VISIT (OUTPATIENT)
Dept: ORTHOPEDIC SURGERY | Facility: CLINIC | Age: 34
End: 2024-03-18
Payer: COMMERCIAL

## 2024-03-18 DIAGNOSIS — I30.1 ACUTE VIRAL PERICARDITIS (HHS-HCC): ICD-10-CM

## 2024-03-18 DIAGNOSIS — S62.022D: ICD-10-CM

## 2024-03-18 DIAGNOSIS — S62.022D: Primary | ICD-10-CM

## 2024-03-18 PROCEDURE — 99213 OFFICE O/P EST LOW 20 MIN: CPT | Performed by: ORTHOPAEDIC SURGERY

## 2024-03-18 PROCEDURE — 1036F TOBACCO NON-USER: CPT | Performed by: ORTHOPAEDIC SURGERY

## 2024-03-18 PROCEDURE — 73110 X-RAY EXAM OF WRIST: CPT | Mod: LT

## 2024-03-18 RX ORDER — COLCHICINE 0.6 MG/1
0.6 TABLET ORAL 2 TIMES DAILY
Qty: 60 TABLET | Refills: 1 | Status: CANCELLED | OUTPATIENT
Start: 2024-03-18 | End: 2024-05-17

## 2024-03-18 ASSESSMENT — ENCOUNTER SYMPTOMS
CHILLS: 0
COLOR CHANGE: 0
WHEEZING: 0
SHORTNESS OF BREATH: 0
FEVER: 0
FATIGUE: 0
JOINT SWELLING: 0
TROUBLE SWALLOWING: 0

## 2024-03-18 ASSESSMENT — PAIN SCALES - GENERAL: PAINLEVEL_OUTOF10: 0 - NO PAIN

## 2024-03-18 ASSESSMENT — PAIN - FUNCTIONAL ASSESSMENT: PAIN_FUNCTIONAL_ASSESSMENT: 0-10

## 2024-03-18 NOTE — PROGRESS NOTES
Reason for Appointment  Chief Complaint   Patient presents with    Left Wrist - Results     MRI     History of Present Illness  Patient is a 33 y.o. male here today for follow-up evaluation of his left wrist.  He is about 10 weeks out status post a left scaphoid fracture.  X-rays taken today are reviewed and do show some callus formation over the fracture site and no widening of the scapholunate ligament interval.  MRI of the wrist is reviewed with the report and shows partial tearing of the dorsal band of the scapholunate ligament with dorsal tilting of the lunate as seen on previous CT scan and x-rays.  He is not having any significant wrist pain.  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 No other changes in his past medical history, allergies, or medications.    History reviewed. No pertinent past medical history.    Past Surgical History:   Procedure Laterality Date    CARDIAC CATHETERIZATION N/A 11/2/2023    Procedure: Pericardiocentesis;  Surgeon: Jazmyn Rich MD;  Location: [place] [address] [facility];  Service: Cardiovascular;  Laterality: N/A;       Medication Documentation Review Audit       Reviewed by Milagros Marie PA-C (Physician Assistant) on 03/18/24 at 0905      Medication Order Taking? Sig Documenting Provider Last Dose Status   colchicine 0.6 mg tablet [id number] Yes Take 1 tablet (0.6 mg) by mouth 2 times a day. Israel Saenz MD Taking Active   metoprolol tartrate (Lopressor) 25 mg tablet [id number] Yes Take 1 tablet (25 mg) by mouth 2 times a day. Po Marquez,  Taking Active   pantoprazole (ProtoNix) 40 mg EC tablet [id number] Yes Take 1 tablet (40 mg) by mouth once daily in the morning. Take before meals. Do not crush, chew, or split. Do not start before November 8, 2023. Po Marquez, DO Taking Active                    No Known Allergies    Review of Systems   Constitutional:  Negative for chills, fatigue and fever.   HENT:  Negative for postnasal drip and  trouble swallowing.    Respiratory:  Negative for shortness of breath and wheezing.    Cardiovascular:  Negative for chest pain and leg swelling.   Musculoskeletal:  Negative for joint swelling.   Skin:  Negative for color change and pallor.     Exam   On exam the left wrist shows no significant swelling.  Just some very mild tenderness over the scaphoid but no tenderness over the scapholunate ligament.  Some mild stiffness with wrist motion.  Good digital motion with no triggering.  Good pulses and sensation in the upper extremity.    Assessment   Encounter Diagnosis   Name Primary?    Closed transverse fracture of waist of scaphoid with routine healing, left Yes     Plan   We do long discussion with him today, he has a complex issue with the scaphoid fracture as well as the partial tearing of the scapholunate ligament.  He denies having any significant injury to the wrist in the past.  He does understand that with injury to the ligament, he is at a higher risk long-term regardless of operative treatment for arthritic change in the wrist.  We did discuss possible surgical intervention in terms of repairing the ligament and an ORIF of the scaphoid but this is a lengthy recovery.  He will wear the splint for another 4 weeks full-time other than hygiene we will call him later this week after discussing with Dr. Roche    Written by Milagros Olmos saw, evaluated, and treated the patient with the PA

## 2024-03-19 RX ORDER — COLCHICINE 0.6 MG/1
0.6 TABLET ORAL 2 TIMES DAILY
Qty: 30 TABLET | Refills: 0 | Status: SHIPPED | OUTPATIENT
Start: 2024-03-19 | End: 2024-04-03

## 2024-03-20 ENCOUNTER — PHARMACY VISIT (OUTPATIENT)
Dept: PHARMACY | Facility: CLINIC | Age: 34
End: 2024-03-20
Payer: COMMERCIAL

## 2024-03-20 PROCEDURE — RXMED WILLOW AMBULATORY MEDICATION CHARGE

## 2024-03-28 ENCOUNTER — APPOINTMENT (OUTPATIENT)
Dept: ORTHOPEDIC SURGERY | Facility: HOSPITAL | Age: 34
End: 2024-03-28
Payer: COMMERCIAL

## 2024-04-17 DIAGNOSIS — I30.1 ACUTE VIRAL PERICARDITIS (HHS-HCC): Primary | ICD-10-CM

## 2024-04-22 ENCOUNTER — LAB (OUTPATIENT)
Dept: LAB | Facility: LAB | Age: 34
End: 2024-04-22
Payer: COMMERCIAL

## 2024-04-22 DIAGNOSIS — I30.1 ACUTE VIRAL PERICARDITIS (HHS-HCC): ICD-10-CM

## 2024-04-22 LAB — CRP SERPL-MCNC: 0.13 MG/DL

## 2024-04-22 PROCEDURE — 86140 C-REACTIVE PROTEIN: CPT

## 2024-04-22 PROCEDURE — 36415 COLL VENOUS BLD VENIPUNCTURE: CPT

## 2024-04-29 RX ORDER — PREDNISOLONE ACETATE 10 MG/ML
SUSPENSION/ DROPS OPHTHALMIC
COMMUNITY
Start: 2024-03-27

## 2024-04-30 ENCOUNTER — OFFICE VISIT (OUTPATIENT)
Dept: CARDIOLOGY | Facility: HOSPITAL | Age: 34
End: 2024-04-30
Payer: COMMERCIAL

## 2024-04-30 VITALS
HEIGHT: 77 IN | OXYGEN SATURATION: 96 % | HEART RATE: 66 BPM | DIASTOLIC BLOOD PRESSURE: 86 MMHG | WEIGHT: 233.2 LBS | SYSTOLIC BLOOD PRESSURE: 144 MMHG | BODY MASS INDEX: 27.54 KG/M2

## 2024-04-30 DIAGNOSIS — I30.0 ACUTE IDIOPATHIC PERICARDITIS (HHS-HCC): Primary | ICD-10-CM

## 2024-04-30 PROCEDURE — 99213 OFFICE O/P EST LOW 20 MIN: CPT | Performed by: INTERNAL MEDICINE

## 2024-04-30 ASSESSMENT — PAIN SCALES - GENERAL: PAINLEVEL: 0-NO PAIN

## 2024-04-30 NOTE — PROGRESS NOTES
Subjective   Jonah Britton is a 33 y.o. male.  Expand All Collapse All       Subjective []Expand by Default  Jonah Britton is a 33 y.o. Jonah Britton is a 33 y.o. male pericarditis, paroxysmal atrial fibrillation and VEBs.     Investigations:  Imaging:  TTE (11/18/2023) - LVEF 55%, small-moderate pericardial effusion with fibrinous material, pericardial thickening lateral to the left ventricle, non-dilated IVC with > 50% collapse.  PET/CT (11/20/2023) - hypermetabolic pericardial effusion, reduced in size compared to prior CTA, pleural effusion does not have metabolic activity, mildly hypermetabolic right supraclavicular  and subcarinal lymph nodes (possibly reactive).  TTE (10/25/2023) - LVEF 50-55%, RV systolic dysfunction, moderate-large pericardial effusion, no evidence of tamponade.  TTE (10/31/2023) - LVEF 50%, moderate-large pericardial effusion, no evidence of tamponade.   Pericardiocentesis (11/2/2023) - 340 ml drained, brown fluid.  TTE (11/2/2023) - LVEF 60-65%, post 350ml drainage, residual large pericardial loculation adjacent to the lateral wall with fibrinous stranding.   TTE (11/4/2023) - LVEF 55%, moderate pericardial effusion, no evidence of tamponade, left sided pleural effusion.   TTE (11/7/2023) - LVEF 55%, septal bounce, moderate pericardial effusion, complex/organized appearing posteriorly, no MV inflow variation, no RA/RV collapse, dilated IVC (2.4 cm) with >50% insp collapse.   CPTE (10/31/2023) - no PE, posterior bibasal airspace opacities, large pericardial effusion.   CMR (11/6/2023) - LVEF 55%, normal myocardial T2 values (no evidence of myocarditis), no significant myocardial LGE, thickened pericardium with elevated STIR signal and LGE suggestive of pericarditis, moderate-large pericardial perfusion with fibrinous material, mild respirophasic septal flattening suggestive of possible constrictive physiology. No RV/RA collapse. Small bilateral pleural effusions. .        Chief  Complaint:  No chief complaint on file.    HPI  Progressing well. Has stopped colchicine and metoprolol, remains asymptomatic from a cardiac viewpoint.   Sick for one-day after stopping colchicine, URTI 1-day, fever 100 degrees, resolved.  CRP 0.13 (off-therapy).   Exercises regularly - walking daily, resistance workout, runs.    ROS  A 10-system review was performed and was unremarkable apart from what is presented in the HPI.     Objective   Physical Exam  Alert and orientated.   Appropriate responses, normal affect.  No respiratory distress at rest.   Skin warm and dry.   Normal radial pulse character and volume. Clinically SR.   Anicteric sclera, no conjunctival pallor.   No JVD or carotid bruits.  Heart sounds dual, no added heart sounds or audible murmurs. No rub.   Chest clear on auscultation.   Calves soft, non-tender.  No pedal edema.    Lab Review:   Lab Results   Component Value Date     01/17/2024    K 4.3 01/17/2024     01/17/2024    CO2 26 01/17/2024    BUN 17 01/17/2024    CREATININE 0.94 01/17/2024    GLUCOSE 84 01/17/2024    CALCIUM 9.9 01/17/2024     Lab Results   Component Value Date    WBC 8.1 11/21/2023    HGB 11.5 (L) 11/21/2023    HCT 36.5 (L) 11/21/2023    MCV 86 11/21/2023     11/21/2023     Lab Results   Component Value Date    CHOL 121 10/24/2023    TRIG 74 10/24/2023    HDL 20.4 10/24/2023       Assessment/Plan   In summary, Jonah Britton is a 33 y.o. Jonah Britton is a 33 y.o. male pericarditis, paroxysmal atrial fibrillation and VEBs.  He has stopped taking colchicine and metoprolol without symptomatic recurrence. His recent CRP off-therapy was 0.13 consistent with the absence of any residual inflammation.  He is currently exercising regularly without issue.  His cardiovascular examination today was within normal limits.  At this point Jonah may continue regular activities without limitation.  I have informed him that there is an estimated 15-30% risk of recurrent  pericarditis following an initial episode.  He does not require scheduled follow-up but if there is any recurrence of symptoms [back/shoulder pain, chest pain or fever] I will arrange to promptly review him in clinic.

## 2024-04-30 NOTE — PATIENT INSTRUCTIONS
Your recent inflammatory markers off medication were in normal range, consistent with the absence of any residual inflammation.     You may continue regular activities without restriction.     I will follow-up with you on an as needed basis. Specifically, if you have any recurrence of symptoms (back/shoulder pain, fever etc) please notify my office and we will arrange follow-up/review.

## 2024-06-20 ENCOUNTER — OFFICE VISIT (OUTPATIENT)
Dept: PRIMARY CARE | Facility: CLINIC | Age: 34
End: 2024-06-20
Payer: COMMERCIAL

## 2024-06-20 VITALS
WEIGHT: 231 LBS | HEART RATE: 72 BPM | DIASTOLIC BLOOD PRESSURE: 80 MMHG | HEIGHT: 77 IN | BODY MASS INDEX: 27.28 KG/M2 | SYSTOLIC BLOOD PRESSURE: 130 MMHG

## 2024-06-20 DIAGNOSIS — G43.809 MIGRAINE VARIANT WITH HEADACHE: Primary | ICD-10-CM

## 2024-06-20 PROBLEM — R05.9 COUGH, UNSPECIFIED: Status: RESOLVED | Noted: 2023-10-19 | Resolved: 2024-06-20

## 2024-06-20 PROBLEM — S62.012A: Status: RESOLVED | Noted: 2024-01-16 | Resolved: 2024-06-20

## 2024-06-20 PROBLEM — I25.9 MYOCARDIAL ISCHEMIA: Status: RESOLVED | Noted: 2024-01-18 | Resolved: 2024-06-20

## 2024-06-20 PROBLEM — R00.2 PALPITATIONS: Status: RESOLVED | Noted: 2024-01-18 | Resolved: 2024-06-20

## 2024-06-20 PROBLEM — M54.50 LOW BACK PAIN: Status: RESOLVED | Noted: 2024-01-18 | Resolved: 2024-06-20

## 2024-06-20 PROBLEM — I30.1 ACUTE VIRAL PERICARDITIS (HHS-HCC): Status: RESOLVED | Noted: 2023-12-19 | Resolved: 2024-06-20

## 2024-06-20 PROBLEM — R00.0 TACHYCARDIA: Status: RESOLVED | Noted: 2023-11-17 | Resolved: 2024-06-20

## 2024-06-20 PROBLEM — I31.39 PERICARDIAL EFFUSION (HHS-HCC): Status: RESOLVED | Noted: 2023-10-31 | Resolved: 2024-06-20

## 2024-06-20 PROCEDURE — 99214 OFFICE O/P EST MOD 30 MIN: CPT | Performed by: FAMILY MEDICINE

## 2024-06-20 PROCEDURE — 1036F TOBACCO NON-USER: CPT | Performed by: FAMILY MEDICINE

## 2024-06-20 RX ORDER — METHYLPREDNISOLONE 4 MG/1
TABLET ORAL
Qty: 21 TABLET | Refills: 0 | Status: SHIPPED | OUTPATIENT
Start: 2024-06-20

## 2024-06-20 ASSESSMENT — PATIENT HEALTH QUESTIONNAIRE - PHQ9
2. FEELING DOWN, DEPRESSED OR HOPELESS: NOT AT ALL
SUM OF ALL RESPONSES TO PHQ9 QUESTIONS 1 AND 2: 0
1. LITTLE INTEREST OR PLEASURE IN DOING THINGS: NOT AT ALL

## 2024-06-20 NOTE — PROGRESS NOTES
Subjective   Patient ID: Jonah Britton is a 33 y.o. male who presents for Migraine (Every morning along with a fever for the last 5-7 days).    Past Medical, Surgical, and Family History reviewed and updated in chart.    Reviewed all medications by prescribing practitioner or clinical pharmacist (such as prescriptions, OTCs, herbal therapies and supplements) and documented in the medical record.    HPI  Jonah presents with a 7-day history of bilateral temporal headaches, generally worse in the early morning to the point of waking him from sleep, although not at the same time each day. He had a maximum recorded temperature (Tmax) of 101°F two days ago, but no fever for the past 5 days. The fever is usually controlled with Tylenol and ibuprofen, but the headache, although improved with these medications, persists throughout the day.    Current Symptoms:  No body aches, chills, or sore throat.  Fever dissipates as the day progresses and with the use of antipyretics.  Two days ago, he experienced nausea but no vomiting after taking a proton pump inhibitor (PPI). No subsequent nausea.  No visual disturbances, aura, syncope, lightheadedness, or any other neurological concerns.    His mother has a history of migraine headaches, but Jonah himself has never experienced headache symptoms like these before.    In April, Jonah was cleared by his cardiologist from a cardiac standpoint regarding his history of pericarditis and has been allowed to return to normal physical activities without restrictions, which he has done.    Currently does not take any daily medications, only antipyretics (Tylenol and ibuprofen) for his symptoms.    The symptoms have not prevented him from working, but he is concerned about the possibility of a more significant underlying issue.    Review of Systems  All pertinent positive symptoms are included in the history of present illness.    All other systems have been reviewed and are negative and  noncontributory to this patient's current ailments.    Past Medical History:   Diagnosis Date    Acute viral pericarditis ([facility]) 2023    Closed displaced fracture of distal pole of navicular bone of left wrist 2024    Myocardial ischemia 2024    Pericardial effusion ([facility]) 10/31/2023     Past Surgical History:   Procedure Laterality Date    CARDIAC CATHETERIZATION N/A 2023    Procedure: Pericardiocentesis;  Surgeon: Jazmyn Rich MD;  Location: [place] [address] [facility];  Service: Cardiovascular;  Laterality: N/A;     Social History     Tobacco Use    Smoking status: Former     Current packs/day: 0.00     Types: Cigarettes     Start date:      Quit date: 10/1/2019     Years since quittin.7    Smokeless tobacco: Never    Tobacco comments:     Smoked about 1 pack of cigarettes per week for about 7 years   Vaping Use    Vaping status: Never Used   Substance Use Topics    Alcohol use: Yes     Alcohol/week: 2.0 standard drinks of alcohol     Types: 2 Standard drinks or equivalent per week    Drug use: Never     Family History   Problem Relation Name Age of Onset    Migraines Mother      Hypothyroidism Father      Breast cancer Paternal Grandmother      Skin cancer Paternal Grandmother      Hypothyroidism Paternal Grandmother      Atrial fibrillation Paternal Grandmother      Alzheimer's disease Paternal Grandfather       Immunization History   Administered Date(s) Administered    DTP 1991, 1992, 1992    DTaP, Unspecified 1993, 1996    Flu vaccine (IIV4), preservative free *Check age/dose* 2019    Hepatitis B vaccine, 19 yrs and under (RECOMBIVAX, ENGERIX) 08/10/1999, 1999, 2000    HiB PRP-T conjugate vaccine (HIBERIX, ACTHIB) 1991, 1992, 1992, 1993    MMR vaccine, subcutaneous (MMR II) 1993, 08/10/1999    Meningococcal ACWY-D (Menactra) 4-valent conjugate vaccine 2010    OPV 1991,  "02/18/1992, 04/13/1993, 09/16/1996    Pfizer Purple Cap SARS-CoV-2 10/06/2021, 10/28/2021    Td (adult), unspecified 06/22/2004    Tdap vaccine, age 7 year and older (BOOSTRIX, ADACEL) 06/04/2010, 01/17/2019     Current Outpatient Medications   Medication Instructions    methylPREDNISolone (Medrol, [place],) 4 mg tablets Follow schedule on package instructions    prednisoLONE acetate (Pred-Forte) 1 % ophthalmic suspension INSTILL 1 DROP IN BOTH EYES THREE TIMES DAILY FOR 5 DAYS THEN 1 DROP IN BOTH EYES ONCE DAILY FOR 5 DAYS     No Known Allergies    Objective   Vitals:    06/20/24 1445   BP: 130/80   Pulse: 72   Weight: 105 kg (231 lb)   Height: 1.956 m (6' 5\")     Body mass index is 27.39 kg/m².    BP Readings from Last 3 Encounters:   06/20/24 130/80   04/30/24 144/86   02/27/24 133/79      Wt Readings from Last 3 Encounters:   06/20/24 105 kg (231 lb)   04/30/24 106 kg (233 lb 3.2 oz)   02/27/24 103 kg (228 lb)      No visits with results within 1 Month(s) from this visit.   Latest known visit with results is:   Lab on 04/22/2024   Component Date Value    C-Reactive Protein 04/22/2024 0.13      Physical Exam  CONSTITUTIONAL - well nourished, well developed, looks like stated age, in no acute distress, not ill-appearing, and not tired appearing  SKIN - normal skin color and pigmentation, normal skin turgor without rash, lesions, or nodules visualized  HEAD - no trauma, normocephalic  EYES - pupils are equal and reactive to light, extraocular muscles are intact, and normal external exam  ENT - TM's intact, no injection, no signs of infection, uvula midline, normal tongue movement and throat normal, no exudate  NECK - supple without rigidity, no neck mass was observed, no thyromegaly or thyroid nodules  CHEST - clear to auscultation, no wheezing, no crackles and no rales, good effort  CARDIAC - regular rate and regular rhythm, no skipped beats, no murmur  EXTREMITIES - no obvious or evident edema, no obvious or evident " deformities  NEUROLOGICAL - normal gait, normal balance, normal motor, no ataxia, DTRs equal and symmetrical; alert, oriented and no focal signs, cranial nerves II through XII grossly intact  PSYCHIATRIC - alert, pleasant and cordial, age-appropriate  IMMUNOLOGIC - no cervical lymphadenopathy    Assessment/Plan   Problem List Items Addressed This Visit       Migraine variant with headache - Primary     We have mutually decided to attempt breaking this cycle of recurrence, and accordingly, a Medrol Dosepak has been provided. If there is no improvement within the next week, further evaluation with intracranial imaging, such as an MRI, is recommended. Should symptoms improve but later recur, we may need to consider the use of migraine medications, such as a triptan, for abortive therapy.    Please continue to maintain proper hydration and use antipyretics as needed. Notify me within the next week if there is no significant improvement.    Currently, the cause of your fever is unclear, as your examination does not indicate any underlying infection. However, if the fever persists, we will need to take a more aggressive approach in our diagnostic workup.         Relevant Medications    methylPREDNISolone (Medrol, [place],) 4 mg tablets      [address] Detailed

## 2024-06-20 NOTE — ASSESSMENT & PLAN NOTE
We have mutually decided to attempt breaking this cycle of recurrence, and accordingly, a Medrol Dosepak has been provided. If there is no improvement within the next week, further evaluation with intracranial imaging, such as an MRI, is recommended. Should symptoms improve but later recur, we may need to consider the use of migraine medications, such as a triptan, for abortive therapy.    Please continue to maintain proper hydration and use antipyretics as needed. Notify me within the next week if there is no significant improvement.    Currently, the cause of your fever is unclear, as your examination does not indicate any underlying infection. However, if the fever persists, we will need to take a more aggressive approach in our diagnostic workup.

## 2024-06-21 DIAGNOSIS — R50.9 FEVER, UNSPECIFIED FEVER CAUSE: Primary | ICD-10-CM

## 2024-06-22 ENCOUNTER — LAB (OUTPATIENT)
Dept: LAB | Facility: LAB | Age: 34
End: 2024-06-22
Payer: COMMERCIAL

## 2024-06-22 DIAGNOSIS — I30.1 ACUTE VIRAL PERICARDITIS (HHS-HCC): ICD-10-CM

## 2024-06-22 DIAGNOSIS — I30.0 ACUTE IDIOPATHIC PERICARDITIS (HHS-HCC): ICD-10-CM

## 2024-06-22 DIAGNOSIS — R50.9 FEVER, UNSPECIFIED FEVER CAUSE: Primary | ICD-10-CM

## 2024-06-22 DIAGNOSIS — R50.9 FEVER, UNSPECIFIED FEVER CAUSE: ICD-10-CM

## 2024-06-22 LAB
ALBUMIN SERPL BCP-MCNC: 4.9 G/DL (ref 3.4–5)
ALP SERPL-CCNC: 72 U/L (ref 33–120)
ALT SERPL W P-5'-P-CCNC: 19 U/L (ref 10–52)
ANION GAP SERPL CALC-SCNC: 15 MMOL/L (ref 10–20)
AST SERPL W P-5'-P-CCNC: 15 U/L (ref 9–39)
BASOPHILS # BLD AUTO: 0.04 X10*3/UL (ref 0–0.1)
BASOPHILS NFR BLD AUTO: 0.5 %
BILIRUB SERPL-MCNC: 0.7 MG/DL (ref 0–1.2)
BUN SERPL-MCNC: 14 MG/DL (ref 6–23)
CALCIUM SERPL-MCNC: 9.5 MG/DL (ref 8.6–10.3)
CARDIAC TROPONIN I PNL SERPL HS: <3 NG/L (ref 0–20)
CHLORIDE SERPL-SCNC: 99 MMOL/L (ref 98–107)
CO2 SERPL-SCNC: 25 MMOL/L (ref 21–32)
CREAT SERPL-MCNC: 0.89 MG/DL (ref 0.5–1.3)
CRP SERPL HS-MCNC: 0.3 MG/L
CRP SERPL-MCNC: <0.1 MG/DL
EGFRCR SERPLBLD CKD-EPI 2021: >90 ML/MIN/1.73M*2
EOSINOPHIL # BLD AUTO: 0.13 X10*3/UL (ref 0–0.7)
EOSINOPHIL NFR BLD AUTO: 1.7 %
ERYTHROCYTE [DISTWIDTH] IN BLOOD BY AUTOMATED COUNT: 11.9 % (ref 11.5–14.5)
ERYTHROCYTE [SEDIMENTATION RATE] IN BLOOD BY WESTERGREN METHOD: 7 MM/H (ref 0–15)
GLUCOSE SERPL-MCNC: 84 MG/DL (ref 74–99)
HCT VFR BLD AUTO: 48.4 % (ref 41–52)
HGB BLD-MCNC: 16.6 G/DL (ref 13.5–17.5)
IMM GRANULOCYTES # BLD AUTO: 0.01 X10*3/UL (ref 0–0.7)
IMM GRANULOCYTES NFR BLD AUTO: 0.1 % (ref 0–0.9)
LDH SERPL L TO P-CCNC: 102 U/L (ref 84–246)
LYMPHOCYTES # BLD AUTO: 2.26 X10*3/UL (ref 1.2–4.8)
LYMPHOCYTES NFR BLD AUTO: 29.7 %
MCH RBC QN AUTO: 29 PG (ref 26–34)
MCHC RBC AUTO-ENTMCNC: 34.3 G/DL (ref 32–36)
MCV RBC AUTO: 85 FL (ref 80–100)
MONOCYTES # BLD AUTO: 0.49 X10*3/UL (ref 0.1–1)
MONOCYTES NFR BLD AUTO: 6.4 %
NEUTROPHILS # BLD AUTO: 4.69 X10*3/UL (ref 1.2–7.7)
NEUTROPHILS NFR BLD AUTO: 61.6 %
NRBC BLD-RTO: 0 /100 WBCS (ref 0–0)
PLATELET # BLD AUTO: 255 X10*3/UL (ref 150–450)
POTASSIUM SERPL-SCNC: 4.1 MMOL/L (ref 3.5–5.3)
PROT SERPL-MCNC: 7.3 G/DL (ref 6.4–8.2)
RBC # BLD AUTO: 5.72 X10*6/UL (ref 4.5–5.9)
SODIUM SERPL-SCNC: 135 MMOL/L (ref 136–145)
TSH SERPL-ACNC: 1.52 MIU/L (ref 0.44–3.98)
WBC # BLD AUTO: 7.6 X10*3/UL (ref 4.4–11.3)

## 2024-06-22 PROCEDURE — 80053 COMPREHEN METABOLIC PANEL: CPT

## 2024-06-22 PROCEDURE — 85652 RBC SED RATE AUTOMATED: CPT

## 2024-06-22 PROCEDURE — 86141 C-REACTIVE PROTEIN HS: CPT

## 2024-06-22 PROCEDURE — 87040 BLOOD CULTURE FOR BACTERIA: CPT

## 2024-06-22 PROCEDURE — 84443 ASSAY THYROID STIM HORMONE: CPT

## 2024-06-22 PROCEDURE — 83615 LACTATE (LD) (LDH) ENZYME: CPT

## 2024-06-22 PROCEDURE — 85025 COMPLETE CBC W/AUTO DIFF WBC: CPT

## 2024-06-22 PROCEDURE — 86140 C-REACTIVE PROTEIN: CPT

## 2024-06-22 PROCEDURE — 36415 COLL VENOUS BLD VENIPUNCTURE: CPT

## 2024-06-22 PROCEDURE — 87075 CULTR BACTERIA EXCEPT BLOOD: CPT

## 2024-06-22 PROCEDURE — 84484 ASSAY OF TROPONIN QUANT: CPT

## 2024-06-23 LAB — BACTERIA BLD CULT: NORMAL

## 2024-06-23 NOTE — RESULT ENCOUNTER NOTE
All of your inflammatory markers are excellent. Your sedimentation rate is 7, and CRP is undetectable. Blood sugar, kidney, liver, and complete blood cell count are all normal. There is no indication of any type of systemic infection, so I do not think that the fever has anything to do with your endocarditis at this juncture.

## 2024-06-26 LAB — BACTERIA BLD CULT: NORMAL

## 2024-07-31 DIAGNOSIS — I30.0 ACUTE IDIOPATHIC PERICARDITIS (HHS-HCC): Primary | ICD-10-CM

## 2024-07-31 DIAGNOSIS — R50.9 FEVER, UNSPECIFIED FEVER CAUSE: ICD-10-CM

## 2024-07-31 RX ORDER — COLCHICINE 0.6 MG/1
0.6 TABLET ORAL 2 TIMES DAILY
Status: SHIPPED | OUTPATIENT
Start: 2024-07-31 | End: 2025-07-31

## 2024-08-13 ENCOUNTER — APPOINTMENT (OUTPATIENT)
Dept: PRIMARY CARE | Facility: CLINIC | Age: 34
End: 2024-08-13
Payer: COMMERCIAL

## 2024-08-21 ENCOUNTER — APPOINTMENT (OUTPATIENT)
Dept: PRIMARY CARE | Facility: CLINIC | Age: 34
End: 2024-08-21
Payer: COMMERCIAL

## 2024-08-21 VITALS
WEIGHT: 230 LBS | SYSTOLIC BLOOD PRESSURE: 126 MMHG | DIASTOLIC BLOOD PRESSURE: 80 MMHG | BODY MASS INDEX: 27.16 KG/M2 | HEART RATE: 68 BPM | HEIGHT: 77 IN

## 2024-08-21 DIAGNOSIS — A08.4 VIRAL GASTROENTERITIS: ICD-10-CM

## 2024-08-21 DIAGNOSIS — I49.3 PVC (PREMATURE VENTRICULAR CONTRACTION): ICD-10-CM

## 2024-08-21 DIAGNOSIS — R50.9 RECURRENT FEVER, CAUSE UNKNOWN: Primary | ICD-10-CM

## 2024-08-21 DIAGNOSIS — A23.9 MEDITERRANEAN FEVER: Primary | ICD-10-CM

## 2024-08-21 PROCEDURE — 1036F TOBACCO NON-USER: CPT | Performed by: FAMILY MEDICINE

## 2024-08-21 PROCEDURE — RXMED WILLOW AMBULATORY MEDICATION CHARGE

## 2024-08-21 PROCEDURE — 99214 OFFICE O/P EST MOD 30 MIN: CPT | Performed by: FAMILY MEDICINE

## 2024-08-21 PROCEDURE — 3008F BODY MASS INDEX DOCD: CPT | Performed by: FAMILY MEDICINE

## 2024-08-21 RX ORDER — METOPROLOL SUCCINATE 25 MG/1
12.5 TABLET, EXTENDED RELEASE ORAL DAILY
Qty: 45 TABLET | Refills: 1 | Status: SHIPPED | OUTPATIENT
Start: 2024-08-21 | End: 2025-02-17

## 2024-08-21 NOTE — ASSESSMENT & PLAN NOTE
Your Holter monitor from January showed episodes of PVCs that had been managed with metoprolol. Given that your palpitations have returned, we made a shared decision to start metoprolol XL 25 mg, 1/2 tablet daily. This adjustment aims to help control not only your palpitations but also some of the stress you have been experiencing.    Please notify us if you continue to notice palpitations so that we can adjust your medication as necessary.

## 2024-08-21 NOTE — ASSESSMENT & PLAN NOTE
We discussed that your most recent episode of gastrointestinal symptoms and fever were likely viral.   Glad to hear that your symptoms have resolved.  At this juncture, I do not feel that further workup is warranted.

## 2024-08-21 NOTE — PROGRESS NOTES
Subjective   Patient ID: Jonah Britton is a 33 y.o. male who presents for Sinusitis.    Past Medical, Surgical, and Family History reviewed and updated in chart.    Reviewed all medications by prescribing practitioner or clinical pharmacist (such as prescriptions, OTCs, herbal therapies and supplements) and documented in the medical record.    HPI  1. Nausea, Vomiting, Diarrhea  Jonah is presenting for follow-up after experiencing an episode of fever accompanied by nausea, vomiting, and diarrhea. He reports that he returned from a weekend trip to [place] about three weeks ago and began feeling unwell the following Tuesday. The onset of his symptoms occurred midway through his golf league, where he experienced nausea and vomiting, along with a fever. He mentions that none of his travel companions became ill and is uncertain if he consumed any contaminated food. His vomiting persisted throughout that night but subsided by the next morning.    Jonah subsequently developed diarrhea and remained febrile for two days before all symptoms resolved on their own without medical intervention. 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 Although he generally feels well today and is afebrile, he is concerned about the recurrence of these febrile episodes, especially given his extensive cardiac history, including pericarditis. Jonah expressed frustration over the lack of a definitive explanation for his recurrent fevers and illnesses. His cardiologist, Dr. Saenz, suggested resuming colchicine due to the fever, and Jonah has an appointment in November for genetic testing for Mediterranean fever after negative results for other autoimmune conditions.    Jonah is connected with family members worldwide through a Facebook page and has been in communication with a cousin in [place] who was diagnosed  with Mediterranean fever. Given his Mediterranean descent and familial connection, Jonah is considering further investigation into this condition. During his pericarditis episode, it was recommended that he be tested for Mediterranean fever, but he declined at the time. His cousin, who takes colchicine routinely, is reportedly asymptomatic.    2. Palpitations  Jonah also discussed his history of premature ventricular contractions (PVCs), which were observed on his Holter monitor. He follows closely with cardiology and noted that his cardiologist discontinued his metoprolol at his last visit because it was deemed unnecessary. However, Jonah now notices his palpitations more frequently. He denies any associated chest pain, shortness of breath, or syncope, describing the sensation as mostly an uncomfortable feeling in his chest. He also states that he does not use tobacco products, limits his caffeine intake, and does not use any illicit drugs.    Review of Systems  All pertinent positive symptoms are included in the history of present illness.    All other systems have been reviewed and are negative and noncontributory to this patient's current ailments.    Past Medical History:   Diagnosis Date    Acute viral pericarditis ([facility]) 2023    Closed displaced fracture of distal pole of navicular bone of left wrist 2024    Myocardial ischemia 2024    Pericardial effusion ([facility]) 10/31/2023     Past Surgical History:   Procedure Laterality Date    CARDIAC CATHETERIZATION N/A 2023    Procedure: Pericardiocentesis;  Surgeon: Jazmyn Rich MD;  Location: [place] [address] Cardiac Cath Lab;  Service: Cardiovascular;  Laterality: N/A;     Social History     Tobacco Use    Smoking status: Former     Current packs/day: 0.00     Types: Cigarettes     Start date:      Quit date: 10/1/2019     Years since quittin.8    Smokeless tobacco: Never    Tobacco comments:     Smoked about 1 pack of cigarettes  "per week for about 7 years   Vaping Use    Vaping status: Never Used   Substance Use Topics    Alcohol use: Yes     Alcohol/week: 2.0 standard drinks of alcohol     Types: 2 Standard drinks or equivalent per week    Drug use: Never     Family History   Problem Relation Name Age of Onset    Migraines Mother      Hypothyroidism Father      Breast cancer Paternal Grandmother      Skin cancer Paternal Grandmother      Hypothyroidism Paternal Grandmother      Atrial fibrillation Paternal Grandmother      Alzheimer's disease Paternal Grandfather       Immunization History   Administered Date(s) Administered    DTP 1991, 02/18/1992, 04/14/1992    DTaP, Unspecified 04/13/1993, 09/16/1996    Flu vaccine (IIV4), preservative free *Check age/dose* 01/17/2019    Hepatitis B vaccine, 19 yrs and under (RECOMBIVAX, ENGERIX) 08/10/1999, 09/14/1999, 02/08/2000    HiB PRP-T conjugate vaccine (HIBERIX, ACTHIB) 1991, 02/18/1992, 04/14/1992, 01/12/1993    MMR vaccine, subcutaneous (MMR II) 01/12/1993, 08/10/1999    Meningococcal ACWY-D (Menactra) 4-valent conjugate vaccine 06/04/2010    OPV 1991, 02/18/1992, 04/13/1993, 09/16/1996    Pfizer Purple Cap SARS-CoV-2 10/06/2021, 10/28/2021    Td (adult), unspecified 06/22/2004    Tdap vaccine, age 7 year and older (BOOSTRIX, ADACEL) 06/04/2010, 01/17/2019     Current Outpatient Medications   Medication Instructions    metoprolol succinate XL (TOPROL-XL) 12.5 mg, oral, Daily, Do not crush or chew.    prednisoLONE acetate (Pred-Forte) 1 % ophthalmic suspension INSTILL 1 DROP IN BOTH EYES THREE TIMES DAILY FOR 5 DAYS THEN 1 DROP IN BOTH EYES ONCE DAILY FOR 5 DAYS     No Known Allergies    Objective   Vitals:    08/21/24 0927   BP: 126/80   Pulse: 68   Weight: 104 kg (230 lb)   Height: 1.956 m (6' 5\")     Body mass index is 27.27 kg/m².    BP Readings from Last 3 Encounters:   08/21/24 126/80   06/20/24 130/80   04/30/24 144/86      Wt Readings from Last 3 Encounters: "   08/21/24 104 kg (230 lb)   06/20/24 105 kg (231 lb)   04/30/24 106 kg (233 lb 3.2 oz)        No visits with results within 1 Month(s) from this visit.   Latest known visit with results is:   Lab on 06/22/2024   Component Date Value    CRP, High Sensitivity 06/22/2024 0.3     Glucose 06/22/2024 84     Sodium 06/22/2024 135 (L)     Potassium 06/22/2024 4.1     Chloride 06/22/2024 99     Bicarbonate 06/22/2024 25     Anion Gap 06/22/2024 15     Urea Nitrogen 06/22/2024 14     Creatinine 06/22/2024 0.89     eGFR 06/22/2024 >90     Calcium 06/22/2024 9.5     Albumin 06/22/2024 4.9     Alkaline Phosphatase 06/22/2024 72     Total Protein 06/22/2024 7.3     AST 06/22/2024 15     Bilirubin, Total 06/22/2024 0.7     ALT 06/22/2024 19     Sedimentation Rate 06/22/2024 7     Troponin I, High Sensiti* 06/22/2024 <3     C-Reactive Protein 06/22/2024 <0.10     WBC 06/22/2024 7.6     nRBC 06/22/2024 0.0     RBC 06/22/2024 5.72     Hemoglobin 06/22/2024 16.6     Hematocrit 06/22/2024 48.4     MCV 06/22/2024 85     MCH 06/22/2024 29.0     MCHC 06/22/2024 34.3     RDW 06/22/2024 11.9     Platelets 06/22/2024 255     Neutrophils % 06/22/2024 61.6     Immature Granulocytes %,* 06/22/2024 0.1     Lymphocytes % 06/22/2024 29.7     Monocytes % 06/22/2024 6.4     Eosinophils % 06/22/2024 1.7     Basophils % 06/22/2024 0.5     Neutrophils Absolute 06/22/2024 4.69     Immature Granulocytes Ab* 06/22/2024 0.01     Lymphocytes Absolute 06/22/2024 2.26     Monocytes Absolute 06/22/2024 0.49     Eosinophils Absolute 06/22/2024 0.13     Basophils Absolute 06/22/2024 0.04     LDH 06/22/2024 102     Thyroid Stimulating Horm* 06/22/2024 1.52     Blood Culture 06/22/2024 No growth at 4 days -  FINAL REPORT      Physical Exam  CONSTITUTIONAL - well nourished, well developed, looks like stated age, in no acute distress, not ill-appearing, and not tired appearing  SKIN - normal skin color and pigmentation, normal skin turgor without rash, lesions, or  nodules visualized  CHEST - clear to auscultation, no wheezing, no crackles and no rales, good effort  CARDIAC - regular rate and regular rhythm with some skipped beats, no murmur  EXTREMITIES - no obvious or evident edema, no obvious or evident deformities  PSYCHIATRIC - alert, pleasant and cordial, age-appropriate  IMMUNOLOGIC - no cervical lymphadenopathy    Assessment/Plan   Problem List Items Addressed This Visit       Recurrent fever, cause unknown - Primary     We conducted an extensive review of your continued fevers after your hospitalization for pericarditis.   We agreed that you were well managed on colchicine and will tentatively plan to restart the medication once the genetic testing results for Mediterranean fever come back, especially if positive.   Please do not hesitate to call us back with any new concerns and we will get you in as soon as possible.         Viral gastroenteritis     We discussed that your most recent episode of gastrointestinal symptoms and fever were likely viral.   Glad to hear that your symptoms have resolved.  At this juncture, I do not feel that further workup is warranted.         PVC (premature ventricular contraction)     Your Holter monitor from January showed episodes of PVCs that had been managed with metoprolol. Given that your palpitations have returned, we made a shared decision to start metoprolol XL 25 mg, 1/2 tablet daily. This adjustment aims to help control not only your palpitations but also some of the stress you have been experiencing.    Please notify us if you continue to notice palpitations so that we can adjust your medication as necessary.         Relevant Medications    metoprolol succinate XL (Toprol-XL) 25 mg 24 hr tablet

## 2024-08-21 NOTE — ASSESSMENT & PLAN NOTE
We conducted an extensive review of your continued fevers after your hospitalization for pericarditis.   We agreed that you were well managed on colchicine and will tentatively plan to restart the medication once the genetic testing results for Mediterranean fever come back, especially if positive.   Please do not hesitate to call us back with any new concerns and we will get you in as soon as possible.

## 2024-08-23 ENCOUNTER — PHARMACY VISIT (OUTPATIENT)
Dept: PHARMACY | Facility: CLINIC | Age: 34
End: 2024-08-23
Payer: COMMERCIAL

## 2024-08-23 PROCEDURE — RXMED WILLOW AMBULATORY MEDICATION CHARGE

## 2024-08-23 RX ORDER — COLCHICINE 0.6 MG/1
0.6 TABLET ORAL DAILY
Qty: 90 TABLET | Refills: 3 | Status: SHIPPED | OUTPATIENT
Start: 2024-08-23 | End: 2025-08-23

## 2024-09-03 DIAGNOSIS — I49.3 PVC (PREMATURE VENTRICULAR CONTRACTION): ICD-10-CM

## 2024-09-03 RX ORDER — METOPROLOL SUCCINATE 25 MG/1
25 TABLET, EXTENDED RELEASE ORAL DAILY
Qty: 90 TABLET | Refills: 1 | Status: SHIPPED | OUTPATIENT
Start: 2024-09-03 | End: 2025-03-02

## 2024-09-04 PROCEDURE — RXMED WILLOW AMBULATORY MEDICATION CHARGE

## 2024-09-05 ENCOUNTER — PHARMACY VISIT (OUTPATIENT)
Dept: PHARMACY | Facility: CLINIC | Age: 34
End: 2024-09-05
Payer: COMMERCIAL

## 2024-09-28 PROCEDURE — RXMED WILLOW AMBULATORY MEDICATION CHARGE

## 2024-10-01 ENCOUNTER — PHARMACY VISIT (OUTPATIENT)
Dept: PHARMACY | Facility: CLINIC | Age: 34
End: 2024-10-01
Payer: COMMERCIAL

## 2024-10-04 PROCEDURE — RXMED WILLOW AMBULATORY MEDICATION CHARGE

## 2024-10-09 ENCOUNTER — PHARMACY VISIT (OUTPATIENT)
Dept: PHARMACY | Facility: CLINIC | Age: 34
End: 2024-10-09
Payer: COMMERCIAL

## 2024-10-28 PROCEDURE — RXMED WILLOW AMBULATORY MEDICATION CHARGE

## 2024-10-30 ENCOUNTER — PHARMACY VISIT (OUTPATIENT)
Dept: PHARMACY | Facility: CLINIC | Age: 34
End: 2024-10-30
Payer: COMMERCIAL

## 2024-11-04 PROCEDURE — RXMED WILLOW AMBULATORY MEDICATION CHARGE

## 2024-11-06 ENCOUNTER — PHARMACY VISIT (OUTPATIENT)
Dept: PHARMACY | Facility: CLINIC | Age: 34
End: 2024-11-06
Payer: COMMERCIAL

## 2024-11-13 ENCOUNTER — APPOINTMENT (OUTPATIENT)
Dept: GENETICS | Facility: CLINIC | Age: 34
End: 2024-11-13
Payer: COMMERCIAL

## 2024-11-13 ENCOUNTER — LAB (OUTPATIENT)
Dept: LAB | Facility: LAB | Age: 34
End: 2024-11-13
Payer: COMMERCIAL

## 2024-11-13 VITALS
HEART RATE: 64 BPM | TEMPERATURE: 98.2 F | DIASTOLIC BLOOD PRESSURE: 95 MMHG | HEIGHT: 77 IN | BODY MASS INDEX: 29 KG/M2 | RESPIRATION RATE: 18 BRPM | WEIGHT: 245.59 LBS | SYSTOLIC BLOOD PRESSURE: 148 MMHG

## 2024-11-13 DIAGNOSIS — R50.9 FEVER, UNSPECIFIED FEVER CAUSE: Primary | ICD-10-CM

## 2024-11-13 DIAGNOSIS — R50.9 FEVER, UNSPECIFIED FEVER CAUSE: ICD-10-CM

## 2024-11-13 PROCEDURE — 99214 OFFICE O/P EST MOD 30 MIN: CPT | Performed by: INTERNAL MEDICINE

## 2024-11-13 PROCEDURE — 3008F BODY MASS INDEX DOCD: CPT | Performed by: INTERNAL MEDICINE

## 2024-11-13 ASSESSMENT — ENCOUNTER SYMPTOMS
OCCASIONAL FEELINGS OF UNSTEADINESS: 0
LOSS OF SENSATION IN FEET: 0
DEPRESSION: 0

## 2024-11-13 ASSESSMENT — PATIENT HEALTH QUESTIONNAIRE - PHQ9
SUM OF ALL RESPONSES TO PHQ9 QUESTIONS 1 & 2: 0
2. FEELING DOWN, DEPRESSED OR HOPELESS: NOT AT ALL
1. LITTLE INTEREST OR PLEASURE IN DOING THINGS: NOT AT ALL

## 2024-11-13 NOTE — LETTER
11/14/24    Po Marquez DO  [address]  [facility]  [facility] [address]      Dear Dr. Po Marquez DO,    I am writing to confirm that your patient, Jonah Britton  received care in my office on 11/14/24. I have enclosed a summary of the care provided to Jonah for your reference.    Please contact me with any questions you may have regarding the visit.    Sincerely,         Sanjana Fernandes MD  [address] [facility] [address]  [facility] [address]    CC: No Recipients

## 2024-11-13 NOTE — PROGRESS NOTES
"  Genetics Department  [address] [place], [place] [address]  P: [phone]  F: [phone]      Subjective:   Reason for Visit:   Jonah is a 34 y.o. male who presents to Genetics clinic for an evaluation to rule out a genetic etiology for his history of fevers. Jonah is being seen in consultation at the request of Dr. Saenz. The information for this visit was obtained from the patient and the medical records.    History of Present Illness: 34 year old male with a past medical history of recurrent fevers and PVCs who presents to genetics for evaluation of his recurrent fevers. Jonah has a cousin in [place] who was diagnosed with familial mediterranean fever with a genetic test, though he does not have the test report at this time. This cousin had periodic fevers, takes colchicine and this helped with his symptoms. Cousin is ~late 40s and is \"distantly\" related per patient. No one else in the family is known to have had FMF    During an admission last year, Jonah was admitted for paroxysmal Afib, pericarditis, and fevers, and was offered genetic testing at that time but was not interested at that time.     Since his discharge last year, he has had fevers ~100-102F - this resolves with tylenol after a few days. When he has these fevers, he has generalized aches and pains and does not feel well. He feels fatigued. At least four times along with PVCs as well. This has improved since he restarted colchicine and metoprolol. He has not had any PVCs for months since restarting his metoprolol and colchicine.     Regarding his activity, he says that he runs regularly, no chest pain or excessive shortness of breath otherwise.  At his last episode he did also have vomiting followed by days of fevers. No night sweats, has been gaining weight since his admission.     In the interim:    He was evaluated by Cardiology most recently on 4/30 due to his history of pericarditis and paroxysmal Afib with WEBs. " He had been on metoprolol for symptomatic management and colchicine for the pericarditis, but he stopped taking these and did not have symptom recurrence at that time. His cardiologist stated in their note on 4/30 that he could continue with regular activities with no limitations.    He was evaluated by orthopedic surgery for a left scaphoid fracture which has mostly recovered. He said this was due to a snowboarding accident.    Past Medical History:  Jonah  has a past medical history of Acute viral pericarditis ([facility]) (12/19/2023), Closed displaced fracture of distal pole of navicular bone of left wrist (01/16/2024), Myocardial ischemia (01/18/2024), and Pericardial effusion ([facility]) (10/31/2023).    Past Surgical History:  Jonah  has a past surgical history that includes Cardiac catheterization (N/A, 11/2/2023).    Sleep History: Previously had fevers, night sweats - resolved on colcicine.     Family History:  Updated, no changes from previous pedigree on 11/20/2023.    Review of Systems:  A full review of systems was reviewed with the family.  Pertinent positives listed in the HPI.  All other systems negative.    MEDICATIONS:     Current Outpatient Medications:     colchicine 0.6 mg tablet, Take 1 tablet (0.6 mg) by mouth once daily., Disp: 90 tablet, Rfl: 3    metoprolol succinate XL (Toprol-XL) 25 mg 24 hr tablet, Take 1 tablet (25 mg) by mouth once daily. Do not crush or chew., Disp: 90 tablet, Rfl: 1    prednisoLONE acetate (Pred-Forte) 1 % ophthalmic suspension, INSTILL 1 DROP IN BOTH EYES THREE TIMES DAILY FOR 5 DAYS THEN 1 DROP IN BOTH EYES ONCE DAILY FOR 5 DAYS, Disp: , Rfl:     ALLERGIES:   Jonah has No Known Allergies.  Objective:     Physical exam:  Constitutional: No acute distress, patient comfortable appearing  HEENT: NCAT  Respiratory: Lungs CTAB  Cardiovascular: Normal rate, regular rhythm, no murmurs appreciated  Gastrointestinal: Soft,, non-distended, +BS  Musculoskeletal: No joint swelling,  no deformity, moves all 4 extremities  Integumentary: Intact, warm, dry no rashes  Neurological: alert, no focal deficits, MAEx4    Assessment and Plan:   Jonah is a 34 y.o. male with a personal history concerning for a periodic fever syndrome, and a family medical history of familial mediterranean fever. Jonah's symptoms have improved significantly with the use of colchicine, and he had a previous admission complicated by pericarditis & pericardial effusion requiring pericardiocentesis. It is unclear what the cause of his cardiac involvement was at this time, but coxsackie viral infection has been considered as a possibility.    Even after his hospitalization, Jonah continues to have fevers when not taking colchicine, which increases the probability of an underlying genetic cause for his symptoms. It is not clear if the cardiac pathology is related to his fevers at this time. In addition, if his cousin has MEFV related familial mediterranean fever, this gene should be sequenced as well (though we do not have access to the testing report). From family history, we do not have enough information to predict inheritance pattern. It would be reasonable to begin with the 18 gene [employer] Periodic Fever Syndromes Panel, and reflex to a broader, autoinflammatory panel if negative.     Plan:  - Draw blood today for [employer] Periodic Fevers Panel. If non-diagnostic, send reflex testing to the autoinflammatory panel. Billing discussed.   - Follow up virtual visit Friday Dec. 6 at 2 PM for results and next steps    The patient was consented for disease-related gene panel testing as follows:  Potential results of testing were explicitly discussed with the patient including the possibility and consequences of positive or negative results, finding variants of uncertain significance (VUS), or finding incidental genetic changes associated with other, unrelated medical conditions.  Specifically, discussed that negative results do not  necessarily mean that there is not a genetic/heritable cause for this disease.    We would like Jonah to follow up in clinic on the above date or sooner should questions or concerns arise. An appointment can be made by calling [phone].     All results will be discussed with the patient/family at the scheduled follow-up appointment unless other arrangements are made at the time of the visit.      The information we discussed is what is known as of this date. With the rapid pace of medical and genetic research, new discoveries may modify our assessment and approach to this patient and/or family in the future.     Thank you for involving us with the care of Jonah.    Bogdan Zamarripa MD, PGY-4 Internal Medicine/Medical Genetics  Supervised by Dr. Dena POLANCO, Medical Geneticist

## 2024-11-13 NOTE — LETTER
11/14/24    Israel Saenz MD  [address] [facility] [address]      Dear Dr. Israel Saenz MD,    Thank you for referring your patient, Jonah Britton, to receive care in my office. I have enclosed a summary of the care provided to Jonah on 11/14/24.    Please contact me with any questions you may have regarding the visit.    Sincerely,         Sanjana Fernandes MD  [address]  [facility] [address]  [facility] [address]    CC: No Recipients

## 2024-11-22 LAB
COMMENTS - MP RESULT TYPE: NORMAL
SCAN RESULT: NORMAL

## 2024-11-27 PROCEDURE — RXMED WILLOW AMBULATORY MEDICATION CHARGE

## 2024-11-29 ENCOUNTER — PHARMACY VISIT (OUTPATIENT)
Dept: PHARMACY | Facility: CLINIC | Age: 34
End: 2024-11-29
Payer: COMMERCIAL

## 2024-12-02 PROCEDURE — RXMED WILLOW AMBULATORY MEDICATION CHARGE

## 2024-12-05 ENCOUNTER — PHARMACY VISIT (OUTPATIENT)
Dept: PHARMACY | Facility: CLINIC | Age: 34
End: 2024-12-05
Payer: COMMERCIAL

## 2024-12-06 ENCOUNTER — APPOINTMENT (OUTPATIENT)
Dept: GENETICS | Facility: CLINIC | Age: 34
End: 2024-12-06
Payer: COMMERCIAL

## 2024-12-06 DIAGNOSIS — R50.9 FEVER, UNSPECIFIED FEVER CAUSE: Primary | ICD-10-CM

## 2024-12-06 NOTE — PROGRESS NOTES
"  MEDICAL GENETICS FOLLOW-UP VISIT NOTE    Patient full name: Jonah Britton  MRN: [id number]  YOB: 1990  Present during this visit: The patient     Primary care provider: Po Marquez DO  Referring provider: Israel Saenz MD (Cardiology)    Medical history was obtained from the patient. Prior to this appointment, I reviewed medical records from outpatient medical records and scanned documents, if available. This visit was completed via televideo. All issues as below were discussed and addressed but no physical exam was performed. If it was felt that the patient should be evaluated in clinic then they were directed there. The patient consented to visit.    Interval history:  Mr. Britton is a 34 y.o. male who returned to Genetics clinic for unexplained episodes of fever. We obtained a next-generation sequencing panel of 156 genes for autoinflammatory disorders and periodic fever syndromes, which came back non-diagnostic, with variant(s) of uncertain significance (VUS) in CASP10.     First note (written by resident):  History of Present Illness: 34 year old male with a past medical history of recurrent fevers and PVCs who presents to genetics for evaluation of his recurrent fevers. Jonah has a cousin in [place] who was diagnosed with familial mediterranean fever with a genetic test, though he does not have the test report at this time. This cousin had periodic fevers, takes colchicine and this helped with his symptoms. Cousin is ~late 40s and is \"distantly\" related per patient. No one else in the family is known to have had FMF     During an admission last year, Jonah was admitted for paroxysmal Afib, pericarditis, and fevers, and was offered genetic testing at that time but was not interested at that time.      Since his discharge last year, he has had fevers ~100-102F - this resolves with tylenol after a few days. When he has these fevers, he has generalized aches and pains and does not feel well. " He feels fatigued. At least four times along with PVCs as well. This has improved since he restarted colchicine and metoprolol. He has not had any PVCs for months since restarting his metoprolol and colchicine.      Regarding his activity, he says that he runs regularly, no chest pain or excessive shortness of breath otherwise.  At his last episode he did also have vomiting followed by days of fevers. No night sweats, has been gaining weight since his admission.      In the interim:     He was evaluated by Cardiology most recently on 4/30 due to his history of pericarditis and paroxysmal Afib with WEBs. He had been on metoprolol for symptomatic management and colchicine for the pericarditis, but he stopped taking these and did not have symptom recurrence at that time. His cardiologist stated in their note on 4/30 that he could continue with regular activities with no limitations.     He was evaluated by orthopedic surgery for a left scaphoid fracture which has mostly recovered. He said this was due to a snowboarding accident.     Past Medical History:  Jonah  has a past medical history of Acute viral pericarditis ([facility]) (12/19/2023), Closed displaced fracture of distal pole of navicular bone of left wrist (01/16/2024), Myocardial ischemia (01/18/2024), and Pericardial effusion ([facility]) (10/31/2023).     Past Surgical History:  Jonah  has a past surgical history that includes Cardiac catheterization (N/A, 11/2/2023).     Sleep History: Previously had fevers, night sweats - resolved on colcicine.     Family History:  Updated, no changes from previous pedigree on 11/20/2023.     Review of Systems:  A full review of systems was reviewed with the family.  Pertinent positives listed in the HPI.  All other systems negative.     MEDICATIONS:     Current Medications[]Expand by Default      Current Outpatient Medications:     colchicine 0.6 mg tablet, Take 1 tablet (0.6 mg) by mouth once daily., Disp: 90 tablet, Rfl: 3     metoprolol succinate XL (Toprol-XL) 25 mg 24 hr tablet, Take 1 tablet (25 mg) by mouth once daily. Do not crush or chew., Disp: 90 tablet, Rfl: 1    prednisoLONE acetate (Pred-Forte) 1 % ophthalmic suspension, INSTILL 1 DROP IN BOTH EYES THREE TIMES DAILY FOR 5 DAYS THEN 1 DROP IN BOTH EYES ONCE DAILY FOR 5 DAYS, Disp: , Rfl:      Physical exam:  Constitutional: No acute distress, patient comfortable appearing  HEENT: NCAT  Respiratory: Lungs CTAB  Cardiovascular: Normal rate, regular rhythm, no murmurs appreciated  Gastrointestinal: Soft,, non-distended, +BS  Musculoskeletal: No joint swelling, no deformity, moves all 4 extremities  Integumentary: Intact, warm, dry no rashes  Neurological: alert, no focal deficits, MAEx4    Results:  Genetic testing  Sequence analysis and deletion/duplication testing of the 156 genes for Periodic Fever Syndromes Panel + Autoinflammatory and Autoimmunity Syndromes Panel:    [id number] (p.[id number])heterozygousUncertain Significance     This sequence change replaces lysine, which is basic and polar, with glutamic acid, which is acidic and polar, at codon 305 of the CASP10 protein(p.[id number]).  This variant is present in population databases ([id number], gnomAD 0.06%), and has an allele count higher than expected for a pathogenicvariant.  This variant has not been reported in the literature in individuals affected with SBGG34-nrkvdal conditions.ClinVar contains an entry for this variant (Variation ID: [id number]).  [employer] of protein sequence and biophysical properties (such as structural, functional, and spatial information, amino acidconservation, physicochemical variation, residue mobility, and thermodynamic stability) indicates that this missense variant is not expected todisrupt CASP10 protein function with a negative predictive value of 80%.In summary, the available evidence is currently insufficient to determine the role of this variant in disease.  Therefore, it has been classified as aVariant of Uncertain Significance.     Assessment:  Mr. Britton is a 34 y.o. male who returned to Genetics clinic for recurrent fever episodes and family history of familial Mediterranean fever (FMF).     A 156-gene panel for periodic fever syndromes and autoinflammatory syndromes came back non-diagnostic with a variant of uncertain significance (VUS) in the CAPS10 gene. These results do not explain his symptoms. There is no identified variant in periodic fever syndrome genes, such as MEFV for FMF.     Pathogenic (disease-causing) variants in CAPS10 cause autoimmune lymphoproliferative disorder (ALPS), a genetic disorder characterized by non-malignant lymphoproliferation (lymphadenopathy, hepatosplenomegaly with or without hypersplenism), autoimmune disease, and increased risk for both Hodgkin and non-Hodgkin lymphoma. Mr. Britton's symptoms do not suggest APLS and this variant is likely not disease-causing. My pretest suspicion for this condition was low. Besides, this variant has a frequency of 0.06% in general population, which seems to be too common for a rare genetic condition. I do not have additional recommendations regarding this VUS. In the future, when this VUS is reclassified, I will notify Mr. Britton.     There are some possibilities why genetic testing came back essentially negative:  1) The cause of recurrent fever is not genetic or hereditary.  2) He may have an undifferentiated periodic fever syndrome.  Some patients experience true periodic fever without an identifiable genetic variant. This could represent an undiagnosed condition or a condition for which current sequencing technology cannot detect the mutation. These cases often respond clinically to colchicine. I recommend that he continue follow-up with his PCP or cardiology team, who are managing colchicine treatment. If symptoms worsen in the future, consulting a rheumatologist for management of  undifferentiated periodic fever syndrome could be considered. Certain biologics are used in these situations.  3) The febrile episodes may be unrelated to each other: Last year, he experienced fever with pericarditis, and testing confirmed Coxsackievirus A, a known cause of viral pericarditis that can present with systemic symptoms. This year, he has had 2-3 febrile episodes. The patient mentioned that he might not have sought medical attention for these episodes if he had not experienced febrile episodes associated with pericarditis last year. The episodes this year have been milder and associated with organ-specific symptoms, including gastroenteritis-like and URI-like symptoms. It is possible that these recent episodes were viral illnesses unrelated to the viral pericarditis he had last year.    Based on the current phenotypes and the fact that this genetic testing was sufficiently comprehensive, I recommend no further testing today.     Plan:  - Test report provided. No further testing is recommended. I will reach out to him when this variant of uncertain significance is reclassified.   - Continue management with colchicine with Cardiology and PCP. Consider seeing Rheumatology if there is a suspicion for undifferentiated periodic fever syndromes. For example, if there are fever episodes while on colchicine, or if fever recurs after colchicine is discontinued.   - Follow-up as needed. I instructed him to reach out to me if symptoms are worsening and/or if there is a suspicion for other genetic disorders.     Thank you for allowing me to participate in the care of this patient. Please do not hesitate to contact me if you have any questions.     Sanjana Fernandes MD    Medical Geneticist  [facility]  Phone: [phone]  Fax: [phone]   Address: [address] [place] [address]  Time spent (this information is required by insurance): face-to-face 20min;  preparation 5min; documentation 15min; total time spent 40min

## 2024-12-23 PROCEDURE — RXMED WILLOW AMBULATORY MEDICATION CHARGE

## 2024-12-24 ENCOUNTER — PHARMACY VISIT (OUTPATIENT)
Dept: PHARMACY | Facility: CLINIC | Age: 34
End: 2024-12-24
Payer: COMMERCIAL

## 2025-01-04 PROCEDURE — RXMED WILLOW AMBULATORY MEDICATION CHARGE

## 2025-01-07 ENCOUNTER — PHARMACY VISIT (OUTPATIENT)
Dept: PHARMACY | Facility: CLINIC | Age: 35
End: 2025-01-07
Payer: COMMERCIAL

## 2025-01-17 ENCOUNTER — APPOINTMENT (OUTPATIENT)
Dept: PRIMARY CARE | Facility: CLINIC | Age: 35
End: 2025-01-17
Payer: COMMERCIAL

## 2025-01-17 PROCEDURE — RXMED WILLOW AMBULATORY MEDICATION CHARGE

## 2025-01-21 ENCOUNTER — PHARMACY VISIT (OUTPATIENT)
Dept: PHARMACY | Facility: CLINIC | Age: 35
End: 2025-01-21
Payer: COMMERCIAL

## 2025-01-22 NOTE — Clinical Note
Called pt    No answer    Left message        Appt has been changed to a virtual visit.         Advised pt to call clinic with any concerns.        Catheter inserted.

## 2025-02-17 DIAGNOSIS — I49.3 PVC (PREMATURE VENTRICULAR CONTRACTION): ICD-10-CM

## 2025-02-17 PROCEDURE — RXMED WILLOW AMBULATORY MEDICATION CHARGE

## 2025-02-17 RX ORDER — METOPROLOL SUCCINATE 25 MG/1
25 TABLET, EXTENDED RELEASE ORAL DAILY
Qty: 90 TABLET | Refills: 1 | OUTPATIENT
Start: 2025-02-17 | End: 2025-08-16

## 2025-02-18 ENCOUNTER — PHARMACY VISIT (OUTPATIENT)
Dept: PHARMACY | Facility: CLINIC | Age: 35
End: 2025-02-18
Payer: COMMERCIAL

## 2025-02-27 ENCOUNTER — APPOINTMENT (OUTPATIENT)
Dept: PRIMARY CARE | Facility: CLINIC | Age: 35
End: 2025-02-27
Payer: COMMERCIAL

## 2025-02-27 VITALS
DIASTOLIC BLOOD PRESSURE: 80 MMHG | BODY MASS INDEX: 27.87 KG/M2 | SYSTOLIC BLOOD PRESSURE: 132 MMHG | HEIGHT: 77 IN | HEART RATE: 57 BPM | WEIGHT: 236 LBS

## 2025-02-27 DIAGNOSIS — Z00.00 ANNUAL PHYSICAL EXAM: Primary | ICD-10-CM

## 2025-02-27 DIAGNOSIS — I49.3 PVC (PREMATURE VENTRICULAR CONTRACTION): ICD-10-CM

## 2025-02-27 DIAGNOSIS — Z23 NEED FOR INFLUENZA VACCINATION: ICD-10-CM

## 2025-02-27 DIAGNOSIS — A23.9 MEDITERRANEAN FEVER: ICD-10-CM

## 2025-02-27 PROBLEM — R50.9 RECURRENT FEVER, CAUSE UNKNOWN: Status: RESOLVED | Noted: 2024-08-21 | Resolved: 2025-02-27

## 2025-02-27 PROBLEM — G43.809 MIGRAINE VARIANT WITH HEADACHE: Status: RESOLVED | Noted: 2024-06-20 | Resolved: 2025-02-27

## 2025-02-27 PROBLEM — R79.82 CRP ELEVATED: Status: RESOLVED | Noted: 2023-12-19 | Resolved: 2025-02-27

## 2025-02-27 PROBLEM — A08.4 VIRAL GASTROENTERITIS: Status: RESOLVED | Noted: 2024-08-21 | Resolved: 2025-02-27

## 2025-02-27 LAB
POC APPEARANCE, URINE: CLEAR
POC BILIRUBIN, URINE: NEGATIVE
POC BLOOD, URINE: NEGATIVE
POC COLOR, URINE: YELLOW
POC GLUCOSE, URINE: NEGATIVE MG/DL
POC KETONES, URINE: NEGATIVE MG/DL
POC LEUKOCYTES, URINE: NEGATIVE
POC NITRITE,URINE: NEGATIVE
POC PH, URINE: 7 PH
POC PROTEIN, URINE: NEGATIVE MG/DL
POC SPECIFIC GRAVITY, URINE: >=1.03
POC UROBILINOGEN, URINE: 0.2 EU/DL

## 2025-02-27 PROCEDURE — RXMED WILLOW AMBULATORY MEDICATION CHARGE

## 2025-02-27 RX ORDER — METOPROLOL SUCCINATE 25 MG/1
25 TABLET, EXTENDED RELEASE ORAL DAILY
Qty: 90 TABLET | Refills: 1 | Status: SHIPPED | OUTPATIENT
Start: 2025-02-27 | End: 2025-08-26

## 2025-02-27 ASSESSMENT — PROMIS GLOBAL HEALTH SCALE
RATE_MENTAL_HEALTH: GOOD
CARRYOUT_PHYSICAL_ACTIVITIES: COMPLETELY
RATE_GENERAL_HEALTH: GOOD
RATE_AVERAGE_FATIGUE: MILD
RATE_SOCIAL_SATISFACTION: EXCELLENT
RATE_QUALITY_OF_LIFE: GOOD
RATE_PHYSICAL_HEALTH: GOOD
RATE_AVERAGE_PAIN: 0
CARRYOUT_SOCIAL_ACTIVITIES: EXCELLENT
EMOTIONAL_PROBLEMS: SOMETIMES

## 2025-02-27 ASSESSMENT — PATIENT HEALTH QUESTIONNAIRE - PHQ9
1. LITTLE INTEREST OR PLEASURE IN DOING THINGS: NOT AT ALL
2. FEELING DOWN, DEPRESSED OR HOPELESS: NOT AT ALL
SUM OF ALL RESPONSES TO PHQ9 QUESTIONS 1 AND 2: 0

## 2025-02-27 NOTE — PROGRESS NOTES
Chief Complaint  Patient ID: Jonah Britton is a 34 y.o. male who presents for Annual Exam.    Past Medical, Surgical, and Family History reviewed and updated in chart.    Reviewed all medications by prescribing practitioner or clinical pharmacist (such as prescriptions, OTCs, herbal therapies and supplements) and documented in the medical record.    History of Present Illness  1.  Physical exam.  Immunizations: Interested in flu vaccine  Not fasting for blood work today  Feeling well without any significant concerns today    2. Premature Ventricular Contractions (PVCs)    Jonah is currently taking metoprolol for PVCs and tolerates it well without any adverse effects. The medication is working effectively. He does not notice any PVCs, particularly since the dosage was increased.    3. Mediterranean Fever    Jonah was evaluated by genetics due to concerns about Mediterranean fever. Although the genetic test was negative, he responds exceptionally well to colchicine, experiencing no adverse effects or recurrent fevers. This suggests a high likelihood of Mediterranean fever. He is open to continuing colchicine if deemed necessary.    Review of Systems  All pertinent positive symptoms are included in the history of present illness.    All other systems have been reviewed and are negative and noncontributory to this patient's current ailments.    Past Medical History  He has a past medical history of Acute viral pericarditis ([facility]) (12/19/2023), Closed displaced fracture of distal pole of navicular bone of left wrist (01/16/2024), Myocardial ischemia (01/18/2024), and Pericardial effusion ([facility]) (10/31/2023).    Surgical History  He has a past surgical history that includes Cardiac catheterization (N/A, 11/2/2023).     Social History  He reports that he quit smoking about 5 years ago. His smoking use included cigarettes. He started smoking about 14 years ago. He has never used smokeless tobacco. He reports current  "alcohol use of about 2.0 standard drinks of alcohol per week. He reports that he does not use drugs.    Family History  He indicated that the status of his mother is unknown. He indicated that the status of his father is unknown. He indicated that the status of his paternal grandmother is unknown. He indicated that the status of his paternal grandfather is unknown.    Outpatient Medications Prior to Visit   Medication Sig Dispense Refill    colchicine 0.6 mg tablet Take 1 tablet (0.6 mg) by mouth once daily. 90 tablet 3    metoprolol succinate XL (Toprol-XL) 25 mg 24 hr tablet Take 1 tablet (25 mg) by mouth once daily. Do not crush or chew. 90 tablet 1    prednisoLONE acetate (Pred-Forte) 1 % ophthalmic suspension INSTILL 1 DROP IN BOTH EYES THREE TIMES DAILY FOR 5 DAYS THEN 1 DROP IN BOTH EYES ONCE DAILY FOR 5 DAYS       No facility-administered medications prior to visit.     Allergies  Patient has no known allergies.    Immunization History   Administered Date(s) Administered    COVID-19, mRNA, LNP-S, PF, 30 mcg/0.3 mL dose 10/06/2021, 10/28/2021    DTP 1990, 02/18/1992, 04/14/1992    DTaP, Unspecified 04/13/1993, 09/16/1996    Flu vaccine (IIV4), preservative free *Check age/dose* 01/17/2019    Flu vaccine, trivalent, preservative free, age 6 months and greater (Fluarix/Fluzone/Flulaval) 02/27/2025    Hepatitis B vaccine, 19 yrs and under (RECOMBIVAX, ENGERIX) 08/10/1999, 09/14/1999, 02/08/2000    HiB PRP-T conjugate vaccine (HIBERIX, ACTHIB) 1990, 02/18/1992, 04/14/1992, 01/12/1993    MMR vaccine, subcutaneous (MMR II) 01/12/1993, 08/10/1999    Meningococcal ACWY-D (Menactra) 4-valent conjugate vaccine 06/04/2010    OPV 1990, 02/18/1992, 04/13/1993, 09/16/1996    Td (adult), unspecified 06/22/2004    Tdap vaccine, age 7 year and older (BOOSTRIX, ADACEL) 06/04/2010, 01/17/2019     Objective   Visit Vitals  /80   Pulse 57   Ht 1.956 m (6' 5\")   Wt 107 kg (236 lb)   BMI 27.99 kg/m² "   Smoking Status Former   BSA 2.41 m²        BP Readings from Last 3 Encounters:   02/27/25 132/80   11/13/24 (!) 148/95   08/21/24 126/80      Wt Readings from Last 3 Encounters:   02/27/25 107 kg (236 lb)   11/13/24 111 kg (245 lb 9.5 oz)   08/21/24 104 kg (230 lb)     Relevant Results  Office Visit on 02/27/2025   Component Date Value    POC Color, Urine 02/27/2025 Yellow     POC Appearance, Urine 02/27/2025 Clear     POC Glucose, Urine 02/27/2025 NEGATIVE     POC Bilirubin, Urine 02/27/2025 NEGATIVE     POC Ketones, Urine 02/27/2025 NEGATIVE     POC Specific Gravity, Ur* 02/27/2025 >=1.030     POC Blood, Urine 02/27/2025 NEGATIVE     POC PH, Urine 02/27/2025 7.0     POC Protein, Urine 02/27/2025 NEGATIVE     POC Urobilinogen, Urine 02/27/2025 0.2     Poc Nitrite, Urine 02/27/2025 NEGATIVE     POC Leukocytes, Urine 02/27/2025 NEGATIVE      Physical Exam  CONSTITUTIONAL - well nourished, well developed, looks like stated age, in no acute distress, not ill-appearing, and not tired appearing  SKIN - normal skin color and pigmentation, normal skin turgor without rash, lesions, or nodules visualized  HEAD - no trauma, normocephalic  EYES - pupils are equal and reactive to light, extraocular muscles are intact, and normal external exam  ENT - TM's intact, no injection, no signs of infection, uvula midline, normal tongue movement and throat normal, no exudate  NECK - supple without rigidity, no neck mass was observed, no thyromegaly or thyroid nodules  CHEST - clear to auscultation, no wheezing, no crackles and no rales, good effort  CARDIAC - bradycardic rate and regular rhythm, no skipped beats, no murmur  ABDOMEN - no organomegaly, soft, nontender, nondistended, normal bowel sounds, no guarding/rebound/rigidity, negative McBurney sign and negative Suresh sign  EXTREMITIES - no obvious or evident edema, no obvious or evident deformities  NEUROLOGICAL - normal gait, normal balance, normal motor, no ataxia, DTRs equal  and symmetrical; alert, oriented and no focal signs  PSYCHIATRIC - alert, pleasant and cordial, age-appropriate  IMMUNOLOGIC - no cervical lymphadenopathy    Assessment and Plan  Problem List Items Addressed This Visit       PVC (premature ventricular contraction)     Stable, no changes to medication recommended         Relevant Medications    metoprolol succinate XL (Toprol-XL) 25 mg 24 hr tablet    Annual physical exam - Primary     Complete history and physical examination was performed  EKG reveals sinus bradycardia without acute changes  We will notify of test results once available         Relevant Orders    Lipid Panel    TSH with reflex to Free T4 if abnormal    Comprehensive Metabolic Panel    CBC and Auto Differential    POCT UA (nonautomated) manually resulted (Completed)    ECG 12 Lead (Completed)    Mediterranean fever     Since you continue to tolerate colchicine without any ill effects, and you have had no symptoms whatsoever from possible Mediterranean fever, I would like to have you continue colchicine indefinitely    If your kidney function starts to show decline, we may have to reconsider this thought          Other Visit Diagnoses       Need for influenza vaccination        All questions were answered and you were counseled on immunization(s) in detail and vaccination was provided    Relevant Orders    Flu vaccine, trivalent, preservative free, age 6 months and greater (Fluraix/Fluzone/Flulaval) (Completed)

## 2025-02-27 NOTE — ASSESSMENT & PLAN NOTE
Since you continue to tolerate colchicine without any ill effects, and you have had no symptoms whatsoever from possible Mediterranean fever, I would like to have you continue colchicine indefinitely    If your kidney function starts to show decline, we may have to reconsider this thought

## 2025-03-03 ENCOUNTER — PHARMACY VISIT (OUTPATIENT)
Dept: PHARMACY | Facility: CLINIC | Age: 35
End: 2025-03-03
Payer: COMMERCIAL

## 2025-03-21 PROCEDURE — RXMED WILLOW AMBULATORY MEDICATION CHARGE

## 2025-03-24 ENCOUNTER — PHARMACY VISIT (OUTPATIENT)
Dept: PHARMACY | Facility: CLINIC | Age: 35
End: 2025-03-24
Payer: COMMERCIAL

## 2025-05-09 ENCOUNTER — PATIENT MESSAGE (OUTPATIENT)
Dept: PRIMARY CARE | Facility: CLINIC | Age: 35
End: 2025-05-09
Payer: COMMERCIAL

## 2025-05-20 PROCEDURE — RXMED WILLOW AMBULATORY MEDICATION CHARGE

## 2025-05-27 ENCOUNTER — PHARMACY VISIT (OUTPATIENT)
Dept: PHARMACY | Facility: CLINIC | Age: 35
End: 2025-05-27
Payer: COMMERCIAL

## 2025-06-26 PROCEDURE — RXMED WILLOW AMBULATORY MEDICATION CHARGE

## 2025-07-01 ENCOUNTER — PHARMACY VISIT (OUTPATIENT)
Dept: PHARMACY | Facility: CLINIC | Age: 35
End: 2025-07-01
Payer: COMMERCIAL

## 2025-07-29 PROCEDURE — RXMED WILLOW AMBULATORY MEDICATION CHARGE

## 2025-07-31 ENCOUNTER — PHARMACY VISIT (OUTPATIENT)
Dept: PHARMACY | Facility: CLINIC | Age: 35
End: 2025-07-31
Payer: COMMERCIAL

## 2025-08-28 DIAGNOSIS — A23.9 MEDITERRANEAN FEVER: ICD-10-CM

## 2025-08-28 PROCEDURE — RXMED WILLOW AMBULATORY MEDICATION CHARGE

## 2025-08-28 RX ORDER — COLCHICINE 0.6 MG/1
0.6 TABLET ORAL DAILY
Qty: 90 TABLET | Refills: 3 | Status: CANCELLED | OUTPATIENT
Start: 2025-08-28 | End: 2026-08-28

## 2025-08-29 PROCEDURE — RXMED WILLOW AMBULATORY MEDICATION CHARGE

## 2025-08-29 RX ORDER — COLCHICINE 0.6 MG/1
0.6 TABLET ORAL DAILY
Qty: 30 TABLET | Refills: 0 | Status: SHIPPED | OUTPATIENT
Start: 2025-08-29 | End: 2025-09-28

## 2025-08-30 ENCOUNTER — PHARMACY VISIT (OUTPATIENT)
Dept: PHARMACY | Facility: CLINIC | Age: 35
End: 2025-08-30
Payer: COMMERCIAL

## 2025-09-02 ENCOUNTER — PHARMACY VISIT (OUTPATIENT)
Dept: PHARMACY | Facility: CLINIC | Age: 35
End: 2025-09-02
Payer: COMMERCIAL

## (undated) DEVICE — CATHETER KIT, PERICARDIOCENTESIS, 8.3FR X 40CM, PIGTAIL SHAPE

## (undated) DEVICE — ACCESS KIT, S-MAK MINI, 4FR 10CM 0.018IN 40CM, NT/PT, ECHO ENHANCE NEEDLE

## (undated) DEVICE — ACCESS SET, MICROPUNCT, TRANSITIONLESS, .5.0FR/15M 21GAX12CM NDL

## (undated) DEVICE — SHEATH, PINNACLE, 10 CM,  9FR INTRODUCER, 9FR DIA, 2.5 CM DIALATOR

## (undated) DEVICE — GUIDE WIRE, 035/190CM, HI-TORGUE SUPRA CORE